# Patient Record
Sex: FEMALE | Race: WHITE | NOT HISPANIC OR LATINO | Employment: FULL TIME | URBAN - METROPOLITAN AREA
[De-identification: names, ages, dates, MRNs, and addresses within clinical notes are randomized per-mention and may not be internally consistent; named-entity substitution may affect disease eponyms.]

---

## 2023-02-24 LAB
ALANINE AMINOTRANSFERASE (SGPT) (U/L) IN SER/PLAS: 5 U/L (ref 7–45)
ALBUMIN (G/DL) IN SER/PLAS: 4.8 G/DL (ref 3.4–5)
ALKALINE PHOSPHATASE (U/L) IN SER/PLAS: 79 U/L (ref 33–110)
ANION GAP IN SER/PLAS: 13 MMOL/L (ref 10–20)
ASPARTATE AMINOTRANSFERASE (SGOT) (U/L) IN SER/PLAS: 16 U/L (ref 9–39)
BILIRUBIN TOTAL (MG/DL) IN SER/PLAS: 0.9 MG/DL (ref 0–1.2)
CALCIUM (MG/DL) IN SER/PLAS: 9.7 MG/DL (ref 8.6–10.6)
CARBON DIOXIDE, TOTAL (MMOL/L) IN SER/PLAS: 30 MMOL/L (ref 21–32)
CHLORIDE (MMOL/L) IN SER/PLAS: 102 MMOL/L (ref 98–107)
CHOLESTEROL (MG/DL) IN SER/PLAS: 214 MG/DL (ref 0–199)
CHOLESTEROL IN HDL (MG/DL) IN SER/PLAS: 64.7 MG/DL
CHOLESTEROL/HDL RATIO: 3.3
CREATININE (MG/DL) IN SER/PLAS: 0.72 MG/DL (ref 0.5–1.05)
ERYTHROCYTE DISTRIBUTION WIDTH (RATIO) BY AUTOMATED COUNT: 12.6 % (ref 11.5–14.5)
ERYTHROCYTE MEAN CORPUSCULAR HEMOGLOBIN CONCENTRATION (G/DL) BY AUTOMATED: 32.9 G/DL (ref 32–36)
ERYTHROCYTE MEAN CORPUSCULAR VOLUME (FL) BY AUTOMATED COUNT: 96 FL (ref 80–100)
ERYTHROCYTES (10*6/UL) IN BLOOD BY AUTOMATED COUNT: 4.55 X10E12/L (ref 4–5.2)
ESTIMATED AVERAGE GLUCOSE FOR HBA1C: 100 MG/DL
GFR FEMALE: >90 ML/MIN/1.73M2
GLUCOSE (MG/DL) IN SER/PLAS: 89 MG/DL (ref 74–99)
HEMATOCRIT (%) IN BLOOD BY AUTOMATED COUNT: 43.8 % (ref 36–46)
HEMOGLOBIN (G/DL) IN BLOOD: 14.4 G/DL (ref 12–16)
HEMOGLOBIN A1C/HEMOGLOBIN TOTAL IN BLOOD: 5.1 %
LDL: 133 MG/DL (ref 0–99)
LEUKOCYTES (10*3/UL) IN BLOOD BY AUTOMATED COUNT: 5.7 X10E9/L (ref 4.4–11.3)
NRBC (PER 100 WBCS) BY AUTOMATED COUNT: 0 /100 WBC (ref 0–0)
PLATELETS (10*3/UL) IN BLOOD AUTOMATED COUNT: 229 X10E9/L (ref 150–450)
POTASSIUM (MMOL/L) IN SER/PLAS: 3.5 MMOL/L (ref 3.5–5.3)
PROTEIN TOTAL: 6.8 G/DL (ref 6.4–8.2)
SODIUM (MMOL/L) IN SER/PLAS: 141 MMOL/L (ref 136–145)
THYROTROPIN (MIU/L) IN SER/PLAS BY DETECTION LIMIT <= 0.05 MIU/L: 1.87 MIU/L (ref 0.44–3.98)
TRIGLYCERIDE (MG/DL) IN SER/PLAS: 80 MG/DL (ref 0–149)
UREA NITROGEN (MG/DL) IN SER/PLAS: 14 MG/DL (ref 6–23)
VLDL: 16 MG/DL (ref 0–40)

## 2023-03-15 ENCOUNTER — TELEPHONE (OUTPATIENT)
Dept: PRIMARY CARE | Facility: CLINIC | Age: 58
End: 2023-03-15
Payer: COMMERCIAL

## 2023-03-15 DIAGNOSIS — I10 HYPERTENSION, UNSPECIFIED TYPE: Primary | ICD-10-CM

## 2023-03-15 PROBLEM — G89.29 CHRONIC HIP PAIN: Status: ACTIVE | Noted: 2023-03-15

## 2023-03-15 PROBLEM — J30.9 ALLERGIC RHINITIS: Status: ACTIVE | Noted: 2023-03-15

## 2023-03-15 PROBLEM — L71.9 ROSACEA: Status: ACTIVE | Noted: 2023-03-15

## 2023-03-15 PROBLEM — M15.9 OSTEOARTHRITIS OF MULTIPLE JOINTS: Status: ACTIVE | Noted: 2023-03-15

## 2023-03-15 PROBLEM — R92.8 ABNORMAL SCREENING MAMMOGRAM: Status: ACTIVE | Noted: 2023-03-15

## 2023-03-15 PROBLEM — N95.2 POSTMENOPAUSAL ATROPHIC VAGINITIS: Status: ACTIVE | Noted: 2023-03-15

## 2023-03-15 PROBLEM — D12.6 ADENOMATOUS COLON POLYP: Status: ACTIVE | Noted: 2023-03-15

## 2023-03-15 PROBLEM — R41.3 MEMORY IMPAIRMENT: Status: ACTIVE | Noted: 2023-03-15

## 2023-03-15 PROBLEM — N63.20 LEFT BREAST MASS: Status: ACTIVE | Noted: 2023-03-15

## 2023-03-15 PROBLEM — G25.81 RLS (RESTLESS LEGS SYNDROME): Status: ACTIVE | Noted: 2023-03-15

## 2023-03-15 PROBLEM — M72.2 PLANTAR FASCIAL FIBROMATOSIS: Status: ACTIVE | Noted: 2023-03-15

## 2023-03-15 PROBLEM — E78.5 HYPERLIPIDEMIA: Status: ACTIVE | Noted: 2023-03-15

## 2023-03-15 PROBLEM — M25.559 CHRONIC HIP PAIN: Status: ACTIVE | Noted: 2023-03-15

## 2023-03-15 PROBLEM — M65.30 TRIGGER FINGER: Status: ACTIVE | Noted: 2023-03-15

## 2023-03-15 PROBLEM — F41.9 ANXIETY DISORDER: Status: ACTIVE | Noted: 2023-03-15

## 2023-03-15 PROBLEM — M51.360 LUMBAR DISCOGENIC PAIN SYNDROME: Status: ACTIVE | Noted: 2023-03-15

## 2023-03-15 PROBLEM — R73.01 ABNORMAL FASTING GLUCOSE: Status: ACTIVE | Noted: 2023-03-15

## 2023-03-15 PROBLEM — G20.A1 PARKINSON DISEASE, SYMPTOMATIC (MULTI): Status: ACTIVE | Noted: 2023-03-15

## 2023-03-15 PROBLEM — G47.33 OSA ON CPAP: Status: ACTIVE | Noted: 2023-03-15

## 2023-03-15 PROBLEM — L30.8 PSORIASIFORM DERMATITIS: Status: ACTIVE | Noted: 2023-03-15

## 2023-03-15 PROBLEM — N95.1 HOT FLASH, MENOPAUSAL: Status: ACTIVE | Noted: 2023-03-15

## 2023-03-15 PROBLEM — R79.89 D-DIMER, ELEVATED: Status: ACTIVE | Noted: 2023-03-15

## 2023-03-15 PROBLEM — K21.9 GERD WITHOUT ESOPHAGITIS: Status: ACTIVE | Noted: 2023-03-15

## 2023-03-15 PROBLEM — M79.7 FIBROMYALGIA: Status: ACTIVE | Noted: 2023-03-15

## 2023-03-15 PROBLEM — N95.1 VAGINAL DRYNESS, MENOPAUSAL: Status: ACTIVE | Noted: 2023-03-15

## 2023-03-15 PROBLEM — H81.10 BPV (BENIGN POSITIONAL VERTIGO), UNSPECIFIED LATERALITY: Status: ACTIVE | Noted: 2023-03-15

## 2023-03-15 PROBLEM — E83.10 IRON METABOLISM DISORDER: Status: ACTIVE | Noted: 2023-03-15

## 2023-03-15 PROBLEM — M51.26 LUMBAR DISCOGENIC PAIN SYNDROME: Status: ACTIVE | Noted: 2023-03-15

## 2023-03-15 PROBLEM — N90.4 LICHEN SCLEROSUS ET ATROPHICUS OF THE VULVA: Status: ACTIVE | Noted: 2023-03-15

## 2023-03-15 PROBLEM — R41.89 COGNITIVE IMPAIRMENT: Status: ACTIVE | Noted: 2023-03-15

## 2023-03-15 RX ORDER — CARBIDOPA AND LEVODOPA 25; 100 MG/1; MG/1
1 TABLET ORAL 3 TIMES DAILY
COMMUNITY
Start: 2021-01-18 | End: 2024-03-21 | Stop reason: SDUPTHER

## 2023-03-15 RX ORDER — TRIAMCINOLONE ACETONIDE 5 MG/G
1 CREAM TOPICAL 2 TIMES DAILY PRN
COMMUNITY
Start: 2017-11-15

## 2023-03-15 RX ORDER — OMEPRAZOLE 20 MG/1
1 TABLET, DELAYED RELEASE ORAL DAILY
COMMUNITY
End: 2023-10-25 | Stop reason: ALTCHOICE

## 2023-03-15 RX ORDER — FLUTICASONE PROPIONATE 50 MCG
1 SPRAY, SUSPENSION (ML) NASAL DAILY
COMMUNITY
Start: 2021-03-01

## 2023-03-15 RX ORDER — QUINAPRIL 5 1/1
1 TABLET ORAL DAILY
COMMUNITY
End: 2023-10-13 | Stop reason: ALTCHOICE

## 2023-03-15 RX ORDER — HYDROCHLOROTHIAZIDE 25 MG/1
1 TABLET ORAL DAILY
COMMUNITY
Start: 2015-11-28 | End: 2023-12-03

## 2023-03-15 RX ORDER — CELECOXIB 200 MG/1
1 CAPSULE ORAL DAILY
COMMUNITY
Start: 2020-10-19 | End: 2023-10-25 | Stop reason: ALTCHOICE

## 2023-03-15 RX ORDER — GABAPENTIN 100 MG/1
CAPSULE ORAL
COMMUNITY
Start: 2022-01-05 | End: 2023-10-13 | Stop reason: HOSPADM

## 2023-03-15 RX ORDER — ESTRADIOL 0.1 MG/G
1 CREAM VAGINAL DAILY
COMMUNITY
Start: 2020-07-21 | End: 2024-03-22 | Stop reason: ALTCHOICE

## 2023-03-15 RX ORDER — MINERAL OIL
180 ENEMA (ML) RECTAL DAILY
COMMUNITY
Start: 2013-07-18

## 2023-03-15 RX ORDER — EZETIMIBE 10 MG/1
1 TABLET ORAL DAILY
COMMUNITY
Start: 2020-10-09 | End: 2024-02-21 | Stop reason: SDUPTHER

## 2023-03-15 RX ORDER — LISINOPRIL 10 MG/1
10 TABLET ORAL DAILY
Qty: 90 TABLET | Refills: 1 | Status: SHIPPED | OUTPATIENT
Start: 2023-03-15 | End: 2023-09-06

## 2023-03-15 RX ORDER — QUINAPRIL 10 MG/1
1 TABLET ORAL DAILY
COMMUNITY
Start: 2014-12-26 | End: 2023-10-13 | Stop reason: ALTCHOICE

## 2023-03-15 RX ORDER — TRIAMCINOLONE ACETONIDE 5 MG/G
OINTMENT TOPICAL
COMMUNITY
End: 2023-10-13 | Stop reason: ALTCHOICE

## 2023-03-15 RX ORDER — MECLIZINE HCL 25MG 25 MG/1
TABLET, CHEWABLE ORAL
COMMUNITY
Start: 2022-02-24 | End: 2023-10-25 | Stop reason: ALTCHOICE

## 2023-03-15 RX ORDER — METRONIDAZOLE 7.5 MG/G
1 LOTION TOPICAL 2 TIMES DAILY PRN
COMMUNITY

## 2023-03-15 RX ORDER — TIZANIDINE 2 MG/1
TABLET ORAL
COMMUNITY
End: 2023-10-26 | Stop reason: WASHOUT

## 2023-03-15 RX ORDER — GABAPENTIN 300 MG/1
CAPSULE ORAL
COMMUNITY
Start: 2021-04-30 | End: 2023-11-09

## 2023-03-15 RX ORDER — BETAMETHASONE VALERATE 1 MG/G
1 CREAM TOPICAL 2 TIMES DAILY PRN
COMMUNITY
Start: 2022-02-16

## 2023-08-08 ENCOUNTER — HOSPITAL ENCOUNTER (OUTPATIENT)
Dept: DATA CONVERSION | Facility: HOSPITAL | Age: 58
Discharge: HOME | End: 2023-08-08

## 2023-08-08 DIAGNOSIS — M47.817 SPONDYLOSIS WITHOUT MYELOPATHY OR RADICULOPATHY, LUMBOSACRAL REGION: ICD-10-CM

## 2023-09-06 DIAGNOSIS — I10 HYPERTENSION, UNSPECIFIED TYPE: ICD-10-CM

## 2023-09-06 RX ORDER — LISINOPRIL 10 MG/1
10 TABLET ORAL DAILY
Qty: 90 TABLET | Refills: 1 | Status: SHIPPED | OUTPATIENT
Start: 2023-09-06 | End: 2024-02-21 | Stop reason: SDUPTHER

## 2023-09-27 PROBLEM — M47.817 SPONDYLOSIS WITHOUT MYELOPATHY OR RADICULOPATHY, LUMBOSACRAL REGION: Status: ACTIVE | Noted: 2023-09-27

## 2023-09-27 RX ORDER — BETAMETHASONE DIPROPIONATE 0.5 MG/G
1 CREAM TOPICAL DAILY PRN
COMMUNITY
End: 2024-03-21 | Stop reason: WASHOUT

## 2023-09-27 RX ORDER — OXYBUTYNIN CHLORIDE 10 MG/1
1 TABLET, EXTENDED RELEASE ORAL DAILY
COMMUNITY
Start: 2023-04-26 | End: 2023-11-15 | Stop reason: SDUPTHER

## 2023-09-27 RX ORDER — DICLOFENAC SODIUM 75 MG/1
1 TABLET, DELAYED RELEASE ORAL 2 TIMES DAILY
COMMUNITY
Start: 2023-09-14 | End: 2023-10-26

## 2023-09-27 RX ORDER — DICLOFENAC 35 MG/1
1 CAPSULE ORAL 3 TIMES DAILY PRN
COMMUNITY
End: 2023-10-25 | Stop reason: ALTCHOICE

## 2023-10-04 NOTE — H&P (VIEW-ONLY)
Provider Impressions  1. Parkinson's disease  - Doing great. Continue CD-LD  TID. Refills sent.   - Educated on falls risk, PT, exercise. Encouraged to continue staying active.   - May bring in for injections for stiffness or she can discuss this with pain management   [facility]     Please follow up in 6 months or sooner as needed.      Patient Discussion/Summary  It was great to meet you today.     1. Parkinson's disease  - Continue levodopa  one tablet three times per day. Refills sent.   High calorie or high fat foods may slow the absorption of this medication, therefore it is best to take either 30 min prior to a meal or >2 hours after eating if able.   Doses should be timed 6-8 hours apart during waking hours.   Carbidopa-Levodopa side effects reviewed and include:  - may cause nausea and vomiting initially and with dose increases, can take with food if this helps.   - can cause orthostatic hypotension, please be cautious with position changes and lightheadedness  - may cause impulsive behaviors in susceptible patients, please let us know if this becomes a concern.   If having any issues, please call the office and let my staff know right away. Do not abruptly stop taking this medication.   - Continue staying active, let me know if you would like more PT.   - We can schedule injections with Dr. Metz or pain management for the stiffness in your neck and shoulders.       -Support groups may be helpful to patients and their families with Parkinson's disease. Organizations such as the Parkinson's Foundation, American Parkinson Disease Association, and National Kyle of Neurological Disorders and Stroke may also be helpful as they can provide reliable information and resources. The Parkinson's Disease Handbook is also available through the American Parkinson Disease Association, which can provide helpful information to patients and families. In the [facility], particular  "events and programs geared toward Parkinson's Disease include [employer], [employer], and the annual Parkinson's Bootcamp.   -Studies have suggested that exercise can slow the progression of disease, and it also can help patients feel better physically, mentally, and allow for social interaction. It also can help with the stiffness and bent posture that some patients with Parkinson's experience. Exercise routines should include things like dance and/or analy chi which can help with balance and flexibility. Other aerobic exercises like walking, biking, or swimming can also be helpful. It may be helpful to work with a physical therapist.   -Safety for patients with Parkinson's disease is very important. We want to prevent falls as much as possible, and this risk increases as the disease progresses. Consider changes in the home to make the bathtub or shower safer. Avoid have loose rugs around the house and try to make sure the house is well lit especially at night. Again, physical therapy may be helpful for falls prevention. Speech therapy may also be helpful to help with slurred or quiet speech, and to help avoid choking and problems swallowing. [facility] can help located Parkinson-trained speech therapists near you.   -Finally, Mediterranean style diet may help decrease the risk of dementia and Alzheimer 's disease. Some patients struggle with constipation from the Parkinson's disease or the medications. Make sure you speak with your provider if you are experiencing this, but some steps including increasing hydration or using a stool softener may be helpful.     The above information is paraphrased from \"UpToDate Patient Education: Parkinson disease treatment options- education, support, and therapy (Beyond The Basics)\"     Follow up in 6 months and I can see you in [place].      Diagnoses/Problems  Assessed    · Parkinson disease, symptomatic (332.0) (G20)    Orders  PMH: History of " Parkinson's disease    · Carbidopa-Levodopa  MG Oral Tablet; 1 pill at 7 AM, 1 pill at 1 PM, 1 pill at  6 PM by mouth    Chief Complaint    Follow-up Visit for Parkinson's DIsease      Reference Documentation    See scanned note Office Note: last dr bruno note .      History of Present Illness  With a past medical history of allergic rhinitis, anxiety, HTN, BPPV, chronic hip and back pain, fibromyalgia, GERD, HLD, CHAY on CPAP, PD, RLS who presents for follow up of PD.     Last seen by Dr. Bruno in March 2023.   At that time, PD was in good control. She was having no memory issues. She was stable on CD-LD  mg TID.     Since then, she has been doing OK. Notices times that her symptoms worsen. She was diagnosed in 2020. Her sister also has PD.     MOTOR SYMPTOMS:  Balance difficulty- yes, gets tripped easily, also when she turns and spins too fast. Notices her feet drag/shuffle.   Falls- 1 since last visit, got tripped up in cords.   Exercise/PT- Recently did PT for her back. Very active job.   Help with ADLs- Her  is doing household tasks now that he is retired but she feels she could still do alll of it. She is still working. Managing her own meds.     NON MOTOR SYMPTOMS  Orthostatic lightheadedness- yes , not every day, usually when she stands up too fast.   Cognitive- yes, attention issues, some forgetfulness. Leaves herself notes, sets alarms.   Depression/apathy- no   Anxiety-no   Insomnia/RBD- no   Fatigue- has been really tired lately but believes it is from stroke.   Sialorrhea- some during sleep   Hypophonia- no   Dysphagia- no   Constipation- no  Urinary dysfunction- had some urgency, now on oxybutynin.      Meds: cd-ld  TID  SE: Denies hallucinations, dyskinesia, impulse control, sudden sleep, edema  Time to turn on: about a half hour   Wearing off: does not   Previously tried: none     Medical, surgical, family, and social histories, medications, and allergies were reviewed in  the chart and with the patient during the visit.                  Review of Systems    Constitutional: no fever, no unexplained weight gain, no unexplained weight loss, not feeling tired, no chills and appetite is not poor.   Eyes: no blurred vision, no diplopia, no eyesight problems, no eye pain, no purulent discharge from the eyes, eyes not red, no dryness of the eyes and no itching of the eyes.   ENT: tinnitus and vertigo, but no hearing loss, no earache, no sore throat, no hoarseness, no swollen glands in the neck and no nosebleeds.   Cardiovascular: no chest pain, no shortness of breath, no palpitations, no lower extremity edema, no dyspnea at rest, no intermittent leg claudication, the heart rate was not slow and the heart rate was not fast.   Respiratory: no chronic cough, not coughing up sputum, no wheezing that is consistent with asthma, no shortness of breath during exertion, no asthma, no orthopnea and no Postural Nocturnal Dyspnea.   Gastrointestinal: no abdominal pain, no nausea, no vomiting, no diarrhea, no constipation, no bloody stools and no change in stool.   Genitourinary: no urinary frequency, no dysuria, no burning sensation during urination, no hematuria and no incontinence.   Musculoskeletal: joint stiffness and Neck Pain, but no arthralgias, no muscle weakness, no back pain, no difficulty walking, no myalgias, no joint swelling, no limb pain and no limb swelling.   Skin: no skin rashes, no change in skin color and pigmentation, no skin lesions, no skin lumps, no itching and no skin wound.   Neurological: dizziness, tingling and numbness, but no headaches, no seizures, no limb weakness and no fainting.   Psychiatric: no confusion, no memory lapses or loss, no depression, no sleep disturbances, no anxiety, not suicidal, no personality change and no emotional problems.   Endocrine: no excessive thirst, no cold intolerance, no heat intolerance, no dry skin and no increased urinary frequency.    Hematologic/Lymphatic: is not slow to heal, does not bleed easily, does not bruise easily, no thrombophlebitis and no anemia.   Allergic/Immunology: no frequent infections and no report of allergic reaction.   All other systems have been reviewed and are negative for complaint.      Active Problems  Problems    · Abnormal fasting glucose (790.29) (R73.01)   · Abnormal screening mammogram (793.80) (R92.8)   · Adenomatous colon polyp (211.3) (D12.6)   · Allergic rhinitis (477.9) (J30.9)   · Anxiety disorder (300.00) (F41.9)   · Added by Problem List Migration; 2013-4-2   · Back pain (724.5) (M54.9)   · Benign essential hypertension (401.1) (I10)   · Added by Problem List Migration; 2013-4-2   · BMI 25.0-25.9,adult (V85.21) (Z68.25)   · BPV (benign positional vertigo), unspecified laterality (386.11) (H81.10)   · Chronic hip pain (719.45,338.29) (M25.559,G89.29)   · D-dimer, elevated (790.92) (R79.89)   · Encounter for cervical Pap smear with pelvic exam (V76.2,V72.31) (Z01.419)   · Encounter for routine gynecological examination (V72.31) (Z01.419)   · Encounter for screening for malignant neoplasm of cervix (V76.2) (Z12.4)   · Fibromyalgia (729.1) (M79.7)   · GERD without esophagitis (530.81) (K21.9)   · Hyperlipidemia (272.4) (E78.5)   · Iron metabolism disorder (275.09) (E83.10)   · Left breast mass (611.72) (N63.20)   · Lichen sclerosus et atrophicus of the 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)   · Lumbar discogenic pain syndrome (722.10) (M51.26)   · Memory impairment (780.93) (R41.3)   · CHAY on CPAP (327.23) (G47.33)   · Osteoarthritis of multiple joints (715.89) (M15.9)   · Parkinson disease, symptomatic (332.0) (G20)   · Postmenopausal atrophic vaginitis (627.3) (N95.2)   · Psoriasiform 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)   · RLS (restless legs syndrome) (333.94) (G25.81)   · Rosacea (695.3) (L71.9)   · Screening for malignant neoplasm of cervix (V76.2) (Z12.4)   · Added by Problem List Migration; 2013-6-28; Moved to Suppressed Nov  25 2013 9:19PM   · Trigger finger, right (727.03) (M65.30)   · Trochanteric bursitis of left hip (726.5) (M70.62)   · Vaginal dryness, menopausal (627.2) (N95.1)   · Visit for screening mammogram (V76.12) (Z12.31)    Past Medical History  Problems    · History of Abnormal mammogram of left breast (793.80) (R92.8)   · Resolved Date: 15 Ran 2020   · History of Acute cystitis with hematuria (595.0) (N30.01)   · Resolved Date: 15 Ran 2020   · History of Acute low back pain (724.2) (M54.50)   · Resolved Date: 15 Ran 2020   · History of BMI 29.0-29.9,adult (V85.25) (Z68.29)   · Resolved Date: 24 Feb 2023   · History of BMI 31.0-31.9,adult (V85.31) (Z68.31)   · Resolved Date: 24 Feb 2021   · History of BMI 33.0-33.9,adult (V85.33) (Z68.33)   · Resolved Date: 24 Aug 2021   · History of BMI 35.0-35.9,adult (V85.35) (Z68.35)   · Resolved Date: 15 Ran 2020   · History of BMI 35.0-35.9,adult (V85.35) (Z68.35)   · Resolved Date: 24 Feb 2022   · History of BMI 36.0-36.9,adult (V85.36) (Z68.36)   · Resolved Date: 05 Dec 2022   · History of Breast asymmetry (611.89) (N64.89)   · Resolved Date: 24 Aug 2021   · History of Carpal tunnel syndrome of right wrist (354.0) (G56.01)   · Resolved Date: 15 Ran 2020   · History of Colon cancer screening (V76.51) (Z12.11)   · Resolved Date: 15 Ran 2020   · History of Elbow pain (719.42) (M25.529)   · Resolved Date: 24 Aug 2021   · History of Encounter for immunization (V03.89) (Z23)   · Resolved Date: 24 Feb 2023   · History of Encounter for screening for malignant neoplasm of cervix (V76.2) (Z12.4)   · Resolved Date: 24 Aug 2021   · History of Epicondylitis elbow, medial (726.31) (M77.00)   · Resolved Date: 24 Feb 2021   · History of Facial abscess (682.0) (L02.01)   · Resolved Date: 17 Mar 2018   · History of Hematuria, microscopic (599.72) (R31.29)   · Resolved Date: 21 Oct 2019   · History of breast lump (V13.89) (Z87.898)   · Resolved Date: 24 Aug 2021   · History of carpal tunnel surgery  (V15.89) (Z98.890)   · Resolved Date: 15 Ran 2020   · History of chest pain (V13.89) (Z87.898)   · Resolved Date: 09 Oct 2020   · History of dermatitis (V13.3) (Z87.2)   · Resolved Date: 21 Oct 2019   · History of dyspareunia (V13.29)   · Resolved Date: 21 Oct 2019   · 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)   · Resolved Date: 24 Feb 2021   · History of impaired cognition (V12.49) (Z86.69)   · Resolved Date: 24 Feb 2023   · History of joint pain (V13.59) (Z87.39)   · Resolved Date: 15 Ran 2020   · History of Parkinson's disease (V12.49) (Z86.69)   · Resolved Date: 24 Aug 2021   · History of postmenopausal bleeding (V13.29) (Z87.42)   · Resolved Date: 20 Oct 2019   · History of screening mammography (V15.89) (Z92.89)   · Resolved Date: 24 Feb 2023   · History of spinal stenosis (V13.59) (Z87.39)   · Resolved Date: 21 Oct 2019   · History of urinary frequency (V13.09) (Z87.898)   · Resolved Date: 15 Ran 2020   · History of Hot flash, menopausal (627.2) (N95.1)   · Resolved Date: 24 Feb 2023   · History of Hyperplastic colon polyp (211.3) (K63.5)   · 5/10/16   · History of Influenza vaccine administered (V04.81) (Z23)   · Resolved Date: 20 Oct 2019   · History of Leukocytes in urine (791.7) (R82.998)   · Resolved Date: 20 Oct 2019   · History of Mass of finger of right hand (782.2) (R22.31)   · Resolved Date: 15 Ran 2020   · History of Memory changes (780.93) (R41.3)   · Resolved Date: 24 Feb 2023   · History of Memory disorder (780.93) (R41.3)   · Resolved Date: 24 Aug 2021   · History of Nephrolithiasis (V13.01)   · Resolved Date: 18 Jul 2013   · History of Numbness of fingers (782.0) (R20.0)   · Resolved Date: 15 Ran 2020   · History of Other acute sinusitis, recurrence not specified (461.8) (J01.80)   · Resolved Date: 20 Oct 2019   · History of Plantar fascial fibromatosis (728.71) (M72.2)   · Resolved Date: 24 Feb 2023   · History of Screening for rectal cancer (V76.41) (Z12.12)   · Resolved  Date: 15 Ran 2020   · History of Trigger ring finger of right hand (727.03) (M65.341)   · Resolved Date: 15 Ran 2020    Surgical History  Problems    · History of Complete Colonoscopy   · 5/10/16 hyperplastic   · History of Endoscopy   · History of Lysis Of Peritoneal Adhesions Laparoscopic   · History of Tubal Ligation    Family History  Mother    · Family history of Coronary arteriosclerosis   · Family history of Diabetes Mellitus (V18.0)   · Family history of Essential Hypertension   · Family history of malignant neoplasm of uterus (V16.49) (Z80.49)   · Family history of valvular heart disease (V17.49) (Z82.49)   · Denied: Family history of Obstructive sleep apnea, adult   · Family history of Presence of stent in coronary artery in patient with coronary artery  disease  Father    · Family history of Father  At Age 65   · Family history of Lung Cancer (V16.1)   · Denied: Family history of Obstructive sleep apnea, adult  Sister    · Family history of Essential Hypertension   · Family history of Parkinson's disease (V17.2) (Z82.0)  Brother    · Family history of Coronary Artery Disease (V17.49)   · Family history of Essential Hypertension  Other    · Family history of arthritis (V17.7) (Z82.61)   · Denied: Family history of connective tissue disease    Social History  Problems    · Alcohol Use (History)   ·    · Never a smoker   · Sexually active    Allergies  Medication    · Amoxicillin Trihydrate POWD   Adverse Reaction; Rash; Recorded By: Manolo Llanes; 2019 8:27:44 AM   · Rosuvastatin Calcium TABS   Adverse Reaction; Myalgia; Updated By: Manolo Llanes; 3/16/2019 8:37:54 AM  muscle aches    Current Meds    Medication Name Instruction   Betamethasone Valerate 0.1 % External Cream APPLY SPARINGLY TO AFFECTED AREA(S) TWICE DAILY   Carbidopa-Levodopa  MG Oral Tablet 1 pill at 7 AM, 1 pill at 1 PM, 1 pill at 6 PM by mouth   Diclofenac Sodium 75 MG Oral Tablet Delayed Release Take 1 tablet  twice daily   Estradiol 0.1 MG/GM Vaginal Cream INSERT 1/4 APPLICATORFUL  (1GM) VAGINALLY TWICE  WEEKLY.   Ezetimibe 10 MG Oral Tablet TAKE 1 TABLET BY MOUTH EVERY DAY   Fexofenadine HCl - 180 MG Oral Tablet TAKE 1 TABLET DAILY AS NEEDED FOR ALLERGIES by mouth   Fluticasone Propionate 50 MCG/ACT Nasal Suspension USE 1 SPRAY IN EACH NOSTRIL ONCE DAILY.   Gabapentin 300 MG Oral Capsule Take 2 capsule in the evening by mouth   hydroCHLOROthiazide 25 MG Oral Tablet Take 1 tablet daily   Lisinopril 10 MG Oral Tablet TAKE 1 TABLET DAILY.   oxyBUTYnin Chloride ER 10 MG Oral Tablet Extended Release 24 Hour Take 1 tablet daily   Triamcinolone Acetonide 0.5 % External Cream Apply sparingly to external vulva twice weekly for itching     Vitals  Vital Signs    Recorded: 15Sep2023 01:31PM   Temperature 96.7 F   Heart Rate 67   Systolic 118, LUE, Sitting   Diastolic 76, LUE, Sitting   Blood Pressure Cuff Size Adult   Height 5 ft 3.75 in   Weight 139 lb 4 oz   BMI Calculated 24.09 kg/m2   BSA Calculated 1.67   Tobacco Use b) No   Falls Screening (Age 18+) b) One or more falls in the last year   O2 Saturation 98     Physical Exam  Alert and oriented to person, place, time, and situation.   Facial muscles are symmetric without masked facies.  Ocular versions intact.   Speech fluent to history and without hypophonia.   Muscle tone and bulk normal and without cogwheel rigidity.   Bradykindetic in bilateral upper and lower extremities, worse on the right side.  No tremors or dyskinesia.  Normal posture. shuffling gait without decreased arm swing. No on-block turning or retropulsion.      Time    Prep time on date of the patient encounter: 5 minutes.   Time spent directly with patient/family/caregiver: 25 minutes.   Documentation time: 5 minutes.   Total time on date of patient encounter: 35 minutes.      Signatures   Electronically signed by : Unique Mcdonald PA-C; Sep 15 2023  2:13PM EST (Author)

## 2023-10-13 ENCOUNTER — HOSPITAL ENCOUNTER (OUTPATIENT)
Dept: RADIOLOGY | Facility: HOSPITAL | Age: 58
Discharge: HOME | End: 2023-10-13
Payer: COMMERCIAL

## 2023-10-13 ENCOUNTER — HOSPITAL ENCOUNTER (OUTPATIENT)
Dept: GASTROENTEROLOGY | Facility: HOSPITAL | Age: 58
Discharge: HOME | End: 2023-10-13
Payer: COMMERCIAL

## 2023-10-13 VITALS
HEART RATE: 52 BPM | SYSTOLIC BLOOD PRESSURE: 109 MMHG | RESPIRATION RATE: 16 BRPM | DIASTOLIC BLOOD PRESSURE: 76 MMHG | WEIGHT: 145 LBS | TEMPERATURE: 97.7 F | OXYGEN SATURATION: 100 % | BODY MASS INDEX: 24.75 KG/M2 | HEIGHT: 64 IN

## 2023-10-13 DIAGNOSIS — M51.36 DDD (DEGENERATIVE DISC DISEASE), LUMBAR: Primary | ICD-10-CM

## 2023-10-13 PROCEDURE — 62323 NJX INTERLAMINAR LMBR/SAC: CPT | Performed by: ANESTHESIOLOGY

## 2023-10-13 PROCEDURE — 77003 FLUOROGUIDE FOR SPINE INJECT: CPT

## 2023-10-13 ASSESSMENT — COLUMBIA-SUICIDE SEVERITY RATING SCALE - C-SSRS
1. IN THE PAST MONTH, HAVE YOU WISHED YOU WERE DEAD OR WISHED YOU COULD GO TO SLEEP AND NOT WAKE UP?: NO
6. HAVE YOU EVER DONE ANYTHING, STARTED TO DO ANYTHING, OR PREPARED TO DO ANYTHING TO END YOUR LIFE?: NO
2. HAVE YOU ACTUALLY HAD ANY THOUGHTS OF KILLING YOURSELF?: NO

## 2023-10-13 ASSESSMENT — PAIN DESCRIPTION - DESCRIPTORS: DESCRIPTORS: ACHING

## 2023-10-13 ASSESSMENT — PAIN SCALES - GENERAL: PAINLEVEL_OUTOF10: 3

## 2023-10-13 ASSESSMENT — PAIN - FUNCTIONAL ASSESSMENT: PAIN_FUNCTIONAL_ASSESSMENT: 0-10

## 2023-10-13 NOTE — DISCHARGE INSTRUCTIONS
DISCHARGEINSTRUCTIONS FOR INJECTIONS     You underwent L3/4 Epidrual Steroid Injection today    Aftermost injections, it is recommended that you relax and limit your activity for the remainder of the day unless you have been told otherwise by your pain physician.  You should not drive a car, operate machinery, or make important legal decisions unless otherwise directed by your pain physician.  You may resume your normal activity, including exercise, tomorrow.      Keep a written pain diary of how much pain relief you experienced following the injection procedure and the length of time of pain relief you experienced pain relief. Following diagnostic injections like medial branch nerve blocks, sacroiliac joint blocks, stellate ganglion injections and other blocks, it is very important you record the specific amount of pain relief you experienced immediately after the injectionand how long it lasted. Your doctor will ask you for this information at your follow up visit.     For all injections, please keep the injection site dry and inspect the site for a couple of days. You may remove the Band-Aid the day of the injection at any time.     Some discomfort, bruising or slight swelling may occur at the injection site. This is not abnormal if it occurs.  If needed you may:    -Take over the counter medication such as Tylenol or Motrin.   -Apply an ice pack for 30 minutes, 2 to 3 times a day for the first 24 hours.     You may shower today; no soaking baths, hot tubs, whirlpools or swimming pools for two days.      If you are given steroids in your injection, it may take 3-5 days for the steroid medication to take effect. You may notice a worsening of your symptoms for 1-2 days after the injection. This is not abnormal.  You may use acetaminophen, ibuprofen, or prescription medication that your doctor may have prescribed for you if you need to do so.     A few common side effects of steroids include facial flushing,  sweating, restlessness, irritability,difficulty sleeping, increase in blood sugar, and increased blood pressure. If you have diabetes, please monitor your blood sugar at least once a day for at least 5 days. If you have poorly controlled high blood pressure, monitoryour blood pressure for at least 2 days and contact your primary care physician if these numbers are unusually high for you.      If you take aspirin or non-steroidal anti-inflammatory drugs (examples are Motrin, Advil, ibuprofen, Naprosyn, Voltaren, Relafen, etc.) you may restart these this evening, but stop taking it 3 days before your next appointment, unless instructed otherwiseby your physician.      You do not need to discontinue non-aspirin-containing pain medications prior to an injection (examples: Celebrex, tramadol, hydrocodone and acetaminophen).      If you take a blood thinning medication (Coumadin, Lovenox, Fragmin,Ticlid, Plavix, Pradaxa, etc.), please discuss this with your primary care physician/cardiologist and your pain physician. These medications MUST be discontinued before you can have an injection safely, without the risk of uncontrolled bleeding. If these medications are not discontinued for an appropriate period of time, you will not be able to receivean injection.      If you are taking Coumadin, please have your INR checked the morning of your procedure and bringthe result to your appointment unless otherwise instructed. If your INR is over 1.2, your injection may need to be rescheduled to avoid uncontrolled bleeding from the needle placement.     Call [facility] at [phone] between 8am-4pm Monday - Friday if you are experiencing the following:    If you received an epidural or spinal injection:    -Headache that doesnot go away with medicine, is worse when sitting or standing up, and is greatly relieved upon lying down.   -Severe pain worse than or different than your baseline pain.   -Chills or fever  (101º F or greater).   -Drainage or signs of infection at the injection site     Go directly to the Emergency Department if you are experiencing the following and received an epidural or spinal injection:   -Abrupt weakness or progressive weakness in your legs that starts after you leave the clinic.   -Abrupt severe or worsening numbness in your legs.   -Inability to urinate after the injection or loss of bowel or bladder control without the urge to defecate or urinate.     If you have a clinical question that cannot wait until your next appointment, please call [phone] between 8am-4pm Monday - Friday or send a [employer] message. We do our best to return all non-emergency messages within 24 hours, Monday - Friday. A nurse or physician will return your message.      If you need to cancel an appointment, please call the scheduling staff at [phone] during normal business hours or leave a message at least 24 hours in advance.     If you are going to be sedated for your next procedure, you MUST have responsible adult who can legally drive accompany you home. You cannot eat or drink for eight hours prior to the planned procedure if you are going to receive sedation. You may take your non-blood thinning medications with a small sip of water.

## 2023-10-13 NOTE — OP NOTE
Preprocedure diagnosis: Degenerative disc disease, lumbar disc herniation  Postprocedure diagnosis lumbar degenerative disc disease, lumbar disc herniation    Procedure performed: L3/4 interlaminar epidural steroid junction under fluoroscopic guidance.    Physician: Matty Perez MD    Anesthesia: Local,    Complications: none    Blood loss:  none    Clinical note: This is a very pleasant 58 who suffers with low back and leg here meeting all medical criteria for above-mentioned procedure.    Procedure: PROCEDURE: Intralaminar L3/4 epidural Steroid Injection    The patient was identified in the preoperative area.  The procedure was discussed in detail including its risks, benefits and alternatives.  Signed consent was obtained and the patient agreed to proceed.    The patient was brought to the [facility] procedure room and was positioned in the prone position onto the procedure room table.  A pillow was placed below the patient's abdomen.  A safety strap was placed across the legs.  The lumbar region was then exposed, prepped and draped in the usual sterile fashion using 2% Chloroprep scrub.  After that, under fluoroscopic guidance in the AP view the L3/4 intralaminar space was identified.  The intended entry point was marked onto the skin and then 10 ml of 2% preservative-free lidocaine was injected using a 25 gauge needle to anesthetize the skin and subcutaneous tissues along the intended needle trajectory.  Next, an 18 Tuohy needle was advanced under intermittent fluoroscopic guidance until bony contact was made with the lamina of L4.  The Tuohy needle was then walked off the lamina in a cephalad manner into the L3/4 intralaminar space.  Using a loss of resistance technique, the epidural space was entered with ease.  The stylette was removed from the needle and the needle was aspirated, which was negative for heme and CSF.  After that, 1 ml of Omnipaque contrast dye was injected  into the needle under live fluoroscopy which showed appropriate epidural spread without intravascular or intrathecal uptake.  Then after another negative aspirate, 40 mg of triamcinolone mixed with 4 ml of preservative-free 0.5% lidocaine was injected via the Tuohy needle.  The needle was then removed and a bandage was applied.  The patient tolerated the procedure well and was transferred back to the discharge area.  The patient was monitored for 15 minutes and vital signs were stable.  The patient was discharged home in stable condition with a .

## 2023-10-13 NOTE — INTERVAL H&P NOTE
H&P was completed on 9/14/2023 in the [facility] EMR.  No changes to the H&P today.

## 2023-10-25 NOTE — TELEPHONE ENCOUNTER
Patient stopped at the  would like to know if she can get a refill on IC Oxybutynin  CL ER 10 mg tab. Takes 1 tab po every day. Pt stated she got this med from Suzanne Wilton. Patient uses [facility] pharmacy [address], [place]

## 2023-10-26 ENCOUNTER — OFFICE VISIT (OUTPATIENT)
Dept: PAIN MEDICINE | Facility: CLINIC | Age: 58
End: 2023-10-26
Payer: COMMERCIAL

## 2023-10-26 VITALS
BODY MASS INDEX: 24.75 KG/M2 | HEART RATE: 64 BPM | WEIGHT: 145 LBS | DIASTOLIC BLOOD PRESSURE: 78 MMHG | HEIGHT: 64 IN | SYSTOLIC BLOOD PRESSURE: 135 MMHG

## 2023-10-26 DIAGNOSIS — M47.817 SPONDYLOSIS WITHOUT MYELOPATHY OR RADICULOPATHY, LUMBOSACRAL REGION: ICD-10-CM

## 2023-10-26 DIAGNOSIS — M54.14 THORACIC RADICULOPATHY: ICD-10-CM

## 2023-10-26 DIAGNOSIS — M47.24 SPONDYLOSIS OF THORACIC SPINE WITH RADICULOPATHY: Primary | ICD-10-CM

## 2023-10-26 PROCEDURE — 99214 OFFICE O/P EST MOD 30 MIN: CPT | Performed by: NURSE PRACTITIONER

## 2023-10-26 PROCEDURE — 3075F SYST BP GE 130 - 139MM HG: CPT | Performed by: NURSE PRACTITIONER

## 2023-10-26 PROCEDURE — 1036F TOBACCO NON-USER: CPT | Performed by: NURSE PRACTITIONER

## 2023-10-26 PROCEDURE — 3078F DIAST BP <80 MM HG: CPT | Performed by: NURSE PRACTITIONER

## 2023-10-26 RX ORDER — CELECOXIB 200 MG/1
200 CAPSULE ORAL DAILY
Qty: 30 CAPSULE | Refills: 1 | Status: SHIPPED | OUTPATIENT
Start: 2023-10-26 | End: 2023-12-28

## 2023-10-26 ASSESSMENT — PAIN - FUNCTIONAL ASSESSMENT: PAIN_FUNCTIONAL_ASSESSMENT: 0-10

## 2023-10-26 ASSESSMENT — PAIN SCALES - GENERAL: PAINLEVEL_OUTOF10: 3

## 2023-10-26 ASSESSMENT — PATIENT HEALTH QUESTIONNAIRE - PHQ9
SUM OF ALL RESPONSES TO PHQ9 QUESTIONS 1 AND 2: 0
2. FEELING DOWN, DEPRESSED OR HOPELESS: NOT AT ALL
1. LITTLE INTEREST OR PLEASURE IN DOING THINGS: NOT AT ALL

## 2023-10-26 ASSESSMENT — PAIN DESCRIPTION - DESCRIPTORS: DESCRIPTORS: ACHING;BURNING

## 2023-10-26 NOTE — PROGRESS NOTES
Subjective   Patient ID: Kellen Shook is a 58 y.o. female who presents for Follow-up post L3/4 LESI.    HPI 57 YO Female with a PMHx significant for Polyarthritis, Parkinson's Disease, HTN, HLD, OAB, CHAY, Spondylosis of both the thoracic and lumbosacral regions, DDD of the lumbar spine and Lumbar Disc Displacement. She presents for a follow-up s/p L3/4 LESI with Dr. Perez on 10/13/2023. Unfortunately, Kellen reports no improvement in pain since having the injection. Her pain is still at baseline. She notes severe thoracic pain that radiates around both sides. She also reports numbness into the chest and rib area. She is not even able to sit in the exam room during out visit. She reports prolonged sitting, standing and walking increases her pain. She also is having pain while lying down and is often awakening at night due to pain. Also, she reports not liking the way she feels on the Diclofenac. She reports feeling fatigued/more tired and has been having difficulty moving her bowels. She reports the pharmacy suggested she stop Celebrex because she also takes Lisinopril but she has never had an abnormal RFP.     Review of Systems Unless noted in the HPI all other systems have been reviewed and are negative for complaint    Objective   Physical Exam  General- No acute distress, well appearing and well nourished. Eyes Conjunctiva and lids: No erythema, swelling or discharge  Neck - Supple, no cervical lymphadenopathy.   Pulmonary - Respiratory effort: Normal respiration.   Cardiovascular - Normal rate and rhythm.  Examination of extremities for edema and/or varicosities: No peripheral edema  Abdomen: Soft, Non-tender, non-distended, no abdominal masses.   Musculoskeletal - Range of motion: decreased ROM to the lumbar spine.   Skin - Skin and subcutaneous tissue: Normal without rashes or lesions.  Neurologic - Reflexes: Normal. Coordination: Normal gait   Psychiatric - Orientation to person, place, and time: Normal. Mood  and affect: Normal.    Assessment/Plan   Problem List Items Addressed This Visit          Neuro    Spondylosis without myelopathy or radiculopathy, lumbosacral region    Relevant Medications    celecoxib (CeleBREX) 200 mg capsule    Thoracic radiculopathy    Relevant Medications    celecoxib (CeleBREX) 200 mg capsule    Spondylosis of thoracic spine with radiculopathy - Primary    Relevant Medications    celecoxib (CeleBREX) 200 mg capsule       TREATMENT PLAN:  I had a nice discussion with the patient today and our plan will be as follows:  Radiology: Most recent Lumbar MRI shows a disc herniation at L3-4 which does cause foraminal narrowing. There is also severe degenerative disc disease at L5 and S1 with Modic type endplate changes of L5 and S1 and a small disc herniation there as well.  Physically: Patient has completed formal PT. Encouraged patient to continue with home exercises.   Psychologically: No acute psychological needs at this time.    Medication: Currently on Diclofenac 75mg BID, PRN. She does not like the way it makes her feel and she is struggling with constipation. I reviewed her past CMPs and she has not had any abnormal kidney function panels. Her GFR >90. I think it is reasonable to stop Diclofenac and restart Celebrex as she tolerated this much better. We can be diligent with monitoring renal function.   Duration: Chronic/ongoing.    Intervention: Patient is s/p L3/4 LESI with Dr. Perez on 10/13/2023 with no relief. Patient would like to meet with Dr. Perez to further discuss the Intracept procedure.

## 2023-11-06 DIAGNOSIS — M54.51 VERTEBROGENIC LOW BACK PAIN: ICD-10-CM

## 2023-11-06 RX ORDER — DICLOFENAC SODIUM 75 MG/1
TABLET, DELAYED RELEASE ORAL
Qty: 60 TABLET | Refills: 1 | Status: SHIPPED | OUTPATIENT
Start: 2023-11-06 | End: 2023-11-16 | Stop reason: WASHOUT

## 2023-11-09 DIAGNOSIS — G25.81 RLS (RESTLESS LEGS SYNDROME): Primary | ICD-10-CM

## 2023-11-09 RX ORDER — GABAPENTIN 300 MG/1
CAPSULE ORAL
Qty: 180 CAPSULE | Refills: 3 | Status: SHIPPED | OUTPATIENT
Start: 2023-11-09 | End: 2023-12-04 | Stop reason: SDUPTHER

## 2023-11-14 ENCOUNTER — OFFICE VISIT (OUTPATIENT)
Dept: PAIN MEDICINE | Facility: CLINIC | Age: 58
End: 2023-11-14
Payer: COMMERCIAL

## 2023-11-14 VITALS
DIASTOLIC BLOOD PRESSURE: 70 MMHG | HEIGHT: 64 IN | RESPIRATION RATE: 16 BRPM | SYSTOLIC BLOOD PRESSURE: 112 MMHG | WEIGHT: 139 LBS | BODY MASS INDEX: 23.73 KG/M2

## 2023-11-14 DIAGNOSIS — M54.51 VERTEBROGENIC LOW BACK PAIN: Primary | ICD-10-CM

## 2023-11-14 DIAGNOSIS — G89.29 OTHER CHRONIC PAIN: ICD-10-CM

## 2023-11-14 DIAGNOSIS — M51.36 DDD (DEGENERATIVE DISC DISEASE), LUMBAR: ICD-10-CM

## 2023-11-14 PROBLEM — M51.26 DISC DISPLACEMENT, LUMBAR: Status: ACTIVE | Noted: 2023-11-14

## 2023-11-14 PROBLEM — M51.369 DDD (DEGENERATIVE DISC DISEASE), LUMBAR: Status: ACTIVE | Noted: 2023-11-14

## 2023-11-14 PROCEDURE — 99214 OFFICE O/P EST MOD 30 MIN: CPT | Performed by: ANESTHESIOLOGY

## 2023-11-14 PROCEDURE — 1036F TOBACCO NON-USER: CPT | Performed by: ANESTHESIOLOGY

## 2023-11-14 PROCEDURE — 3078F DIAST BP <80 MM HG: CPT | Performed by: ANESTHESIOLOGY

## 2023-11-14 PROCEDURE — 3074F SYST BP LT 130 MM HG: CPT | Performed by: ANESTHESIOLOGY

## 2023-11-14 RX ORDER — METHYLPREDNISOLONE 4 MG/1
TABLET ORAL
Qty: 21 TABLET | Refills: 0 | Status: SHIPPED | OUTPATIENT
Start: 2023-11-14 | End: 2023-11-21

## 2023-11-14 ASSESSMENT — ENCOUNTER SYMPTOMS
PSYCHIATRIC NEGATIVE: 1
ALLERGIC/IMMUNOLOGIC NEGATIVE: 1
CARDIOVASCULAR NEGATIVE: 1
BACK PAIN: 1
RESPIRATORY NEGATIVE: 1
HEMATOLOGIC/LYMPHATIC NEGATIVE: 1
CONSTITUTIONAL NEGATIVE: 1
NEUROLOGICAL NEGATIVE: 1
GASTROINTESTINAL NEGATIVE: 1
ENDOCRINE NEGATIVE: 1
EYES NEGATIVE: 1

## 2023-11-14 ASSESSMENT — LIFESTYLE VARIABLES: TOTAL SCORE: 2

## 2023-11-14 ASSESSMENT — PAIN SCALES - GENERAL
PAINLEVEL_OUTOF10: 3
PAINLEVEL: 3

## 2023-11-14 ASSESSMENT — PAIN DESCRIPTION - DESCRIPTORS: DESCRIPTORS: ACHING

## 2023-11-14 ASSESSMENT — PAIN - FUNCTIONAL ASSESSMENT: PAIN_FUNCTIONAL_ASSESSMENT: 0-10

## 2023-11-14 NOTE — PROGRESS NOTES
Subjective   Patient ID: Kellen Shook is a 58 y.o. female who presents for Back Pain.  Back Pain    Patient here today for follow up from her TRAVON.  She reports that the procedure was not helpful for.  She is still having severe pain at the bottom of her back and the tailbone area.  She has been looking into the Intracept procedure and she wants to move forward with the procedure.  She reports that her pain is elevating and her quality of life has been diminishing.  She needs to take a bigger step forward.  She has had surgical consultations and states that she is not a candidate for surgery.  Her pain is axial in nature without any radiation into the lower extremities at this time.  She reports no lower extremity weakness, numbness, tingling.  She continues to do home exercises that she learned in physical therapy.  She is concerned about maintaining her job as it becomes harder and harder.  Activities such as sitting for long time standing for long time lumbar flexion or lifting items is extremely pain provoking for her.    She is back on celebrex and it is helping her OA but not the back like the diclofenac was.      Review of Systems   Constitutional: Negative.    HENT: Negative.     Eyes: Negative.    Respiratory: Negative.     Cardiovascular: Negative.    Gastrointestinal: Negative.    Endocrine: Negative.    Genitourinary: Negative.    Musculoskeletal:  Positive for back pain.   Skin: Negative.    Allergic/Immunologic: Negative.    Neurological: Negative.    Hematological: Negative.    Psychiatric/Behavioral: Negative.         Objective   Physical Exam  Vitals and nursing note reviewed.   Constitutional:       Appearance: Normal appearance.   HENT:      Head: Normocephalic and atraumatic.      Right Ear: Ear canal and external ear normal.      Left Ear: Ear canal and external ear normal.      Nose: Nose normal.      Mouth/Throat:      Mouth: Mucous membranes are moist.      Pharynx: Oropharynx is clear.    Eyes:      Conjunctiva/sclera: Conjunctivae normal.      Pupils: Pupils are equal, round, and reactive to light.   Cardiovascular:      Rate and Rhythm: Normal rate.   Pulmonary:      Effort: Pulmonary effort is normal. No respiratory distress.   Musculoskeletal:      Cervical back: Normal range of motion and neck supple.      Lumbar back: Tenderness present. Normal range of motion.   Skin:     General: Skin is warm and dry.   Neurological:      Mental Status: She is alert.   Psychiatric:         Mood and Affect: Mood normal.         Thought Content: Thought content normal.         Assessment/Plan   Problem List Items Addressed This Visit             ICD-10-CM       Neuro    DDD (degenerative disc disease), lumbar M51.36     Other Visit Diagnoses         Codes    Vertebrogenic low back pain    -  Primary M54.51    Relevant Medications    methylPREDNISolone (Medrol Dospak) 4 mg tablets    Other chronic pain     G89.29          I nice discussion with the patient today our plan will be as follows.    Radiology: MRI imaging completed this year did show degenerative disc disease at L5-S1 with Modic type I endplate changes at L5 and S1.    Physically: Patient has completed physical therapy as well as home exercise program for greater than 6 months.    Psychologically: No issues at this time.    Medication: Continue with Celebrex or as needed anti-inflammatories.    Duration: Greater than 1 year.    Intervention: Patient had no benefit/long-term benefit with epidural steroid injections.  We will move forward with getting approval for Intracept procedure targeting L5 and S1 levels.  Risks, benefit, and alternatives of the procedure were discussed with the patient.  Oswestry score has been compelted and recorded.

## 2023-11-15 ENCOUNTER — TELEPHONE (OUTPATIENT)
Dept: PRIMARY CARE | Facility: CLINIC | Age: 58
End: 2023-11-15
Payer: COMMERCIAL

## 2023-11-15 DIAGNOSIS — N32.81 OVERACTIVE BLADDER: Primary | ICD-10-CM

## 2023-11-15 RX ORDER — OXYBUTYNIN CHLORIDE 10 MG/1
10 TABLET, EXTENDED RELEASE ORAL DAILY
Qty: 30 TABLET | Refills: 0 | Status: CANCELLED | OUTPATIENT
Start: 2023-11-15

## 2023-11-15 RX ORDER — OXYBUTYNIN CHLORIDE 10 MG/1
10 TABLET, EXTENDED RELEASE ORAL DAILY
Qty: 90 TABLET | Refills: 0 | Status: SHIPPED | OUTPATIENT
Start: 2023-11-15 | End: 2023-11-21 | Stop reason: SDUPTHER

## 2023-11-15 NOTE — TELEPHONE ENCOUNTER
Pt came by office again for refill request. Pt last seen on 5/25/2023. However, pt will make an appt. However, pt would like a small supply until her appt.

## 2023-11-21 ENCOUNTER — OFFICE VISIT (OUTPATIENT)
Dept: PRIMARY CARE | Facility: CLINIC | Age: 58
End: 2023-11-21
Payer: COMMERCIAL

## 2023-11-21 VITALS
OXYGEN SATURATION: 98 % | WEIGHT: 139 LBS | DIASTOLIC BLOOD PRESSURE: 82 MMHG | HEART RATE: 74 BPM | BODY MASS INDEX: 23.86 KG/M2 | SYSTOLIC BLOOD PRESSURE: 124 MMHG

## 2023-11-21 DIAGNOSIS — N32.81 OVERACTIVE BLADDER: Primary | ICD-10-CM

## 2023-11-21 DIAGNOSIS — M54.14 THORACIC RADICULOPATHY: ICD-10-CM

## 2023-11-21 RX ORDER — OXYBUTYNIN CHLORIDE 10 MG/1
10 TABLET, EXTENDED RELEASE ORAL DAILY
Qty: 90 TABLET | Refills: 1 | Status: SHIPPED | OUTPATIENT
Start: 2023-11-21 | End: 2024-06-03

## 2023-11-21 ASSESSMENT — COLUMBIA-SUICIDE SEVERITY RATING SCALE - C-SSRS
2. HAVE YOU ACTUALLY HAD ANY THOUGHTS OF KILLING YOURSELF?: NO
1. IN THE PAST MONTH, HAVE YOU WISHED YOU WERE DEAD OR WISHED YOU COULD GO TO SLEEP AND NOT WAKE UP?: NO
6. HAVE YOU EVER DONE ANYTHING, STARTED TO DO ANYTHING, OR PREPARED TO DO ANYTHING TO END YOUR LIFE?: NO

## 2023-11-21 ASSESSMENT — PATIENT HEALTH QUESTIONNAIRE - PHQ9
2. FEELING DOWN, DEPRESSED OR HOPELESS: NOT AT ALL
SUM OF ALL RESPONSES TO PHQ9 QUESTIONS 1 AND 2: 0
1. LITTLE INTEREST OR PLEASURE IN DOING THINGS: NOT AT ALL

## 2023-11-21 ASSESSMENT — PAIN SCALES - GENERAL: PAINLEVEL: 2

## 2023-11-21 NOTE — PROGRESS NOTES
Subjective   Patient ID: Kellen Shook is a 58 y.o. female who presents for Med Refill (Pt is here to med follow up / nr) and Back Pain (Pt has been having mid to lower back pain with numbness, which started in May / nr).    HPI  59 yo female here for medication refill and back pain  Back pain, herniated discs  Having procedure  Started with upper back numbness  Seeing pain management  2. Overactive bladder  On oxybutynin 10 mg daily  Would like refill    Review of Systems  All pertinent positive symptoms are included in the history of present illness.    All other systems have been reviewed and are negative and noncontributory to this patient's current ailments.     Allergies   Allergen Reactions    Statins-Hmg-Coa Reductase Inhibitors Hives    Amoxicillin Rash and Unknown        Immunization History   Administered Date(s) Administered    Flu vaccine (IIV4), preservative free *Check age/dose* 11/15/2017, 09/15/2018, 10/21/2019, 10/09/2020, 02/24/2022    Hepatitis B vaccine, adult (RECOMBIVAX, ENGERIX) 10/21/2019, 11/26/2019, 06/15/2020    Influenza, Unspecified 01/12/2011    Influenza, seasonal, injectable, preservative free 11/28/2015    Pfizer Purple Cap SARS-CoV-2 03/24/2021, 04/23/2021, 12/14/2021    Tdap vaccine, age 7 year and older (BOOSTRIX) 01/24/2015       Objective   Vitals:    11/21/23 1051   BP: 124/82   Pulse: 74   SpO2: 98%   Weight: 63 kg (139 lb)       Physical Exam  CONSTITUTIONAL - well nourished, well developed, looks like stated age, in no acute distress  SKIN - normal skin color and pigmentation, normal skin turgor without rash, lesions, or nodules visualized  HEAD - no trauma, normocephalic  EYES - extraocular muscles are intact  ENT - atraumatic  NECK - no neck mass was observed  LUNGS - CTA B/L  CARDIAC - regular rate and regular rhythm, no skipped beats, no murmur appreciated  ABDOMEN - no organomegaly, soft, nontender, nondistended, no guarding/rebound/rigidity  EXTREMITIES - no edema, no  deformities  MSK - moves limbs equally  NEUROLOGICAL - normal gait, normal balance, alert, oriented and no focal signs  PSYCHIATRIC - alert, pleasant and cordial, age-appropriate  IMMUNOLOGIC - no cervical lymphadenopathy     Assessment/Plan   59 yo female here for medication refill and back pain  Back pain, herniated discs  Continue seeing pain management  2. Overactive bladder  Continue oxybutynin 10 mg daily    RTC as needed

## 2023-12-03 DIAGNOSIS — I10 HYPERTENSION, UNSPECIFIED TYPE: Primary | ICD-10-CM

## 2023-12-03 RX ORDER — HYDROCHLOROTHIAZIDE 25 MG/1
25 TABLET ORAL DAILY
Qty: 90 TABLET | Refills: 3 | Status: SHIPPED | OUTPATIENT
Start: 2023-12-03

## 2023-12-04 ENCOUNTER — OFFICE VISIT (OUTPATIENT)
Dept: SLEEP MEDICINE | Facility: CLINIC | Age: 58
End: 2023-12-04
Payer: COMMERCIAL

## 2023-12-04 VITALS
HEART RATE: 54 BPM | WEIGHT: 142 LBS | BODY MASS INDEX: 24.24 KG/M2 | DIASTOLIC BLOOD PRESSURE: 82 MMHG | OXYGEN SATURATION: 98 % | SYSTOLIC BLOOD PRESSURE: 138 MMHG | TEMPERATURE: 97.8 F | HEIGHT: 64 IN

## 2023-12-04 DIAGNOSIS — G47.33 OSA ON CPAP: Primary | ICD-10-CM

## 2023-12-04 DIAGNOSIS — G25.81 RLS (RESTLESS LEGS SYNDROME): ICD-10-CM

## 2023-12-04 DIAGNOSIS — I10 BENIGN ESSENTIAL HYPERTENSION: ICD-10-CM

## 2023-12-04 PROCEDURE — 3079F DIAST BP 80-89 MM HG: CPT | Performed by: STUDENT IN AN ORGANIZED HEALTH CARE EDUCATION/TRAINING PROGRAM

## 2023-12-04 PROCEDURE — 3075F SYST BP GE 130 - 139MM HG: CPT | Performed by: STUDENT IN AN ORGANIZED HEALTH CARE EDUCATION/TRAINING PROGRAM

## 2023-12-04 PROCEDURE — 1036F TOBACCO NON-USER: CPT | Performed by: STUDENT IN AN ORGANIZED HEALTH CARE EDUCATION/TRAINING PROGRAM

## 2023-12-04 PROCEDURE — 99214 OFFICE O/P EST MOD 30 MIN: CPT | Performed by: STUDENT IN AN ORGANIZED HEALTH CARE EDUCATION/TRAINING PROGRAM

## 2023-12-04 RX ORDER — GABAPENTIN 300 MG/1
600 CAPSULE ORAL NIGHTLY
Qty: 180 CAPSULE | Refills: 3 | Status: SHIPPED | OUTPATIENT
Start: 2023-12-04 | End: 2024-12-03

## 2023-12-04 ASSESSMENT — SLEEP AND FATIGUE QUESTIONNAIRES
HOW LIKELY ARE YOU TO NOD OFF OR FALL ASLEEP WHILE SITTING QUIETLY AFTER LUNCH WITHOUT ALCOHOL: WOULD NEVER DOZE
HOW LIKELY ARE YOU TO NOD OFF OR FALL ASLEEP WHILE WATCHING TV: SLIGHT CHANCE OF DOZING
HOW LIKELY ARE YOU TO NOD OFF OR FALL ASLEEP IN A CAR, WHILE STOPPED FOR A FEW MINUTES IN TRAFFIC: WOULD NEVER DOZE
DIFFICULTY_STAYING_ASLEEP: MILD
HOW LIKELY ARE YOU TO NOD OFF OR FALL ASLEEP WHILE SITTING AND TALKING TO SOMEONE: WOULD NEVER DOZE
SLEEP_PROBLEM_INTERFERES_DAILY_ACTIVITIES: A LITTLE
SITING INACTIVE IN A PUBLIC PLACE LIKE A CLASS ROOM OR A MOVIE THEATER: WOULD NEVER DOZE
HOW LIKELY ARE YOU TO NOD OFF OR FALL ASLEEP WHILE SITTING AND READING: WOULD NEVER DOZE
DIFFICULTY_FALLING_ASLEEP: MILD
HOW LIKELY ARE YOU TO NOD OFF OR FALL ASLEEP WHILE LYING DOWN TO REST IN THE AFTERNOON WHEN CIRCUMSTANCES PERMIT: WOULD NEVER DOZE
HOW LIKELY ARE YOU TO NOD OFF OR FALL ASLEEP WHEN YOU ARE A PASSENGER IN A CAR FOR AN HOUR WITHOUT A BREAK: WOULD NEVER DOZE
SLEEP_PROBLEM_NOTICEABLE_TO_OTHERS: SOMEWHAT
WORRIED_DISTRESSED_DUE_TO_SLEEP: A LITTLE
ESS-CHAD TOTAL SCORE: 1
SATISFACTION_WITH_CURRENT_SLEEP_PATTERN: SATISFIED

## 2023-12-04 ASSESSMENT — ENCOUNTER SYMPTOMS
RESPIRATORY NEGATIVE: 1
PSYCHIATRIC NEGATIVE: 1
CARDIOVASCULAR NEGATIVE: 1
CONSTITUTIONAL NEGATIVE: 1
NEUROLOGICAL NEGATIVE: 1

## 2023-12-04 NOTE — PROGRESS NOTES
Patient: Kellen Shook    [id number]  : 1965 -- AGE 58 y.o.    Provider: Nathan Narayanan MD     Location [facility]   Service Date: 2023              [facility]  Followup Visit Note    HISTORY OF PRESENT ILLNESS     HISTORY OF PRESENT ILLNESS   Kellen Shook is a 58 y.o. female with h/o Hypertension and CHAY who presents to a [facility] for followup.     Assessment and plan from last visit: 2022    58 yo female with h/o HTN, Rhinitis, GERD, Parkinsons, recently diagnosed CHAY and sleep-related hypoventilation presented today for follow-up visit     # CHAY/hypoventilation  - severe CHAY with AHI ~ 37, and hypoventilation with Peak EtCO2 of 47 mmHg. F/u Pft was normal, further testing not warranted at this time.   - Bicarb around 30 for a while now, unlikely to be secondary to obesity as her BMI is 33.  - continue CPAP 6-12 with EPR of 2   - renewed supply order sent to [facility]     # RLS  - increase to 600 mg nightly     # HTN  - /87 today  - no headache, blurry vision, chest pain, palpitation, dizziness, syncopal episodes   - continue to f/u with PCP      she has been working on weight loss, now down 30#, and BMI is 29 from 36 previously! Great job     RTC in 1 year     Current History    On today's visit, the patient reports that she has been doing well with PAP therapy.  No issues.  She wants to get her own PAP machine now that she has her own health insurance.  She has been using her diseased father-in-law's machine for the past 2 years.  She reports that gabapentin 600 mg at bedtime has been good for her RLS    PAP Info  [employer] COMPANY: [employer]  Machine: THERAPY: RESMED AIRSENSE 10  6 cm H2O - 12 cm H2O Mask:   Issues with therapy: ISSUES WITH THERAPY: None  Benefits with PAP: PERCEIVED BENEFITS OF PAP: refreshing sleep    RLS Followup:   Current Treatment: gabapentin 600 mg qHS .  Doing well.   Continue current therapy    Daytime Symptoms    Patient reports DAYTIME SYMPTOMS: no daytime symptoms  Patient denies daytime symptoms including: Denies: excessive daytime sleepiness    Naps: No  Fatigue: denies feeling fatigue    ESS: 1  SHANELLE: 7  FOSQ: 40    REVIEW OF SYSTEMS     REVIEW OF SYSTEMS  Review of Systems   Constitutional: Negative.    HENT: Negative.     Respiratory: Negative.     Cardiovascular: Negative.    Genitourinary: Negative.    Skin: Negative.    Neurological: Negative.    Psychiatric/Behavioral: Negative.           ALLERGIES AND MEDICATIONS     ALLERGIES  Allergies   Allergen Reactions    Statins-Hmg-Coa Reductase Inhibitors Hives    Amoxicillin Rash and Unknown       MEDICATIONS: She has a current medication list which includes the following prescription(s): betamethasone dipropionate - Apply 1 Application topically once daily, betamethasone valerate - Apply topically twice a day, carbidopa-levodopa - Take by mouth, estradiol - Insert into the vagina, ezetimibe - Take 1 tablet (10 mg) by mouth once daily, fexofenadine - Take by mouth, fluticasone - Administer 1 spray into affected nostril(s) once daily, gabapentin - Take 2 capsules (600 mg) by mouth once daily at bedtime, hydrochlorothiazide - TAKE 1 TABLET BY MOUTH ONCE DAILY, lisinopril - TAKE 1 TABLET BY MOUTH EVERY DAY, metronidazole - Apply topically twice a day, multivitamin - Take 1 tablet by mouth once daily, oxybutynin xl - Take 1 tablet (10 mg) by mouth once daily, and triamcinolone - Apply sparingly to external vulva twice weekly for itching.    PAST MEDICAL HISTORY : She  has a past medical history of Acute cystitis with hematuria (01/02/2020), Anesthesia of skin (10/28/2019), Body mass index (BMI) 31.0-31.9, adult (01/12/2021), Body mass index (BMI) 33.0-33.9, adult (02/24/2021), Body mass index (BMI) 35.0-35.9, adult (03/16/2019), Body mass index (BMI) 35.0-35.9, adult (08/24/2021), Body mass index (BMI) 36.0-36.9, adult  (02/24/2022), Carpal tunnel syndrome, right upper limb (05/21/2020), Cutaneous abscess of face (12/26/2014), Encounter for immunization (06/15/2020), Encounter for screening for malignant neoplasm of cervix (07/21/2020), Encounter for screening for malignant neoplasm of colon (06/22/2019), Encounter for screening for malignant neoplasm of rectum (06/22/2019), Localized swelling, mass and lump, right upper limb (05/21/2020), Low back pain, unspecified (11/18/2019), Medial epicondylitis, unspecified elbow (10/20/2020), Other abnormal and inconclusive findings on diagnostic imaging of breast (02/18/2016), Other abnormal findings in urine, Other acute sinusitis (03/16/2019), Other amnesia (12/16/2020), Other conditions influencing health status (04/18/2013), Other conditions influencing health status (01/28/2016), Other microscopic hematuria (03/17/2018), Other specified disorders of breast (02/24/2021), Other specified postprocedural states (02/27/2020), Pain in unspecified elbow (10/20/2020), Personal history of diseases of the skin and subcutaneous tissue (11/15/2017), Personal history of other diseases of the female genital tract (06/22/2019), Personal history of other diseases of the musculoskeletal system and connective tissue (09/11/2019), Personal history of other diseases of the musculoskeletal system and connective tissue (01/27/2016), Personal history of other diseases of the nervous system and sense organs (11/20/2020), Personal history of other endocrine, nutritional and metabolic disease (11/20/2020), Personal history of other specified conditions (08/23/2021), Personal history of other specified conditions (01/02/2020), Personal history of other specified conditions (09/14/2020), Polyp of colon, Trigger finger, right ring finger (05/21/2020), and Trochanteric bursitis, left hip (04/25/2016).    PAST SURGICAL HISTORY: She  has a past surgical history that includes Other surgical history (07/18/2013); Other  "surgical history (07/21/2020); Colonoscopy (03/17/2018); and Tubal ligation (03/01/2021).     FAMILY HISTORY: No changes since previous visit. Otherwise non-contributory as charted.     SOCIAL HISTORY  She  reports that she has never smoked. She has never used smokeless tobacco. She reports current alcohol use. She reports that she does not use drugs.       PHYSICAL EXAM     VITAL SIGNS: /82   Pulse 54   Temp 36.6 °C (97.8 °F) (Temporal)   Ht 1.626 m (5' 4\")   Wt 64.4 kg (142 lb)   SpO2 98%   BMI 24.37 kg/m²      PREVIOUS WEIGHTS:  Wt Readings from Last 3 Encounters:   12/04/23 64.4 kg (142 lb)   11/21/23 63 kg (139 lb)   11/14/23 63 kg (139 lb)         RESULTS/DATA     Bicarbonate (mmol/L)   Date Value   02/24/2023 30   03/15/2021 30   03/02/2021 33 (H)     Ferritin (ug/L)   Date Value   03/02/2021 107       PAP Adherence  A PAP adherence download was obtained and data was reviewed personally today in clinic.    ASSESSMENT/PLAN     Ms. Shook is a 58 y.o. female and she returns in followup to the [facility] for CHAY.    Problem List, Orders, Assessment, Recommendations:  Problem List Items Addressed This Visit             ICD-10-CM    Benign essential hypertension I10     BP Readings from Last 1 Encounters:   12/04/23 138/82   - doing well, asymptomatic  - discussed at length the impact of untreated CHAY and BP control  - continue current management and follow-up with PCP            CHAY on CPAP - Primary G47.33     - doing well with PAP therapy  - continue current setting  - renew PAP supply orders  - she wants her own machine now, one ordered via [facility] with setting of APAP 6-12 cwp.         Relevant Orders    Positive Airway Pressure (PAP) Therapy    RLS (restless legs syndrome) G25.81     Doing well with gabapentin 600 mg nightly  Continue current Tx  Renew gabapentin         Relevant Medications    gabapentin (Neurontin) 300 mg capsule       Disposition    Return to clinic " in 3-4 months (can see Dr. Gavin or Elian Parish for ease of travel (mentor area))

## 2023-12-04 NOTE — ASSESSMENT & PLAN NOTE
- doing well with PAP therapy  - continue current setting  - renew PAP supply orders  - she wants her own machine now, one ordered via [facility] with setting of APAP 6-12 cwp.

## 2023-12-04 NOTE — ASSESSMENT & PLAN NOTE
BP Readings from Last 1 Encounters:   12/04/23 138/82     - doing well, asymptomatic  - discussed at length the impact of untreated CHAY and BP control  - continue current management and follow-up with PCP

## 2023-12-04 NOTE — PATIENT INSTRUCTIONS
"       [facility] Sleep Medicine  [address]  [facility]  [address] [facility] [address]       NAME: Kellen Shook   DATE: 12/04/23    Your Sleep Provider Today: Nathan Narayanan MD  Your Primary Care Physician: Suzanne Núñez PA-C   Your Referring Provider: No ref. provider found    DIAGNOSIS:   No diagnosis found.    Thank you for coming to the Sleep Medicine Clinic today! Your sleep medicine provider today was: Nathan Narayanan MD Below is a summary of your treatment plan, other important information, and our contact numbers:      TREATMENT PLAN     - Follow-up in 3-4 months.  - If not already done, sign up for 'My Chart' and send prescription requests or messages through this    ZACHARIAH Koch    Starting Positive Airway Pressure: You were ordered a device to wear when you sleep called PAP (Positive Airway Pressure) to treat your sleep apnea. The order will be submitted to a durable medical equipment company who will arrange setting you up with the device. They will provide all the necessary equipment and discuss use and maintenance of the device with you.     Please followup with us in 1-2 months of starting PAP to see how well it is working for you or to troubleshoot. Please bring your equipment to this initial followup visit.    **Please bring all PAP equipment with you to follow up appointments unless told otherwise.**     Important things to keep in mind as you start PAP:  Insurance will monitor your usage during the first 90 days.  You should use your PAP - \"all night, every night\", for your health. The bare minimum is to use your PAP device while sleeping = at least 4 hours per day at least 5 days per week. Otherwise, your PAP device may be reclaimed by your PAP vendor at 90 days.  There are many mask to choose from to wear with your PAP machine. If you are not comfortable with the first mask issued to you, call your DME and ask for another option to try. " Some have a 30 day return policy.  Discuss with your provider if you are having issues breathing with the machine or the temperature or humidity feel uncomfortable  Expect to have an adjustment period when you start your device. It helps to continuing wearing the machine every day for a period of time until you get more used to it. You can practice with wearing the mask alone if you need, then add in the PAP air pressure a few days later.   Reach out for help if you are struggling! The sleep medicine department can be reached at [phone]  We encourage you to download data monitoring apps to your phone. For ResMed AirSense 10/11 - weave energy augusto. For [employer] DreamStation - DreamMapper. Both are available in the [employer] store for free and are a great tool to monitor your progress with your CPAP night to night.           Instructions - Common CHAY Recs: - For your sleep apnea, continue to use your PAP every night and use it whenever you are sleeping.   - Avoid alcohol or sedatives several hours prior to sleeping.   - Get additional supplies for your PAP (e.g., mask, hose, filters) every 3 months or as your insurance allows from your [employer] company. Replacement cushions for your PAP mask can be requested monthly if airseals are an issue.  - Remember to clean your mask, tubings, and water chamber regularly as instructed.  - Avoid driving or operating heavy machinery when drowsy. A person driving while sleepy is five (5) times more likely to have an accident. If you feel sleepy, pull over and take a short power nap (sleep for less than 30 minutes). Otherwise, ask somebody to drive you.      IMPORTANT INFORMATION     Call 911 for medical emergencies.  Our offices are generally open from Monday-Friday, 9 am - 5 pm.  If you need to get in touch with me, you may either call me and my team(number is below) or you can use [employer].  If a referral for a test, for CPAP, or for another specialist was made, and you have not heard about  scheduling this within a week, please call scheduling at [phone] ([phone]).  If you are unable to make your appointment for clinic or an overnight study, kindly call the office at least 48 hours in advance to cancel and reschedule.  If you are on CPAP, please bring your device's card or the device to each clinic appointment.   There are no supporting services by either the sleep doctors or their staff on weekends and Holidays, or after 5 PM on weekdays.   If you have been asked to come to a sleep study, make sure you bring toiletries, a comfy pillow, and any nighttime medications that you may regularly take. Also be sure to eat dinner before you arrive. We generally do not provide meals.      PRESCRIPTIONS     We require 7 days advanced notice for prescription refills. If we do not receive the request in this time, we cannot guarantee that your medication will be refilled in time.      IMPORTANT PHONE NUMBERS     Sleep Medicine Clinic Fax: [phone]  Appointments (for Adult Sleep Clinic): [phone] - option 2  Appointments (For Sleep Studies): [phone] - option 3  Behavioral Sleep Medicine: [phone]  Sleep Surgery: [phone]  ENT (Otolaryngology): [phone]  Headache Clinic (Neurology): [phone]  Neurology: [phone]  Psychiatry: [phone]  Pulmonary Function Testing (PFT) Center: [phone]  Pulmonary Medicine: [phone]  [employer] (DME): [phone]  [employer] (DME): [phone]  [facility] ([facility]): [phone]      OUR ADULT SLEEP MEDICINE TEAM   Please do not hesitate to call the office or sleep nurse with any questions between appointments:    Adult Sleep Nurses (Giovanna Sutton RN and Jimena Wallace RN):  For clinical questions and refilling prescriptions: [phone]  Email sleep diaries and other documents at: [email or web address]    Adult Sleep Medicine Secretaries:  Paola Jane (For  Marie/Ryan/Nupur/Ayad/Lady/Salbador):   P: [phone]  F: [phone]  Lydia Gaviria (For Melton/Gulakshmienloraler): P: [phone]  Fax: [phone]  Briana Shore (For Jurangelvic/Blank): P: [phone]  F: [phone]  Yolette Mcclain (For Janell): P: [phone]  F: [phone]  Cassia Salinas (For Willa/Claudia/Zakhary): P: [phone]  F: [phone]  Lizette Lynch (For Artemio/Thomas): P: [phone]  F: [phone]     Adult Sleep Medicine Advanced Practice Providers:  Elian Parish ([place], [place])  Blessing Taylor ([facility])  Shazia Rollins CNP ([place], [place], [place])  Filomena Espitia CNP ([place], [place], [place])  Mckenzie Maciel ([place], [place], [place])  Sisi Guzman CNP ([place], [place])        OUR SLEEP TESTING LOCATIONS     Our team will contact you to schedule your sleep study, however, you can contact us as follow:  Main Phone Line (scheduling only): [phone]), option 3  Adult and Pediatric Locations  [facility] (6 years and older): [facility] - 4th floor ([address] [place] [address]) After hours line: [phone]  [facility] (Main campus: All ages): [facility], 6th floor. After hours line: [phone]   [place] (5 years and older; younger considered on case-by-case basis): [address]; [address], [address]. Scheduling  After hours line: [phone]   [place] (6 years and older): [address]; [address]; [address]   [place] (6 years and older): [address]  After hours line: [phone]   [place] (13 years and older) in [place]: [address], 2nd floor  After hours line: [phone]   [place] (13 year and older): [address]  After hours line: [phone]     Adult Only Locations:   [place] (18 years and older): [address] [facility], 2nd floor   [place] (18 years and older): [address]; 4th floor  After hours line: [phone]   [place] (18 years and older) at [place]: [address] [place] [address]  After hours line: [phone]          CONTACTING YOUR SLEEP MEDICINE PROVIDER     Send a message directly to your provider through \"My Chart\", which is the email service through your  Records Account: [email or web address]   Call [phone] and leave a message. One of the administrative assistants will forward the message to your sleep medicine provider through \"My Chart\" and/or email.     Your sleep medicine provider for this visit was: Nathan Narayanan MD    "

## 2024-01-04 ENCOUNTER — PRE-ADMISSION TESTING (OUTPATIENT)
Dept: PREADMISSION TESTING | Facility: HOSPITAL | Age: 59
End: 2024-01-04
Payer: COMMERCIAL

## 2024-01-04 VITALS
RESPIRATION RATE: 16 BRPM | DIASTOLIC BLOOD PRESSURE: 75 MMHG | WEIGHT: 134.92 LBS | HEIGHT: 64 IN | OXYGEN SATURATION: 99 % | BODY MASS INDEX: 23.03 KG/M2 | TEMPERATURE: 97 F | HEART RATE: 60 BPM | SYSTOLIC BLOOD PRESSURE: 136 MMHG

## 2024-01-04 DIAGNOSIS — Z01.818 PREOP TESTING: Primary | ICD-10-CM

## 2024-01-04 LAB
ANION GAP SERPL CALC-SCNC: 9 MMOL/L
APPEARANCE UR: CLEAR
ATRIAL RATE: 55 BPM
BASOPHILS # BLD AUTO: 0.03 X10*3/UL (ref 0–0.1)
BASOPHILS NFR BLD AUTO: 0.6 %
BILIRUB UR STRIP.AUTO-MCNC: NEGATIVE MG/DL
BUN SERPL-MCNC: 15 MG/DL (ref 8–25)
CALCIUM SERPL-MCNC: 9.8 MG/DL (ref 8.5–10.4)
CHLORIDE SERPL-SCNC: 99 MMOL/L (ref 97–107)
CO2 SERPL-SCNC: 29 MMOL/L (ref 24–31)
COLOR UR: YELLOW
CREAT SERPL-MCNC: 0.6 MG/DL (ref 0.4–1.6)
EOSINOPHIL # BLD AUTO: 0.08 X10*3/UL (ref 0–0.7)
EOSINOPHIL NFR BLD AUTO: 1.7 %
ERYTHROCYTE [DISTWIDTH] IN BLOOD BY AUTOMATED COUNT: 12 % (ref 11.5–14.5)
GFR SERPL CREATININE-BSD FRML MDRD: >90 ML/MIN/1.73M*2
GLUCOSE SERPL-MCNC: 89 MG/DL (ref 65–99)
GLUCOSE UR STRIP.AUTO-MCNC: NORMAL MG/DL
HCT VFR BLD AUTO: 42.7 % (ref 36–46)
HGB BLD-MCNC: 14.4 G/DL (ref 12–16)
IMM GRANULOCYTES # BLD AUTO: 0.01 X10*3/UL (ref 0–0.7)
IMM GRANULOCYTES NFR BLD AUTO: 0.2 % (ref 0–0.9)
KETONES UR STRIP.AUTO-MCNC: NEGATIVE MG/DL
LEUKOCYTE ESTERASE UR QL STRIP.AUTO: NEGATIVE
LYMPHOCYTES # BLD AUTO: 1.81 X10*3/UL (ref 1.2–4.8)
LYMPHOCYTES NFR BLD AUTO: 39 %
MCH RBC QN AUTO: 32.4 PG (ref 26–34)
MCHC RBC AUTO-ENTMCNC: 33.7 G/DL (ref 32–36)
MCV RBC AUTO: 96 FL (ref 80–100)
MONOCYTES # BLD AUTO: 0.3 X10*3/UL (ref 0.1–1)
MONOCYTES NFR BLD AUTO: 6.5 %
NEUTROPHILS # BLD AUTO: 2.41 X10*3/UL (ref 1.2–7.7)
NEUTROPHILS NFR BLD AUTO: 52 %
NITRITE UR QL STRIP.AUTO: NEGATIVE
NRBC BLD-RTO: 0 /100 WBCS (ref 0–0)
P AXIS: 68 DEGREES
P OFFSET: 209 MS
P ONSET: 147 MS
PH UR STRIP.AUTO: 6 [PH]
PLATELET # BLD AUTO: 206 X10*3/UL (ref 150–450)
POTASSIUM SERPL-SCNC: 3.8 MMOL/L (ref 3.4–5.1)
PR INTERVAL: 142 MS
PROT UR STRIP.AUTO-MCNC: NEGATIVE MG/DL
Q ONSET: 218 MS
QRS COUNT: 9 BEATS
QRS DURATION: 84 MS
QT INTERVAL: 456 MS
QTC CALCULATION(BAZETT): 436 MS
QTC FREDERICIA: 443 MS
R AXIS: 43 DEGREES
RBC # BLD AUTO: 4.45 X10*6/UL (ref 4–5.2)
RBC # UR STRIP.AUTO: NEGATIVE /UL
SODIUM SERPL-SCNC: 137 MMOL/L (ref 133–145)
SP GR UR STRIP.AUTO: 1.01
T AXIS: 58 DEGREES
T OFFSET: 446 MS
UROBILINOGEN UR STRIP.AUTO-MCNC: NORMAL MG/DL
VENTRICULAR RATE: 55 BPM
WBC # BLD AUTO: 4.6 X10*3/UL (ref 4.4–11.3)

## 2024-01-04 PROCEDURE — 36415 COLL VENOUS BLD VENIPUNCTURE: CPT

## 2024-01-04 PROCEDURE — 85025 COMPLETE CBC W/AUTO DIFF WBC: CPT

## 2024-01-04 PROCEDURE — 81003 URINALYSIS AUTO W/O SCOPE: CPT

## 2024-01-04 PROCEDURE — 93010 ELECTROCARDIOGRAM REPORT: CPT | Performed by: INTERNAL MEDICINE

## 2024-01-04 PROCEDURE — 87081 CULTURE SCREEN ONLY: CPT | Mod: TRILAB

## 2024-01-04 PROCEDURE — 93005 ELECTROCARDIOGRAM TRACING: CPT

## 2024-01-04 PROCEDURE — 99203 OFFICE O/P NEW LOW 30 MIN: CPT | Performed by: NURSE PRACTITIONER

## 2024-01-04 PROCEDURE — 80048 BASIC METABOLIC PNL TOTAL CA: CPT

## 2024-01-04 RX ORDER — CHLORHEXIDINE GLUCONATE ORAL RINSE 1.2 MG/ML
SOLUTION DENTAL
Qty: 473 ML | Refills: 0 | Status: SHIPPED | OUTPATIENT
Start: 2024-01-25 | End: 2024-01-26

## 2024-01-04 ASSESSMENT — DUKE ACTIVITY SCORE INDEX (DASI)
CAN YOU WALK A BLOCK OR TWO ON LEVEL GROUND: YES
CAN YOU RUN A SHORT DISTANCE: YES
CAN YOU CLIMB A FLIGHT OF STAIRS OR WALK UP A HILL: YES
CAN YOU TAKE CARE OF YOURSELF (EAT, DRESS, BATHE, OR USE TOILET): YES
CAN YOU WALK INDOORS, SUCH AS AROUND YOUR HOUSE: YES
CAN YOU DO MODERATE WORK AROUND THE HOUSE LIKE VACUUMING, SWEEPING FLOORS OR CARRYING GROCERIES: YES
CAN YOU DO LIGHT WORK AROUND THE HOUSE LIKE DUSTING OR WASHING DISHES: YES
DASI METS SCORE: 6.7
CAN YOU DO HEAVY WORK AROUND THE HOUSE LIKE SCRUBBING FLOORS OR LIFTING AND MOVING HEAVY FURNITURE: NO
CAN YOU DO YARD WORK LIKE RAKING LEAVES, WEEDING OR PUSHING A MOWER: NO
CAN YOU HAVE SEXUAL RELATIONS: YES
CAN YOU PARTICIPATE IN STRENOUS SPORTS LIKE SWIMMING, SINGLES TENNIS, FOOTBALL, BASKETBALL, OR SKIING: NO
TOTAL_SCORE: 32.2
CAN YOU PARTICIPATE IN MODERATE RECREATIONAL ACTIVITIES LIKE GOLF, BOWLING, DANCING, DOUBLES TENNIS OR THROWING A BASEBALL OR FOOTBALL: NO

## 2024-01-04 ASSESSMENT — ENCOUNTER SYMPTOMS
GASTROINTESTINAL NEGATIVE: 1
DIZZINESS: 1
CONSTITUTIONAL NEGATIVE: 1
PSYCHIATRIC NEGATIVE: 1
EYES NEGATIVE: 1
CARDIOVASCULAR NEGATIVE: 1
RESPIRATORY NEGATIVE: 1

## 2024-01-04 ASSESSMENT — CHADS2 SCORE
HYPERTENSION: YES
CHADS2 SCORE: 1
AGE GREATER THAN OR EQUAL TO 75: NO
PRIOR STROKE OR TIA OR THROMBOEMBOLISM: NO
AGE GREATER THAN OR EQUAL TO 75: NO
CHF: NO
DIABETES: NO

## 2024-01-04 ASSESSMENT — PAIN DESCRIPTION - DESCRIPTORS: DESCRIPTORS: ACHING

## 2024-01-04 ASSESSMENT — PAIN SCALES - GENERAL: PAINLEVEL_OUTOF10: 3

## 2024-01-04 ASSESSMENT — PAIN - FUNCTIONAL ASSESSMENT: PAIN_FUNCTIONAL_ASSESSMENT: 0-10

## 2024-01-04 NOTE — PREPROCEDURE INSTRUCTIONS
Medication List            Accurate as of January 4, 2024  8:49 AM. Always use your most recent med list.                betamethasone dipropionate 0.05 % cream     betamethasone valerate 0.1 % cream  Commonly known as: Valisone     carbidopa-levodopa  mg tablet  Commonly known as: Sinemet  Medication Adjustments for Surgery: Take morning of surgery with sip of water, no other fluids     celecoxib 200 mg capsule  Commonly known as: CeleBREX  TAKE 1 CAPSULE (200 MG) BY MOUTH ONCE DAILY  Medication Adjustments for Surgery: Stop 7 days before surgery     estradiol 0.01 % (0.1 mg/gram) vaginal cream  Commonly known as: Estrace     ezetimibe 10 mg tablet  Commonly known as: Zetia  Notes to patient: DO NOT TAKE DAY OF SURGERY     fexofenadine 180 mg tablet  Commonly known as: Allegra  Medication Adjustments for Surgery: Take morning of surgery with sip of water, no other fluids     fluticasone 50 mcg/actuation nasal spray  Commonly known as: Flonase  Medication Adjustments for Surgery: Take morning of surgery with sip of water, no other fluids     gabapentin 300 mg capsule  Commonly known as: Neurontin  Take 2 capsules (600 mg) by mouth once daily at bedtime.     hydroCHLOROthiazide 25 mg tablet  Commonly known as: HYDRODiuril  TAKE 1 TABLET BY MOUTH ONCE DAILY  Notes to patient: DO NOT TAKE MORNING OF SURGERY     lisinopril 10 mg tablet  TAKE 1 TABLET BY MOUTH EVERY DAY     metroNIDAZOLE 0.75 % lotion lotion  Commonly known as: Metrolotion     MULTIVITAMIN ORAL  Medication Adjustments for Surgery: Stop 7 days before surgery     oxybutynin XL 10 mg 24 hr tablet  Commonly known as: Ditropan-XL  Take 1 tablet (10 mg) by mouth once daily.  Notes to patient: DO NOT TAKE MORNING OF SURGERY     triamcinolone 0.5 % cream  Commonly known as: Kenalog                              NPO Instructions:    Do not eat any food after midnight the night before your surgery/procedure.    Additional Instructions:     Five Days before  Surgery:  Review your medication instructions, stop indicated medications  Begin using your Hibiclens    PAT DISCHARGE INSTRUCTIONS    Please call the Same Day Surgery (SDS) Department of the hospital where your procedure will be performed after 2:00 PM the day before your surgery. If you are scheduled on a Monday, or a Tuesday following a Monday holiday, you will need to call on the last business day prior to your surgery.    [facility]  [address], [id number] [phone]    [place] [id number] [place] [address] [phone]    [facility]  [address]  [place] [id number] [phone]    Please let your surgeon know if:      You develop any open sores, shingles, burning or painful urination as these may increase your risk of an infection.   You no longer wish to have the surgery.   Any other personal circumstances change that may lead to the need to cancel or defer this surgery-such as being sick or getting admitted to any hospital within one week of your planned procedure.    Your contact details change, such as a change of address or phone number.    Starting now:     Please DO NOT drink alcohol or smoke for 24 hours before surgery. It is well known that quitting smoking can make a huge difference to your health and recovery from surgery. The longer you abstain from smoking, the better your chances of a healthy recovery. If you need help with quitting, call 2-726-QUIT-NOW to be connected to a trained counselor who will discuss the best methods to help you quit.     Before your surgery:    Please stop all supplements 7 days prior to surgery. Or as directed by your surgeon.   Please stop taking NSAID pain medicine such as Advil and Motrin 7 days before surgery.    If you develop any fever, cough, cold, rashes, cuts, scratches, scrapes, urinary symptoms or infection  anywhere on your body (including teeth and gums) prior to surgery, please call your surgeon’s office as soon as possible. This may require treatment to reduce the chance of cancellation on the day of surgery.    The day before your surgery:   DIET- Do not eat any food after MIDNIGHT. May have 10 ounces of CLEAR LIQUIDS until TWO HOURS before your arrival time. This includes water, black tea or coffee (no milk ir cream), apple juice and electrolyte drinks (Gatorade). May chew gum until TWO hours before your surgery time.   Get a good night’s rest.  Use the special soap for bathing if you have been instructed to use one.    Scheduled surgery times may change and you will be notified if this occurs - please check your personal voicemail for any updates.     On the morning of surgery:   Wear comfortable, loose fitting clothes which open in the front. Please do not wear moisturizers, creams, lotions, makeup or perfume.    Please bring with you to surgery:   Photo ID and insurance card   Current list of medicines and allergies   Pacemaker/ Defibrillator/Heart stent cards   CPAP machine and mask    Slings/ splints/ crutches   A copy of your complete advanced directive/DHPOA.    Please do NOT bring with you to surgery:   All jewelry and valuables should be left at home.   Prosthetic devices such as contact lenses, hearing aids, dentures, eyelash extensions, hairpins and body piercings must be removed prior to going in to the surgical suite.    After outpatient surgery:   A responsible adult MUST accompany you at the time of discharge and stay with you for 24 hours after your surgery. You may NOT drive yourself home after surgery.    Do not drive, operate machinery, make critical decisions or do activities that require co-ordination or balance until after a night’s sleep.   Do not drink alcoholic beverages for 24 hours.   Instructions for resuming your medications will be provided by your surgeon.    CALL YOUR DOCTOR AFTER  SURGERY IF YOU HAVE:     Chills and/or a fever of 101° F or higher.    Redness, swelling, pus or drainage from your surgical wound or a bad smell from the wound.    Lightheadedness, fainting or confusion.    Persistent vomiting (throwing up) and are not able to eat or drink for 12 hours.    Three or more loose, watery bowel movements in 24 hours (diarrhea).   Difficulty or pain while urinating( after non-urological surgery)    Pain and swelling in your legs, especially if it is only on one side.    Difficulty breathing or are breathing faster than normal.    Any new concerning symptoms.     Home Preoperative Antibacterial Shower    What is a home preoperative antibacterial shower?  This shower is a way of cleaning the skin with a germ killing solution before surgery. The solution contains chlorhexidine, commonly known as CHG. CHG is a skin cleanser with germ killing ability. Let your doctor know if you are allergic to chlorhexidine.      Why do I need to take a preoperative antibacterial shower?  Skin is not sterile. It is best to try to make your skin as free of germs as possible before surgery. Proper cleansing with a germ killing soap before surgery can lower the number of germs on your skin. This helps to reduce the risk of infection at the surgical site. Following the instructions listed below will help you prepare your skin for surgery.    How do I use the solution?      Steps: Begin using your CHG soap FIVE DAYS BEFORE your scheduled surgery on ______JANUARY 26, 2024 START USING SOAP ON JANUARY 22, 2024____________________________________________.  First, wash and rinse your hair using the CHG soap. Keep CHG away soap away from ear canals and eyes.  Rinse completely, do not condition. Hair extensions should be removed.  Wash your face with your normal soap and rinse.  Apply the CHG solution to a clean wet washcloth. Turn the water off or move away from the water spray to avoid premature rinsing of the CHG  soap as you are applying.  Firmly lather your entire body from neck down. Do not use on face.  Pay special attention to the areas(s) where your incision(s) will be located unless they are on your face.  Avoid scrubbing your skin too hard.  The important point is to have the CHG soap sit on your skin for 3 minutes.  DO NOT wash with regular soap after you have used the CHG soap solution.  Pat yourself dry with a clean, freshly laundered towel.  DO NOT apply powders, deodorants or lotions.  Dress in clean, freshly laundered night clothes.  Be sure to sleep with clean, freshly laundered sheets.  Be aware that CHG will cause stains on fabrics; if you wash them with bleach after use. Rinse your washcloth and other linens that have contact with CHG completely. Use only non-chlorine detergents to launder the items used.  The morning of surgery is the fifth day. Repeat the above steps and dress in clean comfortable clothing.      Who should I call if I have any questions regarding the use of CHG soap?  Call the [facility] Center for Perioperative Medicine at [phone] if you have any questions.     Patient Information: Oral/Dental Rinse    What is oral/dental rinse?  It is a mouthwash. It is a way of cleaning the mouth with a germ killing solution before your surgery. The solution contains chlorhexidine, commonly know as CHG.  It is used inside the mouth to kill bacteria known as Staphylococcus aureus.  Let your doctor know if you are allergic to chlorhexidine.    Why do I need to use CHG oral/dental rinse?  The CHG oral/dental rinse helps to kill bacteria in your mouth known as Staphylococcus aureus. This reduces the risk of infection at the surgical site.    Using your CHG oral/dental rinse.  STEPS: use your CHG oral/dental rinse after you brush your teeth the night before (at bedtime) and the morning of your surgery. Follow the directions on your prescription label.  Use the cap on  the container to measure 15ml (fill cap to fill line)  Swish (gargle if you can) the mouthwash in your mouth for at least 30 seconds, (do not swallow) spit out.  After you use your CHG rinse, do not rinse your mouth with water, drink or eat. Please refer to prescription label for the appropriate time to resume oral intake.    What side effects might I have using the CHG oral/dental rinse?  CHG rinse will stick to the plaque on the teeth. Brush and floss just before use. Teeth brushing will help avoid staining of plaque during use.    Who should I contact if I have questions about the CHG oral/dental rinse?  Please call [facility], Center for Perioperative Medicine at [phone] if you have any questions.

## 2024-01-04 NOTE — CPM/PAT H&P
CPM/PAT Evaluation       Name: Kellen Shook (Kellen Shook)  /Age: 1965/58 y.o.     In-Person       Chief Complaint: lower back pain    HPI    Pt is a 58 year old female with lower back pain. Pt reports that she has had lower back pain that has gradually worsened over the years. Pt stated she works as a  with [employer] at by the end of her work day her back is painful. Pt describes the pain as a non-radiating aching pain. Pt denies weakness in her bilateral legs.  Pt denies loss of bowel or bladder function. Pt has tried back injections with no improvement from the pain. Pt has tried OTC pain medications with no improvement from the pain. Pt was examined by her physician  and has been scheduled for INTRACEPT procedure.    Past Medical History:   Diagnosis Date    Acute cystitis with hematuria 2020    Acute cystitis with hematuria    Allergic rhinitis     Anesthesia of skin 10/28/2019    Numbness of fingers    Arthritis     Body mass index (BMI) 31.0-31.9, adult 2021    BMI 31.0-31.9,adult    Body mass index (BMI) 33.0-33.9, adult 2021    BMI 33.0-33.9,adult    Body mass index (BMI) 35.0-35.9, adult 2019    BMI 35.0-35.9,adult    Body mass index (BMI) 35.0-35.9, adult 2021    BMI 35.0-35.9,adult    Body mass index (BMI) 36.0-36.9, adult 2022    BMI 36.0-36.9,adult    Carpal tunnel syndrome, right upper limb 2020    Carpal tunnel syndrome of right wrist    Chronic pain disorder     Arthritis    Cognitive disorder     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    CPAP (continuous positive airway pressure) dependence     Cutaneous abscess of face 2014    Facial abscess    Diverticulosis     Seen with first colonoscopy    Dizziness     Occasionally    Encounter for immunization 06/15/2020    Influenza vaccine administered    Encounter for screening for malignant neoplasm of cervix 2020    Encounter for screening for malignant  neoplasm of cervix    Encounter for screening for malignant neoplasm of colon 06/22/2019    Colon cancer screening    Encounter for screening for malignant neoplasm of rectum 06/22/2019    Screening for rectal cancer    Fibroid     Fibromyalgia, primary     Hyperlipidemia     Hypertension     Joint pain     Hip and back    Localized swelling, mass and lump, right upper limb 05/21/2020    Mass of finger of right hand    Low back pain, unspecified 11/18/2019    Acute low back pain    Lumbar disc disease     Degenerative  disc disease    Medial epicondylitis, unspecified elbow 10/20/2020    Epicondylitis elbow, medial    Neuromuscular disorder (CMS/HCC)     Parkinsons    Obesity     Osteoarthritis     Other abnormal and inconclusive findings on diagnostic imaging of breast 02/18/2016    Abnormal mammogram of left breast    Other abnormal findings in urine     Leukocytes in urine    Other acute sinusitis 03/16/2019    Other acute sinusitis, recurrence not specified    Other amnesia 12/16/2020    Memory disorder    Other conditions influencing health status 04/18/2013    Nephrolithiasis    Other conditions influencing health status 01/28/2016    History of dyspareunia    Other microscopic hematuria 03/17/2018    Hematuria, microscopic    Other specified disorders of breast 02/24/2021    Breast asymmetry    Other specified postprocedural states 02/27/2020    History of carpal tunnel surgery    Pain in unspecified elbow 10/20/2020    Elbow pain    Personal history of diseases of the skin and subcutaneous tissue 11/15/2017    History of dermatitis    Personal history of other diseases of the female genital tract 06/22/2019    History of postmenopausal bleeding    Personal history of other diseases of the musculoskeletal system and connective tissue 09/11/2019    History of spinal stenosis    Personal history of other diseases of the musculoskeletal system and connective tissue 01/27/2016    History of joint pain    Personal  history of other diseases of the nervous system and sense organs 11/20/2020    History of Parkinson's disease    Personal history of other endocrine, nutritional and metabolic disease 11/20/2020    History of familial combined hyperlipidemia    Personal history of other specified conditions 08/23/2021    History of breast lump    Personal history of other specified conditions 01/02/2020    History of urinary frequency    Personal history of other specified conditions 09/14/2020    History of chest pain    Polyp of colon     Hyperplastic colon polyp    Restless leg     Skin disorder     Eczema    Sleep apnea, obstructive     Snoring     Trigger finger, right ring finger 05/21/2020    Trigger ring finger of right hand    Trochanteric bursitis, left hip 04/25/2016    Trochanteric bursitis of left hip    Vertigo 2023    Happens occasionally       Past Surgical History:   Procedure Laterality Date    CARPAL TUNNEL RELEASE  02/17/2020    COLONOSCOPY  03/17/2018    Complete Colonoscopy    EPIDURAL BLOCK INJECTION      EXPLORATORY LAPAROTOMY  11/1/2002    For adhedsions from  tubal ligation    OTHER SURGICAL HISTORY  07/18/2013    Lysis Of Peritoneal Adhesions Laparoscopic    OTHER SURGICAL HISTORY  07/21/2020    Endoscopy    TRIGGER FINGER RELEASE  2/17/2020    TUBAL LIGATION  03/01/2021    Tubal Ligation       Social History     Tobacco Use    Smoking status: Never    Smokeless tobacco: Never   Substance Use Topics    Alcohol use: Yes     Comment: On occasion,  not weekly     Social History     Substance and Sexual Activity   Drug Use Never     Family History   Problem Relation Name Age of Onset    Diabetes type II Mother Angelika Ragland     Hypertension Mother Angelika Obie     Cancer Mother Angelika Ragland     Hearing loss Mother Angelika Ragland     Hyperlipidemia Mother Angelika Ragland     Heart attack Mother Angelika Ragland     Arthritis Mother Angelika Ragland     Alcohol abuse Father Marty Obie     Cancer Father Marty Obie     Arthritis  Sister Marietta Preciado     Hypertension Sister Madisyn Rojas     No Known Problems Sister      Hypertension Brother Elia Ragland     Heart attack Brother Elia Ragland     No Known Problems Brother      No Known Problems Brother      No Known Problems Brother      No Known Problems Son      No Known Problems Son      No Known Problems Son         Allergies   Allergen Reactions    Statins-Hmg-Coa Reductase Inhibitors Hives    Amoxicillin Rash and Unknown     Current Outpatient Medications   Medication Sig Dispense Refill    betamethasone dipropionate 0.05 % cream Apply 1 Application topically once daily as needed for rash.      betamethasone valerate (Valisone) 0.1 % cream Apply 1 Application topically 2 times a day as needed for rash (ECZEMA BOTH EARS).      carbidopa-levodopa (Sinemet)  mg tablet Take 1 tablet by mouth 3 times a day. 7AM, 1PM, 6PM      celecoxib (CeleBREX) 200 mg capsule TAKE 1 CAPSULE (200 MG) BY MOUTH ONCE DAILY (Patient taking differently: Take 1 capsule (200 mg) by mouth 2 times a day.) 30 capsule 0    estradiol (Estrace) 0.01 % (0.1 mg/gram) vaginal cream Insert 1 Applicatorful into the vagina once daily.      ezetimibe (Zetia) 10 mg tablet Take 1 tablet (10 mg) by mouth once daily. MORNING      fexofenadine (Allegra) 180 mg tablet Take 1 tablet (180 mg) by mouth once daily.      fluticasone (Flonase) 50 mcg/actuation nasal spray Administer 1 spray into affected nostril(s) once daily.      gabapentin (Neurontin) 300 mg capsule Take 2 capsules (600 mg) by mouth once daily at bedtime. 180 capsule 3    hydroCHLOROthiazide (HYDRODiuril) 25 mg tablet TAKE 1 TABLET BY MOUTH ONCE DAILY 90 tablet 3    lisinopril 10 mg tablet TAKE 1 TABLET BY MOUTH EVERY DAY 90 tablet 1    metroNIDAZOLE (Metrolotion) 0.75 % lotion lotion Apply 1 Application topically 2 times a day as needed (ROSECEA).      MULTIVITAMIN ORAL Take 1 tablet by mouth once daily.      oxybutynin XL (Ditropan-XL) 10 mg 24 hr tablet  "Take 1 tablet (10 mg) by mouth once daily. 90 tablet 1    triamcinolone (Kenalog) 0.5 % cream 1 Application 2 times a day as needed for rash.       No current facility-administered medications for this visit.     Review of Systems   Constitutional: Negative.    HENT: Negative.     Eyes: Negative.    Respiratory: Negative.     Cardiovascular: Negative.    Gastrointestinal: Negative.    Genitourinary: Negative.    Musculoskeletal:         See HPI   Skin: Negative.    Neurological:  Positive for dizziness (occasional dizziness with rapid changes in position).   Psychiatric/Behavioral: Negative.       /75   Pulse 60   Temp 36.1 °C (97 °F) (Temporal)   Resp 16   Ht 1.626 m (5' 4\")   Wt 61.2 kg (134 lb 14.7 oz)   SpO2 99%   BMI 23.16 kg/m²     Physical Exam  Vitals reviewed.   Constitutional:       Appearance: Normal appearance. She is normal weight.   HENT:      Head: Normocephalic and atraumatic.      Nose: Nose normal.      Mouth/Throat:      Mouth: Mucous membranes are moist.      Pharynx: Oropharynx is clear.   Eyes:      Extraocular Movements: Extraocular movements intact.      Pupils: Pupils are equal, round, and reactive to light.   Cardiovascular:      Rate and Rhythm: Normal rate and regular rhythm.      Pulses: Normal pulses.      Heart sounds: Normal heart sounds.   Pulmonary:      Effort: Pulmonary effort is normal.      Breath sounds: Normal breath sounds.   Abdominal:      General: Bowel sounds are normal.      Palpations: Abdomen is soft.   Musculoskeletal:      Cervical back: Normal range of motion.      Comments: Deferred examination of lower back to physician   Skin:     General: Skin is dry.   Neurological:      General: No focal deficit present.      Mental Status: She is alert and oriented to person, place, and time.   Psychiatric:         Mood and Affect: Mood normal.         Behavior: Behavior normal.         Thought Content: Thought content normal.         Judgment: Judgment normal. "          PAT AIRWAY:   Airway:     Mallampati::  II    TM distance::  >3 FB    Neck ROM::  Full  normal      ASA: II  DASI: 32.2  METS: 6.7  CHADS: 2.8%  RCRI: 0.4%  STOP BANG:     Assessment and Plan:     Verteebrogenic low back pain: INTRACEPT L5/S1  CHAY: compliant with CPAP Use  3.  HTN: pt is taking hydrochlorothiazide  4. Parkinson's Disease: pt is taking carbidopa-levodopa    Renee Garcia, APRN-CNP

## 2024-01-06 LAB — STAPHYLOCOCCUS SPEC CULT: NORMAL

## 2024-01-26 ENCOUNTER — ANESTHESIA EVENT (OUTPATIENT)
Dept: OPERATING ROOM | Facility: HOSPITAL | Age: 59
End: 2024-01-26
Payer: COMMERCIAL

## 2024-01-26 ENCOUNTER — APPOINTMENT (OUTPATIENT)
Dept: RADIOLOGY | Facility: HOSPITAL | Age: 59
End: 2024-01-26
Payer: COMMERCIAL

## 2024-01-26 ENCOUNTER — HOSPITAL ENCOUNTER (OUTPATIENT)
Facility: HOSPITAL | Age: 59
Setting detail: OUTPATIENT SURGERY
Discharge: HOME | End: 2024-01-26
Attending: ANESTHESIOLOGY | Admitting: ANESTHESIOLOGY
Payer: COMMERCIAL

## 2024-01-26 ENCOUNTER — PHARMACY VISIT (OUTPATIENT)
Dept: PHARMACY | Facility: CLINIC | Age: 59
End: 2024-01-26
Payer: COMMERCIAL

## 2024-01-26 ENCOUNTER — ANESTHESIA (OUTPATIENT)
Dept: OPERATING ROOM | Facility: HOSPITAL | Age: 59
End: 2024-01-26
Payer: COMMERCIAL

## 2024-01-26 VITALS
RESPIRATION RATE: 16 BRPM | OXYGEN SATURATION: 100 % | DIASTOLIC BLOOD PRESSURE: 69 MMHG | TEMPERATURE: 97.2 F | BODY MASS INDEX: 22.31 KG/M2 | SYSTOLIC BLOOD PRESSURE: 105 MMHG | WEIGHT: 130 LBS | HEART RATE: 55 BPM

## 2024-01-26 DIAGNOSIS — M54.51 VERTEBROGENIC LOW BACK PAIN: Primary | ICD-10-CM

## 2024-01-26 DIAGNOSIS — M51.36 DDD (DEGENERATIVE DISC DISEASE), LUMBAR: ICD-10-CM

## 2024-01-26 DIAGNOSIS — M51.26 DISC DISPLACEMENT, LUMBAR: ICD-10-CM

## 2024-01-26 PROCEDURE — 7100000009 HC PHASE TWO TIME - INITIAL BASE CHARGE: Performed by: ANESTHESIOLOGY

## 2024-01-26 PROCEDURE — 7100000010 HC PHASE TWO TIME - EACH INCREMENTAL 1 MINUTE: Performed by: ANESTHESIOLOGY

## 2024-01-26 PROCEDURE — RXMED WILLOW AMBULATORY MEDICATION CHARGE

## 2024-01-26 PROCEDURE — 7100000001 HC RECOVERY ROOM TIME - INITIAL BASE CHARGE: Performed by: ANESTHESIOLOGY

## 2024-01-26 PROCEDURE — 2720000007 HC OR 272 NO HCPCS: Performed by: ANESTHESIOLOGY

## 2024-01-26 PROCEDURE — 3700000002 HC GENERAL ANESTHESIA TIME - EACH INCREMENTAL 1 MINUTE: Performed by: ANESTHESIOLOGY

## 2024-01-26 PROCEDURE — 2500000004 HC RX 250 GENERAL PHARMACY W/ HCPCS (ALT 636 FOR OP/ED): Performed by: ANESTHESIOLOGY

## 2024-01-26 PROCEDURE — 3700000001 HC GENERAL ANESTHESIA TIME - INITIAL BASE CHARGE: Performed by: ANESTHESIOLOGY

## 2024-01-26 PROCEDURE — 64628 TRML DSTRJ IOS BVN 1ST 2 L/S: CPT | Performed by: ANESTHESIOLOGY

## 2024-01-26 PROCEDURE — 2500000004 HC RX 250 GENERAL PHARMACY W/ HCPCS (ALT 636 FOR OP/ED): Performed by: NURSE ANESTHETIST, CERTIFIED REGISTERED

## 2024-01-26 PROCEDURE — 3600000006 HC OR TIME - EACH INCREMENTAL 1 MINUTE - PROCEDURE LEVEL ONE: Performed by: ANESTHESIOLOGY

## 2024-01-26 PROCEDURE — 7100000002 HC RECOVERY ROOM TIME - EACH INCREMENTAL 1 MINUTE: Performed by: ANESTHESIOLOGY

## 2024-01-26 PROCEDURE — 3600000001 HC OR TIME - INITIAL BASE CHARGE - PROCEDURE LEVEL ONE: Performed by: ANESTHESIOLOGY

## 2024-01-26 PROCEDURE — 2500000005 HC RX 250 GENERAL PHARMACY W/O HCPCS: Performed by: NURSE ANESTHETIST, CERTIFIED REGISTERED

## 2024-01-26 PROCEDURE — 2500000005 HC RX 250 GENERAL PHARMACY W/O HCPCS: Performed by: ANESTHESIOLOGY

## 2024-01-26 PROCEDURE — A64628 PR THERMAL DSTRJ INTRAOSSEOUS BVN 1ST 2 LMBR/SAC: Performed by: NURSE ANESTHETIST, CERTIFIED REGISTERED

## 2024-01-26 RX ORDER — ONDANSETRON HYDROCHLORIDE 2 MG/ML
4 INJECTION, SOLUTION INTRAVENOUS ONCE AS NEEDED
Status: DISCONTINUED | OUTPATIENT
Start: 2024-01-26 | End: 2024-01-26 | Stop reason: HOSPADM

## 2024-01-26 RX ORDER — ONDANSETRON HYDROCHLORIDE 2 MG/ML
INJECTION, SOLUTION INTRAVENOUS AS NEEDED
Status: DISCONTINUED | OUTPATIENT
Start: 2024-01-26 | End: 2024-01-26

## 2024-01-26 RX ORDER — ALBUTEROL SULFATE 0.83 MG/ML
2.5 SOLUTION RESPIRATORY (INHALATION) ONCE
Status: DISCONTINUED | OUTPATIENT
Start: 2024-01-26 | End: 2024-01-26 | Stop reason: HOSPADM

## 2024-01-26 RX ORDER — FENTANYL CITRATE 50 UG/ML
50 INJECTION, SOLUTION INTRAMUSCULAR; INTRAVENOUS EVERY 5 MIN PRN
Status: DISCONTINUED | OUTPATIENT
Start: 2024-01-26 | End: 2024-01-26 | Stop reason: HOSPADM

## 2024-01-26 RX ORDER — FENTANYL CITRATE 50 UG/ML
INJECTION, SOLUTION INTRAMUSCULAR; INTRAVENOUS AS NEEDED
Status: DISCONTINUED | OUTPATIENT
Start: 2024-01-26 | End: 2024-01-26

## 2024-01-26 RX ORDER — PROPOFOL 10 MG/ML
INJECTION, EMULSION INTRAVENOUS AS NEEDED
Status: DISCONTINUED | OUTPATIENT
Start: 2024-01-26 | End: 2024-01-26

## 2024-01-26 RX ORDER — KETAMINE HYDROCHLORIDE 10 MG/ML
INJECTION, SOLUTION INTRAMUSCULAR; INTRAVENOUS AS NEEDED
Status: DISCONTINUED | OUTPATIENT
Start: 2024-01-26 | End: 2024-01-26

## 2024-01-26 RX ORDER — MIDAZOLAM HYDROCHLORIDE 1 MG/ML
INJECTION, SOLUTION INTRAMUSCULAR; INTRAVENOUS AS NEEDED
Status: DISCONTINUED | OUTPATIENT
Start: 2024-01-26 | End: 2024-01-26

## 2024-01-26 RX ORDER — MEPERIDINE HYDROCHLORIDE 25 MG/ML
12.5 INJECTION INTRAMUSCULAR; INTRAVENOUS; SUBCUTANEOUS EVERY 10 MIN PRN
Status: DISCONTINUED | OUTPATIENT
Start: 2024-01-26 | End: 2024-01-26 | Stop reason: HOSPADM

## 2024-01-26 RX ORDER — CEFAZOLIN 1 G/1
INJECTION, POWDER, FOR SOLUTION INTRAVENOUS AS NEEDED
Status: DISCONTINUED | OUTPATIENT
Start: 2024-01-26 | End: 2024-01-26

## 2024-01-26 RX ORDER — LIDOCAINE HYDROCHLORIDE 10 MG/ML
INJECTION INFILTRATION; PERINEURAL AS NEEDED
Status: DISCONTINUED | OUTPATIENT
Start: 2024-01-26 | End: 2024-01-26

## 2024-01-26 RX ORDER — SODIUM CHLORIDE, SODIUM LACTATE, POTASSIUM CHLORIDE, CALCIUM CHLORIDE 600; 310; 30; 20 MG/100ML; MG/100ML; MG/100ML; MG/100ML
INJECTION, SOLUTION INTRAVENOUS CONTINUOUS PRN
Status: DISCONTINUED | OUTPATIENT
Start: 2024-01-26 | End: 2024-01-26

## 2024-01-26 RX ORDER — TRAMADOL HYDROCHLORIDE 50 MG/1
50 TABLET ORAL EVERY 6 HOURS PRN
Qty: 20 TABLET | Refills: 0 | Status: SHIPPED | OUTPATIENT
Start: 2024-01-26 | End: 2024-01-31

## 2024-01-26 RX ORDER — MIDAZOLAM HYDROCHLORIDE 1 MG/ML
1 INJECTION, SOLUTION INTRAMUSCULAR; INTRAVENOUS ONCE AS NEEDED
Status: DISCONTINUED | OUTPATIENT
Start: 2024-01-26 | End: 2024-01-26 | Stop reason: HOSPADM

## 2024-01-26 RX ORDER — PROPOFOL 10 MG/ML
INJECTION, EMULSION INTRAVENOUS CONTINUOUS PRN
Status: DISCONTINUED | OUTPATIENT
Start: 2024-01-26 | End: 2024-01-26

## 2024-01-26 RX ORDER — BUPIVACAINE HYDROCHLORIDE 5 MG/ML
INJECTION, SOLUTION PERINEURAL AS NEEDED
Status: DISCONTINUED | OUTPATIENT
Start: 2024-01-26 | End: 2024-01-26 | Stop reason: HOSPADM

## 2024-01-26 RX ORDER — LIDOCAINE HYDROCHLORIDE 10 MG/ML
0.1 INJECTION INFILTRATION; PERINEURAL ONCE
Status: DISCONTINUED | OUTPATIENT
Start: 2024-01-26 | End: 2024-01-26 | Stop reason: HOSPADM

## 2024-01-26 RX ORDER — SODIUM CHLORIDE, SODIUM LACTATE, POTASSIUM CHLORIDE, CALCIUM CHLORIDE 600; 310; 30; 20 MG/100ML; MG/100ML; MG/100ML; MG/100ML
100 INJECTION, SOLUTION INTRAVENOUS CONTINUOUS
Status: DISCONTINUED | OUTPATIENT
Start: 2024-01-26 | End: 2024-01-26 | Stop reason: HOSPADM

## 2024-01-26 RX ADMIN — KETAMINE HYDROCHLORIDE 10 MG: 10 INJECTION, SOLUTION INTRAMUSCULAR; INTRAVENOUS at 09:05

## 2024-01-26 RX ADMIN — SODIUM CHLORIDE, POTASSIUM CHLORIDE, SODIUM LACTATE AND CALCIUM CHLORIDE: 600; 310; 30; 20 INJECTION, SOLUTION INTRAVENOUS at 08:03

## 2024-01-26 RX ADMIN — SODIUM CHLORIDE, POTASSIUM CHLORIDE, SODIUM LACTATE AND CALCIUM CHLORIDE: 600; 310; 30; 20 INJECTION, SOLUTION INTRAVENOUS at 09:06

## 2024-01-26 RX ADMIN — LIDOCAINE HYDROCHLORIDE 5 ML: 10 INJECTION, SOLUTION INFILTRATION; PERINEURAL at 08:10

## 2024-01-26 RX ADMIN — MEPERIDINE HYDROCHLORIDE 12.5 MG: 25 INJECTION INTRAMUSCULAR; INTRAVENOUS; SUBCUTANEOUS at 10:04

## 2024-01-26 RX ADMIN — METHYLPREDNISOLONE SODIUM SUCCINATE 125 MG: 125 INJECTION, POWDER, FOR SOLUTION INTRAMUSCULAR; INTRAVENOUS at 10:07

## 2024-01-26 RX ADMIN — HYDROMORPHONE HYDROCHLORIDE 0.2 MG: 0.2 INJECTION, SOLUTION INTRAMUSCULAR; INTRAVENOUS; SUBCUTANEOUS at 10:08

## 2024-01-26 RX ADMIN — FENTANYL CITRATE 50 MCG: 0.05 INJECTION, SOLUTION INTRAMUSCULAR; INTRAVENOUS at 09:38

## 2024-01-26 RX ADMIN — PROPOFOL 40 MG: 10 INJECTION, EMULSION INTRAVENOUS at 08:10

## 2024-01-26 RX ADMIN — KETAMINE HYDROCHLORIDE 10 MG: 10 INJECTION, SOLUTION INTRAMUSCULAR; INTRAVENOUS at 08:12

## 2024-01-26 RX ADMIN — KETAMINE HYDROCHLORIDE 5 MG: 10 INJECTION, SOLUTION INTRAMUSCULAR; INTRAVENOUS at 08:17

## 2024-01-26 RX ADMIN — ONDANSETRON HYDROCHLORIDE 4 MG: 2 INJECTION INTRAMUSCULAR; INTRAVENOUS at 08:13

## 2024-01-26 RX ADMIN — MEPERIDINE HYDROCHLORIDE 12.5 MG: 25 INJECTION INTRAMUSCULAR; INTRAVENOUS; SUBCUTANEOUS at 10:35

## 2024-01-26 RX ADMIN — KETAMINE HYDROCHLORIDE 15 MG: 10 INJECTION, SOLUTION INTRAMUSCULAR; INTRAVENOUS at 08:10

## 2024-01-26 RX ADMIN — PROPOFOL 100 MCG/KG/MIN: 10 INJECTION, EMULSION INTRAVENOUS at 08:10

## 2024-01-26 RX ADMIN — CEFAZOLIN 2 G: 330 INJECTION, POWDER, FOR SOLUTION INTRAMUSCULAR; INTRAVENOUS at 08:18

## 2024-01-26 RX ADMIN — FENTANYL CITRATE 25 MCG: 0.05 INJECTION, SOLUTION INTRAMUSCULAR; INTRAVENOUS at 08:10

## 2024-01-26 RX ADMIN — FENTANYL CITRATE 25 MCG: 0.05 INJECTION, SOLUTION INTRAMUSCULAR; INTRAVENOUS at 09:34

## 2024-01-26 RX ADMIN — MIDAZOLAM HYDROCHLORIDE 2 MG: 1 INJECTION, SOLUTION INTRAMUSCULAR; INTRAVENOUS at 08:08

## 2024-01-26 ASSESSMENT — PAIN - FUNCTIONAL ASSESSMENT
PAIN_FUNCTIONAL_ASSESSMENT: 0-10

## 2024-01-26 ASSESSMENT — PAIN DESCRIPTION - LOCATION: LOCATION: BACK

## 2024-01-26 ASSESSMENT — COLUMBIA-SUICIDE SEVERITY RATING SCALE - C-SSRS
1. IN THE PAST MONTH, HAVE YOU WISHED YOU WERE DEAD OR WISHED YOU COULD GO TO SLEEP AND NOT WAKE UP?: NO
2. HAVE YOU ACTUALLY HAD ANY THOUGHTS OF KILLING YOURSELF?: NO
6. HAVE YOU EVER DONE ANYTHING, STARTED TO DO ANYTHING, OR PREPARED TO DO ANYTHING TO END YOUR LIFE?: NO

## 2024-01-26 ASSESSMENT — PAIN SCALES - GENERAL
PAINLEVEL_OUTOF10: 2
PAINLEVEL_OUTOF10: 2
PAINLEVEL_OUTOF10: 10 - WORST POSSIBLE PAIN
PAINLEVEL_OUTOF10: 2
PAINLEVEL_OUTOF10: 1
PAINLEVEL_OUTOF10: 5 - MODERATE PAIN
PAINLEVEL_OUTOF10: 2

## 2024-01-26 ASSESSMENT — ENCOUNTER SYMPTOMS: BACK PAIN: 1

## 2024-01-26 ASSESSMENT — PAIN DESCRIPTION - ORIENTATION: ORIENTATION: LEFT

## 2024-01-26 NOTE — ANESTHESIA POSTPROCEDURE EVALUATION
Patient: Kellen Shook    Procedure Summary       Date: 01/26/24 Room / Location: [facility] / [facility]    Anesthesia Start: 0802 Anesthesia Stop: 0942    Procedure: INTRACEPT L5/S1 (Back) Diagnosis:       Vertebrogenic low back pain      (M54.51)    Surgeons: Matty Perez MD Responsible Provider: DANIEL Shirley    Anesthesia Type: MAC ASA Status: 2            Anesthesia Type: MAC    Vitals Value Taken Time   /66 01/26/24 1056   Temp 36.2 °C (97.2 °F) 01/26/24 1055   Pulse 46 01/26/24 1056   Resp 13 01/26/24 1056   SpO2 95 % 01/26/24 1056   Vitals shown include unvalidated device data.    Anesthesia Post Evaluation    Patient location during evaluation: bedside  Patient participation: complete - patient participated  Level of consciousness: awake  Pain management: adequate  Airway patency: patent  Cardiovascular status: acceptable  Respiratory status: acceptable  Hydration status: acceptable  Postoperative Nausea and Vomiting: none        There were no known notable events for this encounter.

## 2024-01-26 NOTE — H&P
PAIN MANAGEMENT H&P    Date of Service: 1/26/2024  SUBJECTIVE    CHIEF COMPLAINT: Low Back Pain    HISTORY OF PRESENT ILLNESS    Kellen Shook is a 58 y.o. old female with PMH vertebrogenic low back pain who presents for Intracept procedure at L5 and S1.    Pain and overall medical condition unchanged from previous visit. Pt is appropriately NPO. Pt denies new-onset numbness, weakness, bowel/bladder incontinence.  Pt denies recent infection/abx use, allergy to Latex/iodine/contrast. Patient is currently taking the following blood thinner(s): None    REVIEW OF SYSTEMS  Review of Systems   Musculoskeletal:  Positive for back pain.   All other systems reviewed and are negative.      PAST MEDICAL HISTORY  Past Medical History:   Diagnosis Date    Acute cystitis with hematuria 01/02/2020    Acute cystitis with hematuria    Allergic rhinitis     Anesthesia of skin 10/28/2019    Numbness of fingers    Arthritis     Body mass index (BMI) 31.0-31.9, adult 01/12/2021    BMI 31.0-31.9,adult    Body mass index (BMI) 33.0-33.9, adult 02/24/2021    BMI 33.0-33.9,adult    Body mass index (BMI) 35.0-35.9, adult 03/16/2019    BMI 35.0-35.9,adult    Body mass index (BMI) 35.0-35.9, adult 08/24/2021    BMI 35.0-35.9,adult    Body mass index (BMI) 36.0-36.9, adult 02/24/2022    BMI 36.0-36.9,adult    Carpal tunnel syndrome, right upper limb 05/21/2020    Carpal tunnel syndrome of right wrist    Chronic pain disorder     Arthritis    Cognitive disorder 2020    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    CPAP (continuous positive airway pressure) dependence     Cutaneous abscess of face 12/26/2014    Facial abscess    Diverticulosis 2018    Seen with first colonoscopy    Dizziness 2023    Occasionally    Encounter for immunization 06/15/2020    Influenza vaccine administered    Encounter for screening for malignant neoplasm of cervix 07/21/2020    Encounter for screening for malignant neoplasm of cervix    Encounter for screening for  malignant neoplasm of colon 06/22/2019    Colon cancer screening    Encounter for screening for malignant neoplasm of rectum 06/22/2019    Screening for rectal cancer    Fibroid     Fibromyalgia, primary     H/O chronic eczema     Hyperlipidemia     Hypertension     Joint pain     Hip and back    Localized swelling, mass and lump, right upper limb 05/21/2020    Mass of finger of right hand    Low back pain, unspecified 11/18/2019    Acute low back pain    Lumbar disc disease     Degenerative  disc disease    Medial epicondylitis, unspecified elbow 10/20/2020    Epicondylitis elbow, medial    Neuromuscular disorder (CMS/HCC)     Parkinsons    Obesity     Osteoarthritis     Other abnormal and inconclusive findings on diagnostic imaging of breast 02/18/2016    Abnormal mammogram of left breast    Other abnormal findings in urine     Leukocytes in urine    Other acute sinusitis 03/16/2019    Other acute sinusitis, recurrence not specified    Other amnesia 12/16/2020    Memory disorder    Other conditions influencing health status 04/18/2013    Nephrolithiasis    Other conditions influencing health status 01/28/2016    History of dyspareunia    Other microscopic hematuria 03/17/2018    Hematuria, microscopic    Other specified disorders of breast 02/24/2021    Breast asymmetry    Other specified postprocedural states 02/27/2020    History of carpal tunnel surgery    Pain in unspecified elbow 10/20/2020    Elbow pain    Parkinson disease     Personal history of diseases of the skin and subcutaneous tissue 11/15/2017    History of dermatitis    Personal history of other diseases of the female genital tract 06/22/2019    History of postmenopausal bleeding    Personal history of other diseases of the musculoskeletal system and connective tissue 09/11/2019    History of spinal stenosis    Personal history of other diseases of the musculoskeletal system and connective tissue 01/27/2016    History of joint pain    Personal  history of other diseases of the nervous system and sense organs 11/20/2020    History of Parkinson's disease    Personal history of other endocrine, nutritional and metabolic disease 11/20/2020    History of familial combined hyperlipidemia    Personal history of other specified conditions 08/23/2021    History of breast lump    Personal history of other specified conditions 01/02/2020    History of urinary frequency    Personal history of other specified conditions 09/14/2020    History of chest pain    Polyp of colon     Hyperplastic colon polyp    Restless leg     Rosacea     Skin disorder     Eczema    Sleep apnea, obstructive     Snoring     Trigger finger, right ring finger 05/21/2020    Trigger ring finger of right hand    Trochanteric bursitis, left hip 04/25/2016    Trochanteric bursitis of left hip    Vertigo 2023    Happens occasionally     Past Surgical History:   Procedure Laterality Date    CARPAL TUNNEL RELEASE  02/17/2020    COLONOSCOPY  03/17/2018    Complete Colonoscopy    EPIDURAL BLOCK INJECTION      EXPLORATORY LAPAROTOMY  11/1/2002    For adhedsions from  tubal ligation    OTHER SURGICAL HISTORY  07/18/2013    Lysis Of Peritoneal Adhesions Laparoscopic    OTHER SURGICAL HISTORY  07/21/2020    Endoscopy    TRIGGER FINGER RELEASE  2/17/2020    TUBAL LIGATION  03/01/2021    Tubal Ligation     Family History   Problem Relation Name Age of Onset    Diabetes type II Mother Angelika Obie     Hypertension Mother Angelika Obie     Cancer Mother Angelika Patiñoker     Hearing loss Mother Angelika Obie     Hyperlipidemia Mother Angelika Obie     Heart attack Mother Angelika Obie     Arthritis Mother Angelika Ragland     Alcohol abuse Father Marty Obie     Cancer Father Marty Obie     Arthritis Sister Marietta Ilana     Hypertension Sister Madisyn Bob     No Known Problems Sister      Hypertension Brother Elia Obie     Heart attack Brother Elia Patiñoker     No Known Problems Brother      No Known Problems Brother       No Known Problems Brother      No Known Problems Son      No Known Problems Son      No Known Problems Son         CURRENT MEDICATIONS  No current facility-administered medications for this encounter.       ALLERGIES AND DRUG REACTIONS  Allergies   Allergen Reactions    Statins-Hmg-Coa Reductase Inhibitors Hives    Amoxicillin Rash and Unknown          OBJECTIVE  Visit Vitals  /75   Pulse 71   Temp 36.6 °C (97.8 °F) (Temporal)   Resp 16   Wt 59 kg (130 lb)   SpO2 98%   BMI 22.31 kg/m²   OB Status Postmenopausal   Smoking Status Never   BSA 1.63 m²     General: Lying comfortably in bed, NAD  Head: NCAT  Eyes: Sclera/conjunctiva clear, EOMI, PERRL  Nose/mouth: MMM  CV: Good distal pulses  Lungs: Good/equal chest excursion  Abdomen: Soft, ND  Ext: No cyanosis/edema  MSK: Able to move extremities  Neuro: AAOx3, grossly normal  Psych: affect nl      REVIEW OF LABORATORY DATA  I have reviewed the following lab results: CBC, UA, BMP          REVIEW OF RADIOLOGY DATA  I have reviewed the following:  Radiology Studies           [facility] MRI 8/2023    ASSESSMENT & PLAN  Kellen Shook is a 58 y.o. old female with PMH with Vertebrogenic low back pain who presents for Intracept Procedure at L5 and S1.     -      Discussed procedure risks/benefits in detail with patient. Pt meets medical necessity for procedure due to failure of conservative measures. Reviewed procedural risks including bleeding, infection, nerve damage, paralysis. Also reviewed mitigating factors such as screening for infection/blood thinner use, sterile precautions, and image-guidance when applicable. All questions answered. Pt/guardian expressed understanding and choose to proceed            Matty Perez MD  Anesthesiologist & Interventional Pain Physician   [facility]  O: [phone]  F: [phone]  7:27 AM  01/26/24

## 2024-01-26 NOTE — ANESTHESIA PREPROCEDURE EVALUATION
Patient: Kellen Shook    Procedure Information       Date/Time: 01/26/24 0745    Procedure: INTRACEPT L5/S1 - C-ARM, INTRACEPT - MONICA MOREL    Location: [facility] / [facility]    Surgeons: Matty Perez MD            Relevant Problems   Anesthesia (within normal limits)      Cardiovascular   (+) Benign essential hypertension   (+) Hyperlipidemia      GI   (+) GERD without esophagitis      Neuro/Psych   (+) Anxiety disorder   (+) Spondylosis of thoracic spine with radiculopathy   (+) Thoracic radiculopathy      Pulmonary   (+) CHAY on CPAP      Musculoskeletal   (+) DDD (degenerative disc disease), lumbar   (+) Disc displacement, lumbar   (+) Fibromyalgia   (+) Osteoarthritis of multiple joints   (+) Spondylosis of thoracic spine with radiculopathy   (+) Spondylosis without myelopathy or radiculopathy, lumbosacral region       Clinical information reviewed:   Tobacco  Allergies  Meds  Problems  Med Hx  Surg Hx  OB Status    Fam Hx  Soc Hx        NPO Detail:  NPO/Void Status  Date of Last Liquid: 01/25/24  Time of Last Liquid: 0045  Date of Last Solid: 01/25/24  Time of Last Solid: 1800         Physical Exam    Airway  Mallampati: II  TM distance: >3 FB     Cardiovascular    Dental - normal exam     Pulmonary    Abdominal            Anesthesia Plan    History of general anesthesia?: yes  History of complications of general anesthesia?: no    ASA 2     MAC     intravenous induction   Anesthetic plan and risks discussed with patient.

## 2024-01-26 NOTE — POST-PROCEDURE NOTE
1058-REPORT RECEIVED.  I BROUGHT PATIENT OVER FROM PACU, SHE IS ALERT AND AWAKE.  1 SMALL DRESSING TO BILATERAL LOWER FLANKS.  BOTH C/D/I AND NO DRAINAGE.  PAIN IS 1/10.  SNACK PROVIDED.  SPOUSE AT THE BEDSIDE.      1113-TOLERATING SNACK WITHOUT N&V.      1125-DISCHARGE INSTRUCTIONS REVIEWED WITH PATIENT AND SPOUSE, VERBALIZED UNDERSTANDING.  RX TO GO DELIVERED MEDS.

## 2024-01-26 NOTE — OP NOTE
INTRACEPT L5/S1 Operative Note     Date: 2024  OR Location: [facility]    Name: Kellen Shook, : 1965, Age: 58 y.o., MRN: [id number], Sex: female    Diagnosis  Pre-op Diagnosis     * Vertebrogenic low back pain [M54.51] Post-op Diagnosis     * Vertebrogenic low back pain [M54.51]     Procedures  INTRACEPT L5/S1  57379 - [facility] THERMAL DSTRJ INTRAOSSEOUS BVN 1ST 2 LMBR/SAC      Surgeons      * Matty Perez - Primary    Resident/Fellow/Other Assistant:  Surgeon(s) and Role:    Procedure Summary  Anesthesia: Monitor Anesthesia Care  ASA: II  Anesthesia Staff: CRNA: URMILA Shirley-CRNA  Estimated Blood Loss: 10mL  Intra-op Medications:   Administrations occurring from 0745 to 0945 on 24:   Medication Name Total Dose   BUPivacaine HCl (Marcaine) 0.5 % (5 mg/mL) injection 20 mL              Anesthesia Record               Intraprocedure I/O Totals          Intake    Ketamine 0.00 mL    The total shown is the total volume documented since Anesthesia Start was filed.    Propofol Drip 0.00 mL    The total shown is the total volume documented since Anesthesia Start was filed.    LR infusion 1000.00 mL    Total Intake 1000 mL          Specimen: No specimens collected     Staff:   Circulator: Henok Valenzuela RN  Scrub Person: Ama No         Drains and/or Catheters: * None in log *    Tourniquet Times:         Implants:     Findings: none    Indications: Kellen Shook is an 58 y.o. female who is having surgery for M54.51.     The patient was seen in the preoperative area. The risks, benefits, complications, treatment options, non-operative alternatives, expected recovery and outcomes were discussed with the patient. The possibilities of reaction to medication, pulmonary aspiration, injury to surrounding structures, bleeding, recurrent infection, the need for additional procedures, failure to diagnose a condition, and creating a complication requiring transfusion or operation were discussed with the  patient. The patient concurred with the proposed plan, giving informed consent.  The site of surgery was properly noted/marked if necessary per policy. The patient has been actively warmed in preoperative area. Preoperative antibiotics have been ordered and given within 1 hours of incision. Venous thrombosis prophylaxis have been ordered including bilateral sequential compression devices    Procedure Details:   Procedure: Intracept Procedure - L5 and S1 Basi-Vertebral Nerve Ablation    Patient was identified in the preoperative area were risks, benefits, alternatives were described informed consent was obtained.  The patient was then taken back to operating room 3 at [facility] with a were placed prone onto the procedure room table with pillows underneath her abdomen to decrease lumbar lordosis.  The lumbosacral area was then exposed and prepped and draped in normal sterile fashion.  The C-arm was sterilely draped in moved into position to visualize the L5 vertebral body in the AP and lateral plane.  Surgical time-out was then completed and perioperative antibiotics were then infused.  The C-arm was rotated to square off the superior endplate at L5 and rotator approximately 35 degrees to the right to obtain an oblique view with the facet in the midpoint of the vertebral body.  The superior lateral right L5 pedicle was identified, and the skin entry point identified and infiltrated with 1% lidocaine using 25 gauge 1 and 0.5 inch needle.  A 22 gauge 5 inch spinal needle was used to anesthetize the tract to the pedicle and periosteum and confirm introducer cannula trajectory.  A skin incision was made with an 11 blade scalpel.  The 8 gauge introducer cannula with ashli tip was introduced through the skin, subcutaneous tissue and paraspinal muscle until bony contact was made.  The position was checked in the AP and lateral plane.  Using a mallet, the trocar was then advanced through the pedicle to the  posterior aspect of the vertebral body using a combination of AP and lateral views to ensure appropriate traversing of the pedicle and no reaching of the pedicle medially.  Once the trocar was in the posterior aspect of the L5 vertebral body, the trocar was removed from the cannula and a curved cannula assembly with nitinol J-Stylet was inserted.  The wing night was rotated counter-clockwise permitting excursion of the J stylet.  The curved cannula assembly was then advanced using a mallet and 1-2 millimeter increments.  The J stylet was observed transverse the vertebral body in the AP and lateral views.  The J stylet was removed and replaced with a straight stylet to reach the BVM target.  Target was reached when the tip of the stylette was noted to be between 30-50% forward of the posterior wall of the L5 in the lateral view mid weight with between the superior and inferior endplates and across the midline of the L5 spinous process in the AP view.  The stylet was then removed.  The bipolar radiofrequency probe was connected to the generator and inserted into the introducer cannula.  The wing not was rotated counter-clockwise to retract the PEEK sleep to expose the proximal extra on the radiofrequency probe.  The BVM nerve was then ablated at 85 degrees Celsius for 7 minutes using [employer]'s standard algorithm.  While the ablation  was occurring at L5 the C-arm was moved to visualize the target at the superior lateral aspect of the  S1 using the approach similar to the S1 vertebral body.  The C-arm was rotated left to square off the superior endplate at and rotator approximately 35 degrees to the left to obtain oblique view with the facet in the midpoint of the vertebral body.  The superior lateral left S1 pedicle was identified and the skin entry point identified and infiltrated with 1% lidocaine using a 25 gauge 1/2 inch needle.  A 22 gauge 5 inch spinal needle was used anesthetize the tract to the pedicle and  periosteum and confirm introducer cannula trajectory.  A skin incision was made with 11 scalpel blade.  The 8 gauge introducer cannula with beveled tip was then introduced through the skin, subcutaneous tissue and paraspinal muscle until bony contact was made.  The position was checked in the AP and lateral plane.  Using a mallet, the trocar was then advanced through the pedicle to the posterior aspect the vertebral body using a combination of AP and lateral views to ensure appropriate traversing of the pedicle and no breach in the pedicle medially.  Once the trocar was in the posterior aspect of the S1 vertebral body, the trocar was removed from the cannula and curved cannula assembly with the nitinol J stylet was inserted.  The wing not was rotated counter-clockwise permitting excursion of the J stylet.  The curve cannula assembly was then advanced using a mallet and 1-2 millimeter increments.  The J stylet was observed to transverse the vertebral body in the AP and lateral views.  Target was achieved with the tip of the stylette was noted to be between 30-50% forward of the posterior wall of the S1 in the lateral view midway between the superior and inferior endplates and cross the midline of the S1 spinous process in the AP view.  The stylet was then removed.  The bipolar radiofrequency probe was removed from the previous vertebral body, clean and then inserted into the introducer cannula.  The when it was rotated clockwise to retract the sleeve to expose the proximal electrode on the radiofrequency probe the BVM nerve was then ablated at 85 degree Celsius for 15 minutes using RF she standard algorithm.  With all ablation is completed the instruments were removed from the vertebral bodies.  The surgical wounds were closed with Dermabond and a sterile dressing was applied.  The patient was returned to supine position anesthesia was reversed.  The patient tolerated the procedure without any issues was brought out  to the recovery room in stable condition where vital signs were monitored and pain was controlled.    Complications:  None; patient tolerated the procedure well.    Disposition: PACU - hemodynamically stable.  Condition: stable         Additional Details:     Attending Attestation: I was present and scrubbed for the entire procedure.    Matty Perez  Phone Number: [phone]

## 2024-01-28 DIAGNOSIS — M54.14 THORACIC RADICULOPATHY: ICD-10-CM

## 2024-01-28 DIAGNOSIS — M47.24 SPONDYLOSIS OF THORACIC SPINE WITH RADICULOPATHY: ICD-10-CM

## 2024-01-28 DIAGNOSIS — M47.817 SPONDYLOSIS WITHOUT MYELOPATHY OR RADICULOPATHY, LUMBOSACRAL REGION: ICD-10-CM

## 2024-01-29 RX ORDER — CELECOXIB 200 MG/1
200 CAPSULE ORAL DAILY
Qty: 30 CAPSULE | Refills: 0 | Status: SHIPPED | OUTPATIENT
Start: 2024-01-29 | End: 2024-02-22 | Stop reason: SDUPTHER

## 2024-02-01 ENCOUNTER — TELEPHONE (OUTPATIENT)
Dept: PAIN MEDICINE | Facility: CLINIC | Age: 59
End: 2024-02-01

## 2024-02-08 ENCOUNTER — LAB REQUISITION (OUTPATIENT)
Dept: LAB | Facility: HOSPITAL | Age: 59
End: 2024-02-08
Payer: COMMERCIAL

## 2024-02-08 LAB — SARS-COV-2 RNA RESP QL NAA+PROBE: DETECTED

## 2024-02-08 PROCEDURE — 87635 SARS-COV-2 COVID-19 AMP PRB: CPT

## 2024-02-13 ENCOUNTER — APPOINTMENT (OUTPATIENT)
Dept: PAIN MEDICINE | Facility: CLINIC | Age: 59
End: 2024-02-13
Payer: COMMERCIAL

## 2024-02-21 ENCOUNTER — OFFICE VISIT (OUTPATIENT)
Dept: PRIMARY CARE | Facility: CLINIC | Age: 59
End: 2024-02-21
Payer: COMMERCIAL

## 2024-02-21 VITALS
HEIGHT: 64 IN | WEIGHT: 134.2 LBS | BODY MASS INDEX: 22.91 KG/M2 | OXYGEN SATURATION: 98 % | HEART RATE: 76 BPM | DIASTOLIC BLOOD PRESSURE: 74 MMHG | SYSTOLIC BLOOD PRESSURE: 120 MMHG

## 2024-02-21 DIAGNOSIS — I10 HYPERTENSION, UNSPECIFIED TYPE: ICD-10-CM

## 2024-02-21 DIAGNOSIS — R09.81 NASAL CONGESTION: ICD-10-CM

## 2024-02-21 DIAGNOSIS — E78.5 HYPERLIPIDEMIA, UNSPECIFIED HYPERLIPIDEMIA TYPE: Primary | ICD-10-CM

## 2024-02-21 PROBLEM — M54.50 LOW BACK PAIN, UNSPECIFIED: Status: ACTIVE | Noted: 2024-02-21

## 2024-02-21 PROBLEM — K63.5 HYPERPLASTIC POLYP OF LARGE INTESTINE: Status: ACTIVE | Noted: 2024-02-21

## 2024-02-21 PROBLEM — R73.01 IMPAIRED FASTING GLUCOSE: Status: ACTIVE | Noted: 2023-03-15

## 2024-02-21 PROBLEM — Z98.890 HISTORY OF DECOMPRESSION OF MEDIAN NERVE: Status: ACTIVE | Noted: 2024-02-21

## 2024-02-21 PROBLEM — N32.81 OVERACTIVE BLADDER: Status: ACTIVE | Noted: 2024-02-21

## 2024-02-21 PROBLEM — N64.89 BREAST ASYMMETRY: Status: ACTIVE | Noted: 2024-02-21

## 2024-02-21 PROCEDURE — 3078F DIAST BP <80 MM HG: CPT | Performed by: PHYSICIAN ASSISTANT

## 2024-02-21 PROCEDURE — 99213 OFFICE O/P EST LOW 20 MIN: CPT | Performed by: PHYSICIAN ASSISTANT

## 2024-02-21 PROCEDURE — 1036F TOBACCO NON-USER: CPT | Performed by: PHYSICIAN ASSISTANT

## 2024-02-21 PROCEDURE — 3074F SYST BP LT 130 MM HG: CPT | Performed by: PHYSICIAN ASSISTANT

## 2024-02-21 RX ORDER — EZETIMIBE 10 MG/1
10 TABLET ORAL DAILY
Qty: 90 TABLET | Refills: 1 | Status: SHIPPED | OUTPATIENT
Start: 2024-02-21

## 2024-02-21 RX ORDER — LISINOPRIL 10 MG/1
10 TABLET ORAL DAILY
Qty: 90 TABLET | Refills: 1 | Status: SHIPPED | OUTPATIENT
Start: 2024-02-21

## 2024-02-21 ASSESSMENT — PATIENT HEALTH QUESTIONNAIRE - PHQ9
SUM OF ALL RESPONSES TO PHQ9 QUESTIONS 1 AND 2: 0
1. LITTLE INTEREST OR PLEASURE IN DOING THINGS: NOT AT ALL
2. FEELING DOWN, DEPRESSED OR HOPELESS: NOT AT ALL

## 2024-02-21 ASSESSMENT — ENCOUNTER SYMPTOMS
LOSS OF SENSATION IN FEET: 0
OCCASIONAL FEELINGS OF UNSTEADINESS: 0
DEPRESSION: 0

## 2024-02-21 NOTE — PROGRESS NOTES
"Subjective   Patient ID: Kellen Shook is a 58 y.o. female who presents for post covid  (Patient states the left side of her neck is still swollen post covid. Patient had covid 8-12th. Patient states the morning of the 9th she had a syncopal event and hit her right shoulder and back of the head. She did not go to ED ).    Presents for follow up - recent covid infection and recovery. Lingering congestion, cough, nasal drainage. Tested positive 2/8. Had syncopal event 2/9 without complication attributed to covid infection.   Also due for refills on medication.            Review of Systems   All other systems reviewed and are negative.      Objective   /74   Pulse 76   Ht 1.626 m (5' 4\")   Wt 60.9 kg (134 lb 3.2 oz)   SpO2 98%   BMI 23.04 kg/m²     Physical Exam  Constitutional:       General: She is not in acute distress.  HENT:      Right Ear: Tympanic membrane and ear canal normal.      Left Ear: Tympanic membrane and ear canal normal.      Nose: Congestion present.      Mouth/Throat:      Pharynx: No posterior oropharyngeal erythema.   Eyes:      Conjunctiva/sclera: Conjunctivae normal.   Cardiovascular:      Rate and Rhythm: Normal rate and regular rhythm.      Heart sounds: No murmur heard.  Pulmonary:      Breath sounds: Normal breath sounds.   Lymphadenopathy:      Cervical: No cervical adenopathy.         Assessment/Plan   Diagnoses and all orders for this visit:  Hyperlipidemia, unspecified hyperlipidemia type  -     ezetimibe (Zetia) 10 mg tablet; Take 1 tablet (10 mg) by mouth once daily. MORNING  Hypertension, unspecified type  -     lisinopril 10 mg tablet; Take 1 tablet (10 mg) by mouth once daily.  Nasal congestion         "

## 2024-02-22 ENCOUNTER — APPOINTMENT (OUTPATIENT)
Dept: NEUROLOGY | Facility: CLINIC | Age: 59
End: 2024-02-22
Payer: COMMERCIAL

## 2024-02-22 ENCOUNTER — OFFICE VISIT (OUTPATIENT)
Dept: PAIN MEDICINE | Facility: CLINIC | Age: 59
End: 2024-02-22
Payer: COMMERCIAL

## 2024-02-22 VITALS
HEIGHT: 64 IN | WEIGHT: 134 LBS | HEART RATE: 52 BPM | SYSTOLIC BLOOD PRESSURE: 108 MMHG | DIASTOLIC BLOOD PRESSURE: 66 MMHG | RESPIRATION RATE: 16 BRPM | BODY MASS INDEX: 22.88 KG/M2

## 2024-02-22 DIAGNOSIS — M54.2 NECK PAIN: ICD-10-CM

## 2024-02-22 DIAGNOSIS — M47.24 SPONDYLOSIS OF THORACIC SPINE WITH RADICULOPATHY: ICD-10-CM

## 2024-02-22 DIAGNOSIS — M47.817 SPONDYLOSIS WITHOUT MYELOPATHY OR RADICULOPATHY, LUMBOSACRAL REGION: ICD-10-CM

## 2024-02-22 DIAGNOSIS — M54.14 THORACIC RADICULOPATHY: ICD-10-CM

## 2024-02-22 DIAGNOSIS — G89.29 OTHER CHRONIC PAIN: ICD-10-CM

## 2024-02-22 DIAGNOSIS — M54.51 VERTEBROGENIC LOW BACK PAIN: Primary | ICD-10-CM

## 2024-02-22 PROCEDURE — 3074F SYST BP LT 130 MM HG: CPT | Performed by: ANESTHESIOLOGY

## 2024-02-22 PROCEDURE — 99214 OFFICE O/P EST MOD 30 MIN: CPT | Performed by: ANESTHESIOLOGY

## 2024-02-22 PROCEDURE — 3078F DIAST BP <80 MM HG: CPT | Performed by: ANESTHESIOLOGY

## 2024-02-22 PROCEDURE — 1036F TOBACCO NON-USER: CPT | Performed by: ANESTHESIOLOGY

## 2024-02-22 RX ORDER — CELECOXIB 200 MG/1
200 CAPSULE ORAL 2 TIMES DAILY
Qty: 60 CAPSULE | Refills: 3 | Status: SHIPPED | OUTPATIENT
Start: 2024-02-22 | End: 2024-02-26

## 2024-02-22 ASSESSMENT — PATIENT HEALTH QUESTIONNAIRE - PHQ9
1. LITTLE INTEREST OR PLEASURE IN DOING THINGS: NOT AT ALL
SUM OF ALL RESPONSES TO PHQ9 QUESTIONS 1 AND 2: 0
2. FEELING DOWN, DEPRESSED OR HOPELESS: NOT AT ALL

## 2024-02-22 ASSESSMENT — ENCOUNTER SYMPTOMS
LOSS OF SENSATION IN FEET: 1
DEPRESSION: 0
NEUROLOGICAL NEGATIVE: 1
PSYCHIATRIC NEGATIVE: 1
OCCASIONAL FEELINGS OF UNSTEADINESS: 1
HEMATOLOGIC/LYMPHATIC NEGATIVE: 1
EYES NEGATIVE: 1
RESPIRATORY NEGATIVE: 1
CONSTITUTIONAL NEGATIVE: 1
ENDOCRINE NEGATIVE: 1
BACK PAIN: 1
GASTROINTESTINAL NEGATIVE: 1
ALLERGIC/IMMUNOLOGIC NEGATIVE: 1
CARDIOVASCULAR NEGATIVE: 1

## 2024-02-22 ASSESSMENT — PAIN SCALES - GENERAL: PAINLEVEL_OUTOF10: 3

## 2024-02-22 ASSESSMENT — PAIN - FUNCTIONAL ASSESSMENT: PAIN_FUNCTIONAL_ASSESSMENT: 0-10

## 2024-02-22 ASSESSMENT — PAIN DESCRIPTION - DESCRIPTORS: DESCRIPTORS: ACHING;BURNING

## 2024-02-22 NOTE — PROGRESS NOTES
Subjective   Patient ID: Kellen Shook is a 58 y.o. female who presents for Back Pain.  Back Pain  Patient here today for follow-up from her Intracept procedure.  She is reporting 100% pain relief in her low back.  She reports no back pain with sitting or with lumbar flexion.  No burning back pain.  She is very happy with the outcomes of this.  She did have a little bit of leg pain after the procedure which is now intermittent but much better.  She is back to working full-time.  She would like a refill on the Celebrex twice a day.  She did attempt to try it once a day but it was very challenging for her to continue to go to work.  Is very helpful and keeps her pain to a 0 or 1.  She continues to have neck pain and thoracic pain.  She had thoracic x-rays completed but would like to have cervical x-rays completed to see if there are any issues or worsening issues that she can take care of with with physical therapy and/or intervention.  She reports no radiating pain down her arms.  She reports no numbness, tingling, weakness in the upper extremities at this time.    Review of Systems   Constitutional: Negative.    HENT: Negative.     Eyes: Negative.    Respiratory: Negative.     Cardiovascular: Negative.    Gastrointestinal: Negative.    Endocrine: Negative.    Genitourinary: Negative.    Musculoskeletal:  Positive for back pain.   Skin: Negative.    Allergic/Immunologic: Negative.    Neurological: Negative.    Hematological: Negative.    Psychiatric/Behavioral: Negative.         Objective   Physical Exam  Vitals and nursing note reviewed.   Constitutional:       Appearance: Normal appearance.   HENT:      Head: Normocephalic and atraumatic.      Right Ear: Ear canal and external ear normal.      Left Ear: Ear canal and external ear normal.      Nose: Nose normal.      Mouth/Throat:      Mouth: Mucous membranes are moist.      Pharynx: Oropharynx is clear.   Eyes:      Conjunctiva/sclera: Conjunctivae normal.       Pupils: Pupils are equal, round, and reactive to light.   Cardiovascular:      Rate and Rhythm: Normal rate.   Pulmonary:      Effort: Pulmonary effort is normal. No respiratory distress.   Musculoskeletal:      Cervical back: Normal range of motion and neck supple. No tenderness. Pain with movement present.      Lumbar back: Tenderness present. Decreased range of motion.        Back:    Skin:     General: Skin is warm and dry.   Neurological:      Mental Status: She is alert and oriented to person, place, and time.      Sensory: Sensation is intact.      Motor: Motor function is intact.      Coordination: Coordination is intact.      Gait: Gait is intact.      Deep Tendon Reflexes:      Reflex Scores:       Bicep reflexes are 2+ on the right side and 2+ on the left side.       Brachioradialis reflexes are 2+ on the right side and 2+ on the left side.     Comments: [place] -   Psychiatric:         Mood and Affect: Mood normal.         Thought Content: Thought content normal.       Assessment/Plan   Problem List Items Addressed This Visit             ICD-10-CM       Neuro    Spondylosis without myelopathy or radiculopathy, lumbosacral region M47.817    Relevant Medications    celecoxib (CeleBREX) 200 mg capsule    Thoracic radiculopathy M54.14    Relevant Medications    celecoxib (CeleBREX) 200 mg capsule    Spondylosis of thoracic spine with radiculopathy M47.24    Relevant Medications    celecoxib (CeleBREX) 200 mg capsule     Other Visit Diagnoses         Codes    Vertebrogenic low back pain    -  Primary M54.51    Other chronic pain     G89.29    Neck pain     M54.2    Relevant Orders    XR cervical spine complete 4-5 views          I nice discussion with the patient today our plan will be as follows.    Radiology: Patient to have Cervical Xrays completed.    Patient with spondylossi at T11/12 with osteophytes.      Physically:  Continue to work out and move without any restrictions.      Psychologically:   no    Medication: Celebrex 200 mg 1 tablet twice daily refilled for the next 3 months    Duration: Greater than 1 year.    Intervention: Patient with 100% pain relief after Intracept procedure.  Patient is 1 month out she is feeling great.  Her biggest issues are her thoracic spine and cervical spine.  We will continue to work this up and discuss interventional options at next office visit.         Matty Perez MD 02/22/24 10:28 AM

## 2024-02-23 ENCOUNTER — HOSPITAL ENCOUNTER (OUTPATIENT)
Dept: RADIOLOGY | Facility: CLINIC | Age: 59
Discharge: HOME | End: 2024-02-23
Payer: COMMERCIAL

## 2024-02-23 DIAGNOSIS — M54.2 NECK PAIN: ICD-10-CM

## 2024-02-23 PROCEDURE — 72050 X-RAY EXAM NECK SPINE 4/5VWS: CPT

## 2024-02-25 DIAGNOSIS — M47.817 SPONDYLOSIS WITHOUT MYELOPATHY OR RADICULOPATHY, LUMBOSACRAL REGION: ICD-10-CM

## 2024-02-25 DIAGNOSIS — M47.24 SPONDYLOSIS OF THORACIC SPINE WITH RADICULOPATHY: ICD-10-CM

## 2024-02-25 DIAGNOSIS — M54.14 THORACIC RADICULOPATHY: ICD-10-CM

## 2024-02-26 RX ORDER — CELECOXIB 200 MG/1
200 CAPSULE ORAL DAILY
Qty: 30 CAPSULE | Refills: 1 | Status: SHIPPED | OUTPATIENT
Start: 2024-02-26 | End: 2024-04-02 | Stop reason: ALTCHOICE

## 2024-03-01 ENCOUNTER — LAB (OUTPATIENT)
Dept: LAB | Facility: LAB | Age: 59
End: 2024-03-01
Payer: COMMERCIAL

## 2024-03-01 DIAGNOSIS — E78.5 HYPERLIPIDEMIA, UNSPECIFIED HYPERLIPIDEMIA TYPE: ICD-10-CM

## 2024-03-01 LAB
ALBUMIN SERPL BCP-MCNC: 4.7 G/DL (ref 3.4–5)
ALP SERPL-CCNC: 53 U/L (ref 33–136)
ALT SERPL W P-5'-P-CCNC: 14 U/L (ref 7–45)
ANION GAP SERPL CALC-SCNC: 13 MMOL/L (ref 10–20)
AST SERPL W P-5'-P-CCNC: 15 U/L (ref 9–39)
BILIRUB SERPL-MCNC: 1.1 MG/DL (ref 0–1.2)
BUN SERPL-MCNC: 16 MG/DL (ref 6–23)
CALCIUM SERPL-MCNC: 9.6 MG/DL (ref 8.6–10.6)
CHLORIDE SERPL-SCNC: 104 MMOL/L (ref 98–107)
CHOLEST SERPL-MCNC: 220 MG/DL (ref 0–199)
CHOLESTEROL/HDL RATIO: 3.5
CO2 SERPL-SCNC: 29 MMOL/L (ref 21–32)
CREAT SERPL-MCNC: 0.65 MG/DL (ref 0.5–1.05)
EGFRCR SERPLBLD CKD-EPI 2021: >90 ML/MIN/1.73M*2
GLUCOSE SERPL-MCNC: 83 MG/DL (ref 74–99)
HDLC SERPL-MCNC: 62.5 MG/DL
LDLC SERPL CALC-MCNC: 143 MG/DL
NON HDL CHOLESTEROL: 158 MG/DL (ref 0–149)
POTASSIUM SERPL-SCNC: 4.3 MMOL/L (ref 3.5–5.3)
PROT SERPL-MCNC: 6.4 G/DL (ref 6.4–8.2)
SODIUM SERPL-SCNC: 142 MMOL/L (ref 136–145)
TRIGL SERPL-MCNC: 75 MG/DL (ref 0–149)
VLDL: 15 MG/DL (ref 0–40)

## 2024-03-01 PROCEDURE — 80053 COMPREHEN METABOLIC PANEL: CPT

## 2024-03-01 PROCEDURE — 80061 LIPID PANEL: CPT

## 2024-03-01 PROCEDURE — 36415 COLL VENOUS BLD VENIPUNCTURE: CPT

## 2024-03-04 ENCOUNTER — OFFICE VISIT (OUTPATIENT)
Dept: SLEEP MEDICINE | Facility: CLINIC | Age: 59
End: 2024-03-04
Payer: COMMERCIAL

## 2024-03-04 VITALS
HEIGHT: 64 IN | WEIGHT: 134 LBS | DIASTOLIC BLOOD PRESSURE: 62 MMHG | SYSTOLIC BLOOD PRESSURE: 100 MMHG | BODY MASS INDEX: 22.88 KG/M2 | OXYGEN SATURATION: 99 % | HEART RATE: 66 BPM

## 2024-03-04 DIAGNOSIS — G25.81 RLS (RESTLESS LEGS SYNDROME): ICD-10-CM

## 2024-03-04 DIAGNOSIS — J30.9 ALLERGIC RHINITIS, UNSPECIFIED SEASONALITY, UNSPECIFIED TRIGGER: ICD-10-CM

## 2024-03-04 DIAGNOSIS — G47.33 OSA ON CPAP: Primary | ICD-10-CM

## 2024-03-04 PROBLEM — J31.0 CHRONIC RHINITIS: Status: ACTIVE | Noted: 2024-03-04

## 2024-03-04 PROCEDURE — 99214 OFFICE O/P EST MOD 30 MIN: CPT | Performed by: INTERNAL MEDICINE

## 2024-03-04 PROCEDURE — 3074F SYST BP LT 130 MM HG: CPT | Performed by: INTERNAL MEDICINE

## 2024-03-04 PROCEDURE — 1036F TOBACCO NON-USER: CPT | Performed by: INTERNAL MEDICINE

## 2024-03-04 PROCEDURE — 3078F DIAST BP <80 MM HG: CPT | Performed by: INTERNAL MEDICINE

## 2024-03-04 ASSESSMENT — SLEEP AND FATIGUE QUESTIONNAIRES
HOW LIKELY ARE YOU TO NOD OFF OR FALL ASLEEP IN A CAR, WHILE STOPPED FOR A FEW MINUTES IN TRAFFIC: WOULD NEVER DOZE
HOW LIKELY ARE YOU TO NOD OFF OR FALL ASLEEP WHEN YOU ARE A PASSENGER IN A CAR FOR AN HOUR WITHOUT A BREAK: WOULD NEVER DOZE
HOW LIKELY ARE YOU TO NOD OFF OR FALL ASLEEP WHILE SITTING AND TALKING TO SOMEONE: WOULD NEVER DOZE
DIFFICULTY_FALLING_ASLEEP: MODERATE
WORRIED_DISTRESSED_DUE_TO_SLEEP: NOT AT ALL NOTICEABLE
SITING INACTIVE IN A PUBLIC PLACE LIKE A CLASS ROOM OR A MOVIE THEATER: WOULD NEVER DOZE
HOW LIKELY ARE YOU TO NOD OFF OR FALL ASLEEP WHILE WATCHING TV: WOULD NEVER DOZE
HOW LIKELY ARE YOU TO NOD OFF OR FALL ASLEEP WHILE LYING DOWN TO REST IN THE AFTERNOON WHEN CIRCUMSTANCES PERMIT: WOULD NEVER DOZE
HOW LIKELY ARE YOU TO NOD OFF OR FALL ASLEEP WHILE SITTING AND READING: WOULD NEVER DOZE
SLEEP_PROBLEM_INTERFERES_DAILY_ACTIVITIES: NOT AT ALL NOTICEABLE
HOW LIKELY ARE YOU TO NOD OFF OR FALL ASLEEP WHILE SITTING QUIETLY AFTER LUNCH WITHOUT ALCOHOL: WOULD NEVER DOZE
SATISFACTION_WITH_CURRENT_SLEEP_PATTERN: SATISFIED
SLEEP_PROBLEM_NOTICEABLE_TO_OTHERS: NOT AT ALL NOTICEABLE
ESS-CHAD TOTAL SCORE: 0

## 2024-03-04 ASSESSMENT — PAIN SCALES - GENERAL: PAINLEVEL: 0-NO PAIN

## 2024-03-04 NOTE — ASSESSMENT & PLAN NOTE
- Kellen Shook  has sleep apnea and requires treatment.  - Kellen Shook demonstrates good compliance and benefit from PAP therapy  - Continue Auto PAP 6-12 cmH20 through [facility].  - Order for renewal of PAP supplies placed   - Fitted with a F&P Sand 9 XS FFM

## 2024-03-21 ENCOUNTER — OFFICE VISIT (OUTPATIENT)
Dept: NEUROLOGY | Facility: CLINIC | Age: 59
End: 2024-03-21
Payer: COMMERCIAL

## 2024-03-21 VITALS
WEIGHT: 132 LBS | DIASTOLIC BLOOD PRESSURE: 66 MMHG | BODY MASS INDEX: 22.53 KG/M2 | HEIGHT: 64 IN | SYSTOLIC BLOOD PRESSURE: 114 MMHG | HEART RATE: 61 BPM

## 2024-03-21 DIAGNOSIS — G20.A1 PARKINSON DISEASE, SYMPTOMATIC (MULTI): Primary | ICD-10-CM

## 2024-03-21 PROCEDURE — 3074F SYST BP LT 130 MM HG: CPT

## 2024-03-21 PROCEDURE — 1036F TOBACCO NON-USER: CPT

## 2024-03-21 PROCEDURE — 99214 OFFICE O/P EST MOD 30 MIN: CPT

## 2024-03-21 PROCEDURE — 3078F DIAST BP <80 MM HG: CPT

## 2024-03-21 RX ORDER — CARBIDOPA AND LEVODOPA 25; 100 MG/1; MG/1
1 TABLET ORAL 4 TIMES DAILY
Qty: 360 TABLET | Refills: 3 | Status: SHIPPED | OUTPATIENT
Start: 2024-03-21

## 2024-03-21 NOTE — PATIENT INSTRUCTIONS
Since you are having wearing off at night and still working and awake about 7-8 hours after your last pill, you can add a tablet at around 9 pm. If you do not like the way this feels, you can instead try 1 tab in the morning, 1 tab in the afternoon, and 1.5 tab with your last dose.     I will definitely check out the book from [facility] that you recommended!     -Support groups may be helpful to patients and their families with Parkinson's disease. Organizations such as the Parkinson's Foundation, American Parkinson Disease Association, and National [place] of Neurological Disorders and Stroke may also be helpful as they can provide reliable information and resources. The Parkinson's Disease Handbook is also available through the American Parkinson Disease Association, which can provide helpful information to patients and families. In the [place] area, particular events and programs geared toward Parkinson's Disease include [employer], [employer], and the annual Parkinson's Bootcamp.   -Studies have suggested that exercise can slow the progression of disease, and it also can help patients feel better physically, mentally, and allow for social interaction. It also can help with the stiffness and bent posture that some patients with Parkinson's experience. Exercise routines should include things like dance and/or analy chi which can help with balance and flexibility. Other aerobic exercises like walking, biking, or swimming can also be helpful. It may be helpful to work with a physical therapist.   -Safety for patients with Parkinson's disease is very important. We want to prevent falls as much as possible, and this risk increases as the disease progresses. Consider changes in the home to make the bathtub or shower safer. Avoid have loose rugs around the house and try to make sure the house is well lit especially at night. Again, physical therapy may be helpful for falls prevention. Speech therapy may also be  "helpful to help with slurred or quiet speech, and to help avoid choking and problems swallowing. [facility] can help located Parkinson-trained speech therapists near you.   -Finally, Mediterranean style diet may help decrease the risk of dementia and Alzheimer 's disease. Some patients struggle with constipation from the Parkinson's disease or the medications. Make sure you speak with your provider if you are experiencing this, but some steps including increasing hydration or using a stool softener may be helpful.     The above information is paraphrased from \"UpToDate Patient Education: Parkinson disease treatment options- education, support, and therapy (Beyond The Basics)\"   "

## 2024-03-21 NOTE — PROGRESS NOTES
Subjective     Kellen Shook is a  58 y.o.female  with a past medical history of allergic rhinitis, anxiety, HTN, BPPV, chronic hip and back pain, fibromyalgia, GERD, HLD, CHAY on CPAP, PD, RLS who presents with   Chief Complaint   Patient presents with    Parkinson's Disease   . .    Visit type: follow up visit    HPI   Last seen by me in Sept 2023. At that time, PD sx were stable and we continued CDLD  TID. She was tolerating her medications well.     Since then, she has been     MOTOR SYMPTOMS:  Balance difficulty Yes some tripping over her R foot   Falls Yes -- passed out when she had Covid in Feb. May have been orthostatic, happened when standing up from the toilet   Exercise/PT Yes lots of steps at work   Help with ADLs No     NON MOTOR SYMPTOMS  Orthostatic lightheadedness Yes   Cognitive No  Depression/apathy Yes- more situational recently, lost her dog, sister not doing well   Anxiety Yes see above   Insomnia/RBD No  Fatigue Yes - having some issues with her CPAP supplies   Sialorrhea Yes asleep  Hypophonia No  Dysphagia Yes more with solids lately  Constipation Yes -- not constipated per se but everything moves slow, 2 years.   Urinary dysfunction No better since oxybutynin      Meds: CDLD  1 tab TID 9a, 1p, 545p   SE: Denies hallucinations, dyskinesia, impulse control, sudden sleep, edema  Time to turn on: 15-20 minutes   Wearing off: about 4 hrs but slows down a lot at night because she is working until 1230-1 am.      Review of Systems  10 point review of systems performed and is negative except as noted in the HPI.     All other systems have been reviewed and are negative for complaint.    Past Medical History:   Diagnosis Date    Acute cystitis with hematuria 01/02/2020    Acute cystitis with hematuria    Allergic rhinitis     Anesthesia of skin 10/28/2019    Numbness of fingers    Arthritis     Body mass index (BMI) 31.0-31.9, adult 01/12/2021    BMI 31.0-31.9,adult    Body mass index (BMI)  33.0-33.9, adult 02/24/2021    BMI 33.0-33.9,adult    Body mass index (BMI) 35.0-35.9, adult 03/16/2019    BMI 35.0-35.9,adult    Body mass index (BMI) 35.0-35.9, adult 08/24/2021    BMI 35.0-35.9,adult    Body mass index (BMI) 36.0-36.9, adult 02/24/2022    BMI 36.0-36.9,adult    Carpal tunnel syndrome, right upper limb 05/21/2020    Carpal tunnel syndrome of right wrist    Chronic pain disorder     Arthritis    Cognitive disorder 2020    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    CPAP (continuous positive airway pressure) dependence     Cutaneous abscess of face 12/26/2014    Facial abscess    Diverticulosis 2018    Seen with first colonoscopy    Dizziness 2023    Occasionally    Encounter for immunization 06/15/2020    Influenza vaccine administered    Encounter for screening for malignant neoplasm of cervix 07/21/2020    Encounter for screening for malignant neoplasm of cervix    Encounter for screening for malignant neoplasm of colon 06/22/2019    Colon cancer screening    Encounter for screening for malignant neoplasm of rectum 06/22/2019    Screening for rectal cancer    Fibroid     Fibromyalgia, primary     H/O chronic eczema     History of decompression of median nerve 2/21/2024    Hyperlipidemia     Hypertension     Joint pain     Hip and back    Localized swelling, mass and lump, right upper limb 05/21/2020    Mass of finger of right hand    Low back pain, unspecified 11/18/2019    Acute low back pain    Lumbar disc disease     Degenerative  disc disease    Medial epicondylitis, unspecified elbow 10/20/2020    Epicondylitis elbow, medial    Neuromuscular disorder (CMS/HCC)     Parkinsons    Obesity     Osteoarthritis     Other abnormal and inconclusive findings on diagnostic imaging of breast 02/18/2016    Abnormal mammogram of left breast    Other abnormal findings in urine     Leukocytes in urine    Other acute sinusitis 03/16/2019    Other acute sinusitis, recurrence not specified    Other amnesia  12/16/2020    Memory disorder    Other conditions influencing health status 04/18/2013    Nephrolithiasis    Other conditions influencing health status 01/28/2016    History of dyspareunia    Other microscopic hematuria 03/17/2018    Hematuria, microscopic    Other specified disorders of breast 02/24/2021    Breast asymmetry    Other specified postprocedural states 02/27/2020    History of carpal tunnel surgery    Pain in unspecified elbow 10/20/2020    Elbow pain    Parkinson disease     Personal history of diseases of the skin and subcutaneous tissue 11/15/2017    History of dermatitis    Personal history of other diseases of the female genital tract 06/22/2019    History of postmenopausal bleeding    Personal history of other diseases of the musculoskeletal system and connective tissue 09/11/2019    History of spinal stenosis    Personal history of other diseases of the musculoskeletal system and connective tissue 01/27/2016    History of joint pain    Personal history of other diseases of the nervous system and sense organs 11/20/2020    History of Parkinson's disease    Personal history of other endocrine, nutritional and metabolic disease 11/20/2020    History of familial combined hyperlipidemia    Personal history of other specified conditions 08/23/2021    History of breast lump    Personal history of other specified conditions 01/02/2020    History of urinary frequency    Personal history of other specified conditions 09/14/2020    History of chest pain    Polyp of colon     Hyperplastic colon polyp    Restless leg     Rosacea     Skin disorder     Eczema    Sleep apnea, obstructive     Snoring     Trigger finger, right ring finger 05/21/2020    Trigger ring finger of right hand    Trochanteric bursitis, left hip 04/25/2016    Trochanteric bursitis of left hip    Vertigo 2023    Happens occasionally       No relevant family history has been documented for this patient.    Past Surgical History:   Procedure  Laterality Date    CARPAL TUNNEL RELEASE  02/17/2020    COLONOSCOPY  03/17/2018    Complete Colonoscopy    EPIDURAL BLOCK INJECTION      EXPLORATORY LAPAROTOMY  11/1/2002    For adhedsions from  tubal ligation    OTHER SURGICAL HISTORY  07/18/2013    Lysis Of Peritoneal Adhesions Laparoscopic    OTHER SURGICAL HISTORY  07/21/2020    Endoscopy    TRIGGER FINGER RELEASE  2/17/2020    TUBAL LIGATION  03/01/2021    Tubal Ligation       Social History     Substance and Sexual Activity   Drug Use Never     Tobacco Use: Low Risk  (3/21/2024)    Patient History     Smoking Tobacco Use: Never     Smokeless Tobacco Use: Never     Passive Exposure: Not on file     Alcohol Use: Not on file           Objective     Neurological Exam   Alert and oriented to person, place, time, and situation.   Facial muscles are symmetric with masked facies.  Ocular versions intact.   Speech fluent to history and without hypophonia.   Mildly increased tone on the right upper and lower extremity.  Bradykinetic in BUE, much worse on the R assessed by finger tapping, and bradykinetic RLE assessed by foot tapping.   No tremors or dyskinesia.  Normal posture. Normal gait without shuffling or decreased arm swing. No on-block turning or retropulsion.    Last dose about 1 hour before exam.       Results for orders placed or performed during the hospital encounter of 02/23/24   XR cervical spine complete 4-5 views    Narrative    Interpreted By:  Zafar Rojas,   STUDY:  XR CERVICAL SPINE COMPLETE 4-5 VIEWS; 2/23/2024 10:18 am      INDICATION:  Signs/Symptoms:neck pain;      COMPARISON:  None available.      ACCESSION NUMBER(S):  [id number]      ORDERING CLINICIAN:  ELSA GOMEZ      TECHNIQUE:  Views: AP, lateral,  and bilateral oblique      FINDINGS:  RESULT: Alignment is within normal limits. No prevertebral soft  tissue swelling is noted. No loss vertebral body height is noted.  There is disc space narrowing endplate osteophyte formation  most  pronounced involving the C5-6 level. Left neural foramina appear  unremarkable. Evaluation of right neural foramina is limited due to  positioning.        Impression    Cervical spondylosis          Signed by: Zafar Rojas 2/23/2024 11:57 AM  Dictation workstation:   [id number]   Results for orders placed or performed during the hospital encounter of 08/08/23   MR lumbar spine wo IV contrast    Narrative    PROCEDURE:         SPINE LUMBAR WO CONTRAST - TMR  2010  REASON FOR EXAM: SPONDYLS W/O MYELOPATHY OR RADICULOPATHY, LUMBOSACR REGION    RESULT: MRN: [id number]  Patient Name: DANISHA BLAND    STUDY:  SPINE LUMBAR WO CONTRAST; 8/8/2023 11:53 am    INDICATION:  SPONDYLS W/O MYELOPATHY OR RADICULOPATHY, LUMBOSACR REGION.    COMPARISON:  None.    ACCESSION NUMBER(S):  [id number]    ORDERING CLINICIAN:  ELSA GOMEZ    TECHNIQUE:  Multiplanar T1, T2 and STIR images were obtained.    FINDINGS:  The conus medullaris is normal in appearance. The marrow space signal  intensity is without evidence for acute fracture, marrow space infection or  marrow space neoplasm.  Degenerative marrow space edema is identified along the left side of the L2  vertebral body. The thecal sac and intervertebral foramina are capacious  from the midportion of T10 through the midportion of L1.    L1-L2:  The thecal sac is capacious at this level. The intervertebral foramina are  widely patent.    L2-L3:  There is minimal disc bulging at L2-3. Thecal sac and intervertebral  foramina are capacious at this level.    L3-L4:  There is a broad-based effacement of the thecal sac at L3-4 from a disc  herniation measuring approximately 4 mm in the midline. There is a  moderate-to-large lateral disc herniation identified at this level measuring  on the order of 8 mm in diameter extending into the inferior aspect of the  left L3-4 foramen. There is also disc herniation extending laterally on the  right somewhat into the right L3-4 foramen. Lateral  herniation is more  marked on the left compared to the right.    L4-L5:  There is a mild broad-based disc herniation at L4-5 minimally effacing the  thecal sac measuring approximately 3 mm in AP diameter, slightly more marked  to the left of midline extending into the left lateral recess. There is  slight lateral disc protrusion into the inferior aspect of the left L3-4  foramen. No nerve root compression is noted.    L5-S1:  There is narrowing of the L5-S1 disc space with degenerative fatty  metamorphosis of the L5 and S1 marrow space adjacent to the narrowed disc  space. There is a central disc herniation minimally effacing the thecal sac  in the midline with the disc herniation measuring 5 mm. There is slight  foraminal narrowing bilaterally result of the loss in height of the disc  space along with mild encroachment from osteophyte disc formation.    IMPRESSION:  Diffuse lumbar spondylosis is present as detailed above. There is no  evidence for spinal canal stenosis. There is multilevel disc protrusion  herniation as described. There is also multilevel foraminal encroachment as  described most prominent at L3-4. Again please see the individual disc level  report above.  Dictation workstation:   [id number]    Original Interpreting Physician:   JADE FAN M.D.  Original Transcribed by/Date: MMODAL Aug  8 2023  9:45A  Original Electronically Signed by/Date: JADE FAN M.D. Aug  8 2023  1:01P    Addendum Interpreting Physician:  Addendum Transcribed by/Date: NO ADDENDUM  Addendum Electronically Signed by/Date:     Results for orders placed or performed in visit on 06/20/23   XR thoracic spine 2 views    Narrative    PROCEDURE:         SPINE THORACIC 2 VIEW - TXR  0114  REASON FOR EXAM: acute left sided thoracic back pain    RESULT: MRN: [id number]  Patient Name: DANISHA BLAND    STUDY:  SPINE THORACIC 2 VIEW; 6/20/2023 2:59 pm    INDICATION:  acute left sided thoracic back pain history of  Parkinson's    COMPARISON:  CTA chest 09/14/2020    ACCESSION NUMBER(S):  [id number]    ORDERING CLINICIAN:  ELSA GOMEZ    TECHNIQUE:  3 views of the thoracic spine were performed.    FINDINGS:  No sign of thoracic spinal compression deformity or spondylolisthesis.  Thoracic kyphosis is maintained. There is moderate disc space narrowing with  osteophyte formation in the mid to lower thoracic spine. Prominent lateral  osteophytes are particularly noted at T6-T7 through T11-T12.    IMPRESSION:  Thoracic spondylosis, as detailed above.    If symptoms persist, consider MRI for further evaluation.  Dictation workstation:   [id number]    Original Interpreting Physician:   DARÍO SYKES MD  Original Transcribed by/Date: MMODAL Jun 20 2023  2:35P  Original Electronically Signed by/Date: DARÍO SYKES MD Jun 20 2023  3:15P    Addendum Interpreting Physician:  Addendum Transcribed by/Date: NO ADDENDUM  Addendum Electronically Signed by/Date:     Results for orders placed or performed in visit on 04/26/23   XR lumbar spine 2-3 views    Narrative    PROCEDURE:         SPINE LUMBOSACRAL 2 VIEW - TXR  0032  REASON FOR EXAM: LUMBOSACRAL PAIN    RESULT: MRN: [id number]  Patient Name: DANISHA BLAND    STUDY:  SPINE LUMBOSACRAL 2 VIEW; 4/26/2023 11:33 am    INDICATION:  LUMBOSACRAL PAIN. Pain    COMPARISON:  None available.    ACCESSION NUMBER(S):  [id number]    ORDERING CLINICIAN:  YARON CHAVARRIA    TECHNIQUE:  Views: AP, lateral, and coned-down L5-S1.    FINDINGS:  RESULT: The alignment is within normal limits. The lumbar vertebrae  demonstrate no evidence for wedge fracture or compression deformity. Disc  space narrowing endplate osteophyte formation is noted most pronounced at  the L5-S1 level. Hypertrophic facet changes are noted most pronounced at the  L5-S1 level. Atherosclerotic calcifications are noted involving the aorta.    IMPRESSION:  Degenerative changes of the lumbar spine.  Atherosclerosis of the  aorta.      Dictation workstation:   [id number]    Original Interpreting Physician:   MEL MESA MD  Original Transcribed by/Date: MMODAL Apr 26 2023 10:58A  Original Electronically Signed by/Date: MEL MESA MD Apr 26 2023  12:10P    Addendum Interpreting Physician:  Addendum Transcribed by/Date: NO ADDENDUM  Addendum Electronically Signed by/Date:     Results for orders placed or performed in visit on 11/09/20   MR BRAIN WO IV CONTRAST    Narrative    MRN: [id number]  Patient Name: DANISHA BLAND     STUDY:  MRI BRAIN WO;  11/9/2020 3:09 pm     INDICATION:  severe bradykinesai, right leg rigidity and mild amnesia  DENTAL  IMPLANT NO OTHER METAL.     COMPARISON:  None.     ACCESSION NUMBER(S):  [id number]     ORDERING CLINICIAN:  JONAHTON SANTANA     TECHNIQUE:  Axial T2, FLAIR, DWI, gradient echo T2 and sagittal and coronal T1  weighted images of brain were acquired.     FINDINGS:  CSF Spaces: The ventricles, sulci and basal cisterns are within  normal limits.     Parenchyma: There is no diffusion restriction abnormality to suggest  acute infarct.  Some minimal nonspecific periventricular and  subcortical T2 FLAIR hyperintensities.. There is no mass effect or  midline shift.     Paranasal Sinuses and Mastoids: Visualized paranasal sinuses and  mastoid air cells are unremarkable.     IMPRESSION:  Minimal nonspecific periventricular and subcortical T2 FLAIR  hyperintensities could reflect chronic small vessel ischemic changes  given the age of the patient.   Results for orders placed or performed in visit on 12/12/19   EMG    Narrative    Ordered by an unspecified provider.   Results for orders placed or performed in visit on 08/30/19   XR LUMBAR SPINE COMPLETE 4+ VIEWS    Narrative    MRN: [id number]  Patient Name: DANISHA BLAND     STUDY:  SPINE, LUMBOSACRAL  MIN 4 VIEWS;; 8/30/2019 5:03 pm     INDICATION:  54-year-old, low back pain, injury 3 days ago, taking heavy object.     COMPARISON:  None.     ACCESSION  NUMBER(S):  [id number]     ORDERING CLINICIAN:  SHANA ZHOU     FINDINGS:  The anatomic alignment of the vertebra is normal and all vertebra are  normal in height. There is severe degenerative narrowing of the L5  disc and mild narrowing of the L4 disc. The other discs are normal in  height. There is facet arthrosis at L5-S1 and, on the left, at L4-L5.  There is no acute fracture or subluxation.     IMPRESSION:  Lower lumbar degenerative changes most prominent at the lumbosacral  junction. No acute findings          Assessment/Plan   Problem List Items Addressed This Visit       Parkinson disease, symptomatic - Primary    Overview     On CDLD  1 tab TID          Current Assessment & Plan     Pt works 3pm - about 1 am but her last dose is at about 545 p and is having wearing off and slowing down at work. Can increase to 1 tab qid, 8/9a, 1p, 5p, 9p. If this dosing does not feel right for her, she can go down to 1 tab in the AM, 1 tab at lunch, and 1.5 tab in the evening.     FU in 6 months         Relevant Medications    carbidopa-levodopa (Sinemet)  mg tablet

## 2024-03-21 NOTE — ASSESSMENT & PLAN NOTE
Pt works 3pm - about 1 am but her last dose is at about 545 p and is having wearing off and slowing down at work. Can increase to 1 tab qid, 8/9a, 1p, 5p, 9p. If this dosing does not feel right for her, she can go down to 1 tab in the AM, 1 tab at lunch, and 1.5 tab in the evening.     FU in 6 months

## 2024-03-22 ENCOUNTER — OFFICE VISIT (OUTPATIENT)
Dept: PRIMARY CARE | Facility: CLINIC | Age: 59
End: 2024-03-22
Payer: COMMERCIAL

## 2024-03-22 VITALS
WEIGHT: 133.4 LBS | OXYGEN SATURATION: 98 % | HEART RATE: 76 BPM | DIASTOLIC BLOOD PRESSURE: 74 MMHG | SYSTOLIC BLOOD PRESSURE: 116 MMHG | BODY MASS INDEX: 22.9 KG/M2

## 2024-03-22 DIAGNOSIS — Z00.00 ROUTINE MEDICAL EXAM: ICD-10-CM

## 2024-03-22 DIAGNOSIS — Z12.31 BREAST CANCER SCREENING BY MAMMOGRAM: Primary | ICD-10-CM

## 2024-03-22 DIAGNOSIS — Z12.11 ENCOUNTER FOR SCREENING COLONOSCOPY: ICD-10-CM

## 2024-03-22 DIAGNOSIS — N95.2 POST-MENOPAUSAL ATROPHIC VAGINITIS: ICD-10-CM

## 2024-03-22 LAB
POC APPEARANCE, URINE: CLEAR
POC BILIRUBIN, URINE: NEGATIVE
POC BLOOD, URINE: NEGATIVE
POC COLOR, URINE: YELLOW
POC GLUCOSE, URINE: NEGATIVE MG/DL
POC KETONES, URINE: NEGATIVE MG/DL
POC LEUKOCYTES, URINE: NEGATIVE
POC NITRITE,URINE: NEGATIVE
POC PH, URINE: 6.5 PH
POC PROTEIN, URINE: NEGATIVE MG/DL
POC SPECIFIC GRAVITY, URINE: 1.01
POC UROBILINOGEN, URINE: 0.2 EU/DL

## 2024-03-22 PROCEDURE — 99396 PREV VISIT EST AGE 40-64: CPT | Performed by: PHYSICIAN ASSISTANT

## 2024-03-22 PROCEDURE — 3078F DIAST BP <80 MM HG: CPT | Performed by: PHYSICIAN ASSISTANT

## 2024-03-22 PROCEDURE — 81003 URINALYSIS AUTO W/O SCOPE: CPT | Mod: 91,QW | Performed by: PHYSICIAN ASSISTANT

## 2024-03-22 PROCEDURE — 1036F TOBACCO NON-USER: CPT | Performed by: PHYSICIAN ASSISTANT

## 2024-03-22 PROCEDURE — 3074F SYST BP LT 130 MM HG: CPT | Performed by: PHYSICIAN ASSISTANT

## 2024-03-22 RX ORDER — ESTRADIOL 10 UG/1
10 INSERT VAGINAL 2 TIMES WEEKLY
Qty: 8 TABLET | Refills: 11 | Status: SHIPPED | OUTPATIENT
Start: 2024-03-25 | End: 2024-04-26 | Stop reason: ALTCHOICE

## 2024-03-22 ASSESSMENT — ENCOUNTER SYMPTOMS
FATIGUE: 0
DEPRESSION: 0
FEVER: 0
SHORTNESS OF BREATH: 0
LOSS OF SENSATION IN FEET: 0
ABDOMINAL PAIN: 0
OCCASIONAL FEELINGS OF UNSTEADINESS: 0

## 2024-03-22 ASSESSMENT — PAIN SCALES - GENERAL: PAINLEVEL: 4

## 2024-03-22 ASSESSMENT — COLUMBIA-SUICIDE SEVERITY RATING SCALE - C-SSRS
6. HAVE YOU EVER DONE ANYTHING, STARTED TO DO ANYTHING, OR PREPARED TO DO ANYTHING TO END YOUR LIFE?: NO
2. HAVE YOU ACTUALLY HAD ANY THOUGHTS OF KILLING YOURSELF?: NO
1. IN THE PAST MONTH, HAVE YOU WISHED YOU WERE DEAD OR WISHED YOU COULD GO TO SLEEP AND NOT WAKE UP?: NO

## 2024-03-22 NOTE — PROGRESS NOTES
Subjective   Patient ID: Kellen Shook is a 58 y.o. female who presents for Annual Exam (Pt is here for physical, no new concerns / nr).    HPI   PMHx significant for HTN, HLD, CHAY (Dr. Narayanan), BPPV, fibromyalgia, RLS, Parkinson's (Dr. Metz, Unique Mcdonald), polyarthritis (Dr. Whitt), lumbar spondylosis (Dr. Perez)    HLD - stable on zetia. Statin intolerant.    HTN - stable on lisinopril, hydrochlorothiazide.    Parkinson's - recently saw neurology and had sinemet increased to 4x per day. Sister is 12 years older and was recently placed in a LTC facility. Also has an uncle w/parkinson's. She is starting to experience decreased motility in the gut but does not like to take laxatives.     Vaginal atrophy - reports severe atrophy w/friability. she was previously seeing women's health provider for topical estrace. She does not tolerate the cream very well and would like to try something else. Hx lichen sclerosis - flares controlled w/triamcinolone.      HM: due for mammogram, colonoscopy 2016 due for repeat      Review of Systems   Constitutional:  Negative for fatigue and fever.   Respiratory:  Negative for shortness of breath.    Cardiovascular:  Negative for chest pain.   Gastrointestinal:  Negative for abdominal pain.   Skin:  Negative for rash.       Objective   /74   Pulse 76   Wt 60.5 kg (133 lb 6.4 oz)   SpO2 98%   BMI 22.90 kg/m²     Physical Exam  Vitals reviewed.   Constitutional:       Appearance: Normal appearance.   HENT:      Head: Normocephalic and atraumatic.      Right Ear: Tympanic membrane and ear canal normal.      Left Ear: Tympanic membrane and ear canal normal.      Nose: Nose normal.      Mouth/Throat:      Mouth: Mucous membranes are moist.      Pharynx: No oropharyngeal exudate.   Eyes:      Extraocular Movements: Extraocular movements intact.      Conjunctiva/sclera: Conjunctivae normal.      Pupils: Pupils are equal, round, and reactive to light.   Cardiovascular:      Rate and  Rhythm: Normal rate and regular rhythm.      Heart sounds: Normal heart sounds.   Pulmonary:      Effort: Pulmonary effort is normal.      Breath sounds: Normal breath sounds. No wheezing.   Abdominal:      General: There is no distension.      Palpations: Abdomen is soft.      Tenderness: There is no abdominal tenderness.   Musculoskeletal:         General: No tenderness.      Cervical back: Normal range of motion and neck supple.   Skin:     General: Skin is warm and dry.      Findings: No rash.   Neurological:      General: No focal deficit present.      Mental Status: She is alert. Mental status is at baseline.   Psychiatric:         Mood and Affect: Mood normal.         Office Visit on 03/22/2024   Component Date Value Ref Range Status    POC Color, Urine 03/22/2024 Yellow  Straw, Yellow, Light-Yellow Final    POC Appearance, Urine 03/22/2024 Clear  Clear Final    POC Glucose, Urine 03/22/2024 NEGATIVE  NEGATIVE mg/dl Final    POC Bilirubin, Urine 03/22/2024 NEGATIVE  NEGATIVE Final    POC Ketones, Urine 03/22/2024 NEGATIVE  NEGATIVE mg/dl Final    POC Specific Gravity, Urine 03/22/2024 1.010  1.005 - 1.035 Final    POC Blood, Urine 03/22/2024 NEGATIVE  NEGATIVE Final    POC PH, Urine 03/22/2024 6.5  No Reference Range Established PH Final    POC Protein, Urine 03/22/2024 NEGATIVE  NEGATIVE, 30 (1+) mg/dl Final    POC Urobilinogen, Urine 03/22/2024 0.2  0.2, 1.0 EU/DL Final    Poc Nitrite, Urine 03/22/2024 NEGATIVE  NEGATIVE Final    POC Leukocytes, Urine 03/22/2024 NEGATIVE  NEGATIVE Final   Lab on 03/01/2024   Component Date Value Ref Range Status    Glucose 03/01/2024 83  74 - 99 mg/dL Final    Sodium 03/01/2024 142  136 - 145 mmol/L Final    Potassium 03/01/2024 4.3  3.5 - 5.3 mmol/L Final    Chloride 03/01/2024 104  98 - 107 mmol/L Final    Bicarbonate 03/01/2024 29  21 - 32 mmol/L Final    Anion Gap 03/01/2024 13  10 - 20 mmol/L Final    Urea Nitrogen 03/01/2024 16  6 - 23 mg/dL Final    Creatinine  03/01/2024 0.65  0.50 - 1.05 mg/dL Final    eGFR 03/01/2024 >90  >60 mL/min/1.73m*2 Final    Calcium 03/01/2024 9.6  8.6 - 10.6 mg/dL Final    Albumin 03/01/2024 4.7  3.4 - 5.0 g/dL Final    Alkaline Phosphatase 03/01/2024 53  33 - 136 U/L Final    Total Protein 03/01/2024 6.4  6.4 - 8.2 g/dL Final    AST 03/01/2024 15  9 - 39 U/L Final    Bilirubin, Total 03/01/2024 1.1  0.0 - 1.2 mg/dL Final    ALT 03/01/2024 14  7 - 45 U/L Final    Cholesterol 03/01/2024 220 (H)  0 - 199 mg/dL Final    HDL-Cholesterol 03/01/2024 62.5  mg/dL Final    Cholesterol/HDL Ratio 03/01/2024 3.5   Final    LDL Calculated 03/01/2024 143 (H)  <=99 mg/dL Final    VLDL 03/01/2024 15  0 - 40 mg/dL Final    Triglycerides 03/01/2024 75  0 - 149 mg/dL Final    Non HDL Cholesterol 03/01/2024 158 (H)  0 - 149 mg/dL Final   Lab Requisition on 02/08/2024   Component Date Value Ref Range Status    Coronavirus 2019, PCR 02/08/2024 Detected (A)  Not Detected Final   Pre-Admission Testing on 01/04/2024   Component Date Value Ref Range Status    Staph/MRSA Screen Culture 01/04/2024 No Staphylococcus aureus isolated   Final    Ventricular Rate 01/04/2024 55  BPM Final    Atrial Rate 01/04/2024 55  BPM Final    MT Interval 01/04/2024 142  ms Final    QRS Duration 01/04/2024 84  ms Final    QT Interval 01/04/2024 456  ms Final    QTC Calculation(Bazett) 01/04/2024 436  ms Final    P Axis 01/04/2024 68  degrees Final    R Axis 01/04/2024 43  degrees Final    T Axis 01/04/2024 58  degrees Final    QRS Count 01/04/2024 9  beats Final    Q Onset 01/04/2024 218  ms Final    P Onset 01/04/2024 147  ms Final    P Offset 01/04/2024 209  ms Final    T Offset 01/04/2024 446  ms Final    QTC Fredericia 01/04/2024 443  ms Final    WBC 01/04/2024 4.6  4.4 - 11.3 x10*3/uL Final    nRBC 01/04/2024 0.0  0.0 - 0.0 /100 WBCs Final    RBC 01/04/2024 4.45  4.00 - 5.20 x10*6/uL Final    Hemoglobin 01/04/2024 14.4  12.0 - 16.0 g/dL Final    Hematocrit 01/04/2024 42.7  36.0 - 46.0  % Final    MCV 01/04/2024 96  80 - 100 fL Final    MCH 01/04/2024 32.4  26.0 - 34.0 pg Final    MCHC 01/04/2024 33.7  32.0 - 36.0 g/dL Final    RDW 01/04/2024 12.0  11.5 - 14.5 % Final    Platelets 01/04/2024 206  150 - 450 x10*3/uL Final    Neutrophils % 01/04/2024 52.0  40.0 - 80.0 % Final    Immature Granulocytes %, Automated 01/04/2024 0.2  0.0 - 0.9 % Final    Lymphocytes % 01/04/2024 39.0  13.0 - 44.0 % Final    Monocytes % 01/04/2024 6.5  2.0 - 10.0 % Final    Eosinophils % 01/04/2024 1.7  0.0 - 6.0 % Final    Basophils % 01/04/2024 0.6  0.0 - 2.0 % Final    Neutrophils Absolute 01/04/2024 2.41  1.20 - 7.70 x10*3/uL Final    Immature Granulocytes Absolute, Au* 01/04/2024 0.01  0.00 - 0.70 x10*3/uL Final    Lymphocytes Absolute 01/04/2024 1.81  1.20 - 4.80 x10*3/uL Final    Monocytes Absolute 01/04/2024 0.30  0.10 - 1.00 x10*3/uL Final    Eosinophils Absolute 01/04/2024 0.08  0.00 - 0.70 x10*3/uL Final    Basophils Absolute 01/04/2024 0.03  0.00 - 0.10 x10*3/uL Final    Glucose 01/04/2024 89  65 - 99 mg/dL Final    Sodium 01/04/2024 137  133 - 145 mmol/L Final    Potassium 01/04/2024 3.8  3.4 - 5.1 mmol/L Final    Chloride 01/04/2024 99  97 - 107 mmol/L Final    Bicarbonate 01/04/2024 29  24 - 31 mmol/L Final    Urea Nitrogen 01/04/2024 15  8 - 25 mg/dL Final    Creatinine 01/04/2024 0.60  0.40 - 1.60 mg/dL Final    eGFR 01/04/2024 >90  >60 mL/min/1.73m*2 Final    Calcium 01/04/2024 9.8  8.5 - 10.4 mg/dL Final    Anion Gap 01/04/2024 9  <=19 mmol/L Final    Color, Urine 01/04/2024 Yellow  Light-Yellow, Yellow, Dark-Yellow Final    Appearance, Urine 01/04/2024 Clear  Clear Final    Specific Gravity, Urine 01/04/2024 1.013  1.005 - 1.035 Final    pH, Urine 01/04/2024 6.0  5.0, 5.5, 6.0, 6.5, 7.0, 7.5, 8.0 Final    Protein, Urine 01/04/2024 NEGATIVE  NEGATIVE, 10 (TRACE), 20 (TRACE) mg/dL Final    Glucose, Urine 01/04/2024 Normal  Normal mg/dL Final    Blood, Urine 01/04/2024 NEGATIVE  NEGATIVE Final    Ketones,  Urine 01/04/2024 NEGATIVE  NEGATIVE mg/dL Final    Bilirubin, Urine 01/04/2024 NEGATIVE  NEGATIVE Final    Urobilinogen, Urine 01/04/2024 Normal  Normal mg/dL Final    Nitrite, Urine 01/04/2024 NEGATIVE  NEGATIVE Final    Leukocyte Esterase, Urine 01/04/2024 NEGATIVE  NEGATIVE Final         Assessment/Plan   Problem List Items Addressed This Visit    None  Visit Diagnoses         Codes    Breast cancer screening by mammogram    -  Primary Z12.31    Relevant Orders    BI mammo bilateral screening tomosynthesis    Routine medical exam     Z00.00    Relevant Orders    POCT UA Automated manually resulted (Completed)    Encounter for screening colonoscopy     Z12.11    Relevant Orders    Referral to Gastroenterology    Post-menopausal atrophic vaginitis     N95.2    Relevant Medications    estradiol (Vagifem) 10 mcg tablet vaginal tablet (Start on 3/25/2024)

## 2024-03-29 ENCOUNTER — HOSPITAL ENCOUNTER (OUTPATIENT)
Dept: RADIOLOGY | Facility: HOSPITAL | Age: 59
Discharge: HOME | End: 2024-03-29
Payer: COMMERCIAL

## 2024-03-29 VITALS — BODY MASS INDEX: 22.2 KG/M2 | HEIGHT: 64 IN | WEIGHT: 130 LBS

## 2024-03-29 DIAGNOSIS — Z12.31 BREAST CANCER SCREENING BY MAMMOGRAM: ICD-10-CM

## 2024-03-29 PROCEDURE — 77067 SCR MAMMO BI INCL CAD: CPT | Performed by: RADIOLOGY

## 2024-03-29 PROCEDURE — 77067 SCR MAMMO BI INCL CAD: CPT

## 2024-03-29 PROCEDURE — 77063 BREAST TOMOSYNTHESIS BI: CPT | Performed by: RADIOLOGY

## 2024-04-01 PROBLEM — R09.81 NASAL CONGESTION: Status: ACTIVE | Noted: 2024-04-01

## 2024-04-02 ENCOUNTER — DOCUMENTATION (OUTPATIENT)
Dept: PHYSICAL THERAPY | Facility: CLINIC | Age: 59
End: 2024-04-02

## 2024-04-02 ENCOUNTER — TELEMEDICINE (OUTPATIENT)
Dept: PAIN MEDICINE | Facility: CLINIC | Age: 59
End: 2024-04-02
Payer: COMMERCIAL

## 2024-04-02 DIAGNOSIS — M47.814 THORACIC ARTHRITIS: ICD-10-CM

## 2024-04-02 DIAGNOSIS — M47.812 CERVICAL SPONDYLOSIS WITHOUT MYELOPATHY: Primary | ICD-10-CM

## 2024-04-02 DIAGNOSIS — M54.51 VERTEBROGENIC LOW BACK PAIN: ICD-10-CM

## 2024-04-02 DIAGNOSIS — G89.29 OTHER CHRONIC PAIN: ICD-10-CM

## 2024-04-02 PROCEDURE — 99214 OFFICE O/P EST MOD 30 MIN: CPT | Performed by: ANESTHESIOLOGY

## 2024-04-02 PROCEDURE — 1036F TOBACCO NON-USER: CPT | Performed by: ANESTHESIOLOGY

## 2024-04-02 RX ORDER — CELECOXIB 100 MG/1
100 CAPSULE ORAL 2 TIMES DAILY
Qty: 60 CAPSULE | Refills: 0 | Status: SHIPPED | OUTPATIENT
Start: 2024-04-02 | End: 2024-05-06

## 2024-04-02 ASSESSMENT — ENCOUNTER SYMPTOMS
NEUROLOGICAL NEGATIVE: 1
NECK STIFFNESS: 1
HEMATOLOGIC/LYMPHATIC NEGATIVE: 1
GASTROINTESTINAL NEGATIVE: 1
MYALGIAS: 1
PSYCHIATRIC NEGATIVE: 1
NECK PAIN: 1
CARDIOVASCULAR NEGATIVE: 1
ALLERGIC/IMMUNOLOGIC NEGATIVE: 1
EYES NEGATIVE: 1
CONSTITUTIONAL NEGATIVE: 1
ENDOCRINE NEGATIVE: 1
RESPIRATORY NEGATIVE: 1

## 2024-04-02 ASSESSMENT — PAIN DESCRIPTION - DESCRIPTORS: DESCRIPTORS: ACHING

## 2024-04-02 ASSESSMENT — PATIENT HEALTH QUESTIONNAIRE - PHQ9
1. LITTLE INTEREST OR PLEASURE IN DOING THINGS: NOT AT ALL
2. FEELING DOWN, DEPRESSED OR HOPELESS: NOT AT ALL
SUM OF ALL RESPONSES TO PHQ9 QUESTIONS 1 AND 2: 0

## 2024-04-02 ASSESSMENT — PAIN - FUNCTIONAL ASSESSMENT: PAIN_FUNCTIONAL_ASSESSMENT: 0-10

## 2024-04-02 ASSESSMENT — PAIN SCALES - GENERAL: PAINLEVEL_OUTOF10: 3

## 2024-04-02 NOTE — H&P (VIEW-ONLY)
Subjective   Patient ID: Kellen Shook is a 58 y.o. female who presents for Back Pain.  Back Pain    Patient here today for follow-up to discuss her spine pain.  She did have Intracept procedure completed over 2 months ago and continues to have no pain in her low back.  The biggest issues are her neck and her thoracic spine.  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  At her last office visit we did discuss medial branch blocks and she would like to move forward with these to see if she is a candidate for ablation.  She has severe pain in the center of her back with range of motion and use of her arms at the T7 level.  She reports no numbness, tingling or radiation around to her chest.  She also has neck pain with holding positions and looking down all day long and with range of motion and it will crack and feel very deep and achy.  She reports no radiation into the upper extremities.  She reports no numbness, tingling, weakness in the upper extremities at this time.    She also would like to discuss her Celebrex.  She is taking Celebrex 200 mg once a day and has found it to be very effective she has had some minor GI side effects and wonders if she can take it twice a day reduce the dose and take it twice a day.  She would like to know what her options are.  She is happy with her overall pain treatment.    Review of Systems   Constitutional: Negative.    HENT: Negative.     Eyes: Negative.    Respiratory: Negative.     Cardiovascular: Negative.    Gastrointestinal: Negative.    Endocrine: Negative.    Genitourinary: Negative.    Musculoskeletal:  Positive for myalgias, neck pain and neck stiffness.   Skin: Negative.    Allergic/Immunologic: Negative.    Neurological: Negative.    Hematological: Negative.    Psychiatric/Behavioral: Negative.         Objective   Physical Exam  Nursing note reviewed.   Constitutional:       Appearance: Normal  appearance.   Neurological:      Mental Status: She is alert.         Assessment/Plan   Problem List Items Addressed This Visit    None  Visit Diagnoses         Codes    Cervical spondylosis without myelopathy    -  Primary M47.812    Relevant Medications    celecoxib (CeleBREX) 100 mg capsule    Vertebrogenic low back pain     M54.51    Other chronic pain     G89.29    Thoracic arthritis     M47.814             I nice discussion with the patient today our plan will be as follows.    Radiology:   C-Spine Xray 2/22  RESULT: Alignment is within normal limits. No prevertebral soft  tissue swelling is noted. No loss vertebral body height is noted.  There is disc space narrowing endplate osteophyte formation most  pronounced involving the C5-6 level. Left neural foramina appear  unremarkable. Evaluation of right neural foramina is limited due to  positioning.      IMPRESSION:  Cervical spondylosis    Patient had previous thoracic x-rays completed which did show spondylosis and osteophyte formation at the T6-7 T7-8 levels.    Physically: I reiterated to the patient that she needs to take breaks throughout the day to do cervical range of motion and thoracic range of motion exercises.    Psychologically: No issues at this time.    Medication: I will move the patient to Celebrex 100 mg twice a day to see if it decreases some of the GI side effects and gives her of the option to take it twice a day.    Duration: Greater than 1 year.    Intervention:  Patient to have T6/7 and T7/8 MBB under fluoroscopic guidance and local anesthetic.  If the patient does achieve greater than 80% relief we will repeat this procedure again and then move towards radiofrequency ablation in the future.  Risks, benefit, and alternatives of the procedure were discussed with the patient.  Oswestry score has been compelted and recorded.      Matty Perez MD 04/02/24 8:25 AM

## 2024-04-02 NOTE — PROGRESS NOTES
Subjective   Patient ID: Kellen Shook is a 58 y.o. female who presents for Back Pain.  Back Pain    Patient here today for follow-up to discuss her spine pain.  She did have Intracept procedure completed over 2 months ago and continues to have no pain in her low back.  The biggest issues are her neck and her thoracic spine.  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  At her last office visit we did discuss medial branch blocks and she would like to move forward with these to see if she is a candidate for ablation.  She has severe pain in the center of her back with range of motion and use of her arms at the T7 level.  She reports no numbness, tingling or radiation around to her chest.  She also has neck pain with holding positions and looking down all day long and with range of motion and it will crack and feel very deep and achy.  She reports no radiation into the upper extremities.  She reports no numbness, tingling, weakness in the upper extremities at this time.    She also would like to discuss her Celebrex.  She is taking Celebrex 200 mg once a day and has found it to be very effective she has had some minor GI side effects and wonders if she can take it twice a day reduce the dose and take it twice a day.  She would like to know what her options are.  She is happy with her overall pain treatment.    Review of Systems   Constitutional: Negative.    HENT: Negative.     Eyes: Negative.    Respiratory: Negative.     Cardiovascular: Negative.    Gastrointestinal: Negative.    Endocrine: Negative.    Genitourinary: Negative.    Musculoskeletal:  Positive for myalgias, neck pain and neck stiffness.   Skin: Negative.    Allergic/Immunologic: Negative.    Neurological: Negative.    Hematological: Negative.    Psychiatric/Behavioral: Negative.         Objective   Physical Exam  Nursing note reviewed.   Constitutional:       Appearance: Normal  appearance.   Neurological:      Mental Status: She is alert.         Assessment/Plan   Problem List Items Addressed This Visit    None  Visit Diagnoses         Codes    Cervical spondylosis without myelopathy    -  Primary M47.812    Relevant Medications    celecoxib (CeleBREX) 100 mg capsule    Vertebrogenic low back pain     M54.51    Other chronic pain     G89.29    Thoracic arthritis     M47.814             I nice discussion with the patient today our plan will be as follows.    Radiology:   C-Spine Xray 2/22  RESULT: Alignment is within normal limits. No prevertebral soft  tissue swelling is noted. No loss vertebral body height is noted.  There is disc space narrowing endplate osteophyte formation most  pronounced involving the C5-6 level. Left neural foramina appear  unremarkable. Evaluation of right neural foramina is limited due to  positioning.      IMPRESSION:  Cervical spondylosis    Patient had previous thoracic x-rays completed which did show spondylosis and osteophyte formation at the T6-7 T7-8 levels.    Physically: I reiterated to the patient that she needs to take breaks throughout the day to do cervical range of motion and thoracic range of motion exercises.    Psychologically: No issues at this time.    Medication: I will move the patient to Celebrex 100 mg twice a day to see if it decreases some of the GI side effects and gives her of the option to take it twice a day.    Duration: Greater than 1 year.    Intervention:  Patient to have T6/7 and T7/8 MBB under fluoroscopic guidance and local anesthetic.  If the patient does achieve greater than 80% relief we will repeat this procedure again and then move towards radiofrequency ablation in the future.  Risks, benefit, and alternatives of the procedure were discussed with the patient.  Oswestry score has been compelted and recorded.      Matty Perez MD 04/02/24 8:25 AM

## 2024-04-02 NOTE — PROGRESS NOTES
Discharge Summary    Name: Kellen Shook  MRN: [id number]  : 1965  Date: 24    Discharge Summary: PT    Discharge Information: Date of discharge 23    Therapy Summary: Slowly progressing towards goals with good HEP compliance    Discharge Status: Patient had some min back pain yesterday - between her shoulder blades. Pain level 2/10 at the time of appointment.  - Per note on 23 in AE     Rehab Discharge Reason: Failed to schedule and/or keep follow-up appointment(s)

## 2024-04-11 ENCOUNTER — LAB (OUTPATIENT)
Dept: LAB | Facility: LAB | Age: 59
End: 2024-04-11
Payer: COMMERCIAL

## 2024-04-11 LAB — COTININE UR QL SCN: NEGATIVE

## 2024-04-26 ENCOUNTER — HOSPITAL ENCOUNTER (OUTPATIENT)
Dept: GASTROENTEROLOGY | Facility: HOSPITAL | Age: 59
Discharge: HOME | End: 2024-04-26
Payer: COMMERCIAL

## 2024-04-26 ENCOUNTER — HOSPITAL ENCOUNTER (OUTPATIENT)
Dept: RADIOLOGY | Facility: HOSPITAL | Age: 59
Discharge: HOME | End: 2024-04-26
Payer: COMMERCIAL

## 2024-04-26 VITALS
HEART RATE: 59 BPM | WEIGHT: 129 LBS | OXYGEN SATURATION: 99 % | SYSTOLIC BLOOD PRESSURE: 123 MMHG | BODY MASS INDEX: 22.02 KG/M2 | TEMPERATURE: 97 F | DIASTOLIC BLOOD PRESSURE: 80 MMHG | HEIGHT: 64 IN | RESPIRATION RATE: 17 BRPM

## 2024-04-26 DIAGNOSIS — M47.814 THORACIC ARTHRITIS: ICD-10-CM

## 2024-04-26 PROCEDURE — 64490 INJ PARAVERT F JNT C/T 1 LEV: CPT | Mod: 50 | Performed by: ANESTHESIOLOGY

## 2024-04-26 PROCEDURE — 2500000005 HC RX 250 GENERAL PHARMACY W/O HCPCS: Performed by: ANESTHESIOLOGY

## 2024-04-26 PROCEDURE — 64490 INJ PARAVERT F JNT C/T 1 LEV: CPT | Performed by: ANESTHESIOLOGY

## 2024-04-26 PROCEDURE — 64491 INJ PARAVERT F JNT C/T 2 LEV: CPT | Performed by: ANESTHESIOLOGY

## 2024-04-26 RX ORDER — BUPIVACAINE HYDROCHLORIDE 5 MG/ML
INJECTION, SOLUTION PERINEURAL AS NEEDED
Status: COMPLETED | OUTPATIENT
Start: 2024-04-26 | End: 2024-04-26

## 2024-04-26 RX ORDER — LIDOCAINE HYDROCHLORIDE 20 MG/ML
INJECTION, SOLUTION INFILTRATION; PERINEURAL AS NEEDED
Status: COMPLETED | OUTPATIENT
Start: 2024-04-26 | End: 2024-04-26

## 2024-04-26 RX ORDER — ESTRADIOL 10 UG/1
10 INSERT VAGINAL DAILY
COMMUNITY

## 2024-04-26 RX ADMIN — LIDOCAINE HYDROCHLORIDE 10 ML: 20 INJECTION, SOLUTION INFILTRATION; PERINEURAL at 14:09

## 2024-04-26 RX ADMIN — BUPIVACAINE HYDROCHLORIDE 2 ML: 5 INJECTION, SOLUTION PERINEURAL at 14:09

## 2024-04-26 ASSESSMENT — PAIN - FUNCTIONAL ASSESSMENT
PAIN_FUNCTIONAL_ASSESSMENT: 0-10
PAIN_FUNCTIONAL_ASSESSMENT: 0-10

## 2024-04-26 ASSESSMENT — PAIN SCALES - GENERAL
PAINLEVEL_OUTOF10: 4
PAINLEVEL_OUTOF10: 0 - NO PAIN
PAINLEVEL_OUTOF10: 4

## 2024-04-26 ASSESSMENT — PAIN DESCRIPTION - DESCRIPTORS: DESCRIPTORS: DULL;DISCOMFORT;ACHING

## 2024-04-26 NOTE — DISCHARGE INSTRUCTIONS
DISCHARGE INSTRUCTIONS FOR INJECTIONS     You underwent thoracic medial branch block today    Aftermost injections, it is recommended that you relax and limit your activity for the remainder of the day unless you have been told otherwise by your pain physician.  You should not drive a car, operate machinery, or make important legal decisions unless otherwise directed by your pain physician.  You may resume your normal activity, including exercise, tomorrow.      Keep a written pain diary of how much pain relief you experienced following the injection procedure and the length of time of pain relief you experienced pain relief. Following diagnostic injections like medial branch nerve blocks, sacroiliac joint blocks, stellate ganglion injections and other blocks, it is very important you record the specific amount of pain relief you experienced immediately after the injectionand how long it lasted. Your doctor will ask you for this information at your follow up visit.     For all injections, please keep the injection site dry and inspect the site for a couple of days. You may remove the Band-Aid the day of the injection at any time.     Some discomfort, bruising or slight swelling may occur at the injection site. This is not abnormal if it occurs.  If needed you may:    -Take over the counter medication such as Tylenol or Motrin.   -Apply an ice pack for 30 minutes, 2 to 3 times a day for the first 24 hours.     You may shower today; no soaking baths, hot tubs, whirlpools or swimming pools for two days.      If you are given steroids in your injection, it may take 3-5 days for the steroid medication to take effect. You may notice a worsening of your symptoms for 1-2 days after the injection. This is not abnormal.  You may use acetaminophen, ibuprofen, or prescription medication that your doctor may have prescribed for you if you need to do so.     A few common side effects of steroids include facial flushing, sweating,  restlessness, irritability,difficulty sleeping, increase in blood sugar, and increased blood pressure. If you have diabetes, please monitor your blood sugar at least once a day for at least 5 days. If you have poorly controlled high blood pressure, monitoryour blood pressure for at least 2 days and contact your primary care physician if these numbers are unusually high for you.      If you take aspirin or non-steroidal anti-inflammatory drugs (examples are Motrin, Advil, ibuprofen, Naprosyn, Voltaren, Relafen, etc.) you may restart these this evening, but stop taking it 3 days before your next appointment, unless instructed otherwiseby your physician.      You do not need to discontinue non-aspirin-containing pain medications prior to an injection (examples: Celebrex, tramadol, hydrocodone and acetaminophen).      If you take a blood thinning medication (Coumadin, Lovenox, Fragmin,Ticlid, Plavix, Pradaxa, etc.), please discuss this with your primary care physician/cardiologist and your pain physician. These medications MUST be discontinued before you can have an injection safely, without the risk of uncontrolled bleeding. If these medications are not discontinued for an appropriate period of time, you will not be able to receivean injection.      If you are taking Coumadin, please have your INR checked the morning of your procedure and bringthe result to your appointment unless otherwise instructed. If your INR is over 1.2, your injection may need to be rescheduled to avoid uncontrolled bleeding from the needle placement.     Call [facility] at [phone] between 8am-4pm Monday - Friday if you are experiencing the following:    If you received an epidural or spinal injection:    -Headache that doesnot go away with medicine, is worse when sitting or standing up, and is greatly relieved upon lying down.   -Severe pain worse than or different than your baseline pain.   -Chills or fever (101º F or  greater).   -Drainage or signs of infection at the injection site     Go directly to the Emergency Department if you are experiencing the following and received an epidural or spinal injection:   -Abrupt weakness or progressive weakness in your legs that starts after you leave the clinic.   -Abrupt severe or worsening numbness in your legs.   -Inability to urinate after the injection or loss of bowel or bladder control without the urge to defecate or urinate.     If you have a clinical question that cannot wait until your next appointment, please call [phone] between 8am-4pm Monday - Friday or send a [employer] message. We do our best to return all non-emergency messages within 24 hours, Monday - Friday. A nurse or physician will return your message.      If you need to cancel an appointment, please call the scheduling staff at [phone] during normal business hours or leave a message at least 24 hours in advance.     If you are going to be sedated for your next procedure, you MUST have responsible adult who can legally drive accompany you home. You cannot eat or drink for eight hours prior to the planned procedure if you are going to receive sedation. You may take your non-blood thinning medications with a small sip of water.

## 2024-04-26 NOTE — OP NOTE
Preprocedure diagnosis: Thoracic spondylosis  Postprocedure diagnosis thoracic spondylosis    Procedure performed: Bilateral T6-7 and T7-8 medial branch block under fluoroscopic guidance    Physician: Matty Perez MD    Anesthesia: Local    Complications: none    Blood loss:  none    Clinical note: This is a very pleasant 58-year-old female chronic thoracic pain who suffers with thoracic spondylosis here meeting all medical criteria for above-mentioned procedure.    Procedure:  The patient was identified in the preoperative area.  The procedure was discussed in detail including its risks, benefits, and alternatives.  Signed consent was obtained and the patient agreed to proceed.     The patient was brought to the [facility] procedure room and placed in the prone position onto the fluoroscopy table. The thoracic area was prepped and draped in the usual sterile fashion using Chloroprep and a fenestrated drape. The fluoroscope was brought into the field and a PA image was obtained to identify the T6, T7, T8 vertebral bodies. The skin overlying the proposed needle entry site at the level of T6/7 and T7/8 on the left and right were marked. No skin infiltration was performed. The skin and subcutaneous tissues overlying the proposed needle entry sites were anesthetized with 10 ml of 2% lidocaine.  A 22 spinal needle was placed through the skin and advanced coaxially towards the base of the transverse process where it joins the superior articular process until the needle tip touched bone. Correct final needle position was confirmed with AP fluoroscopic imaging. 2 ml of 0.5% bupivacaine  was injected in divided doses following negative aspiration for heme, CSF, or air targeting the medial branch nerves at the T6-7 and T7-8 levels.  After completion of the injection, the needles were removed. The injection sites were cleaned and dried. The patient was transferred to the recovery area and discharged in stable  condition per protocol.    The procedure was completed without complications and was tolerated well. The patient was monitored after the procedure. The patient (or responsible party) was given post-procedure and discharge instructions to follow at home. The patient was discharged in stable condition. A follow up appointment was made.

## 2024-05-06 DIAGNOSIS — M47.812 CERVICAL SPONDYLOSIS WITHOUT MYELOPATHY: ICD-10-CM

## 2024-05-06 RX ORDER — CELECOXIB 100 MG/1
100 CAPSULE ORAL 2 TIMES DAILY
Qty: 60 CAPSULE | Refills: 0 | Status: SHIPPED | OUTPATIENT
Start: 2024-05-06 | End: 2024-05-08 | Stop reason: SDUPTHER

## 2024-05-08 ENCOUNTER — OFFICE VISIT (OUTPATIENT)
Dept: PAIN MEDICINE | Facility: CLINIC | Age: 59
End: 2024-05-08
Payer: COMMERCIAL

## 2024-05-08 VITALS
OXYGEN SATURATION: 98 % | SYSTOLIC BLOOD PRESSURE: 126 MMHG | BODY MASS INDEX: 22.02 KG/M2 | DIASTOLIC BLOOD PRESSURE: 70 MMHG | WEIGHT: 129 LBS | HEART RATE: 68 BPM | HEIGHT: 64 IN

## 2024-05-08 DIAGNOSIS — M47.812 CERVICAL SPONDYLOSIS WITHOUT MYELOPATHY: ICD-10-CM

## 2024-05-08 PROCEDURE — 3074F SYST BP LT 130 MM HG: CPT | Performed by: NURSE PRACTITIONER

## 2024-05-08 PROCEDURE — 3078F DIAST BP <80 MM HG: CPT | Performed by: NURSE PRACTITIONER

## 2024-05-08 PROCEDURE — 99214 OFFICE O/P EST MOD 30 MIN: CPT | Performed by: NURSE PRACTITIONER

## 2024-05-08 PROCEDURE — 1036F TOBACCO NON-USER: CPT | Performed by: NURSE PRACTITIONER

## 2024-05-08 RX ORDER — CELECOXIB 200 MG/1
200 CAPSULE ORAL 2 TIMES DAILY
Qty: 60 CAPSULE | Refills: 2 | Status: SHIPPED | OUTPATIENT
Start: 2024-05-08 | End: 2024-06-07

## 2024-05-08 ASSESSMENT — PAIN DESCRIPTION - DESCRIPTORS: DESCRIPTORS: ACHING

## 2024-05-08 ASSESSMENT — PAIN - FUNCTIONAL ASSESSMENT: PAIN_FUNCTIONAL_ASSESSMENT: 0-10

## 2024-05-08 ASSESSMENT — PAIN SCALES - GENERAL
PAINLEVEL: 3
PAINLEVEL_OUTOF10: 3

## 2024-05-08 NOTE — PROGRESS NOTES
Subjective   Patient ID: Kellen Shook is a 58 y.o. female who presents for No chief complaint on file..    HPI 58-year-old female with past medical history significant for PARISH, HTN, BPPV, chronic hip pain, fibromyalgia, GERD, HLD, lumbar discogenic pain syndrome, CHAY on CPAP, osteoarthritis of multiple joints, Parkinson's disease, RLS, spondylosis of the lumbosacral region without myelopathy, thoracic radiculopathy, degenerative disc disease of the lumbar spine and spondylosis of the thoracic spine with radiculopathy.  She presents for a follow-up to her bilateral T6-7 and T7-8 medial branch block under fluoroscopic guidance with Dr. Matty Perez on 4/26/2024. She reports 100% decrease in pain for 3 hours post-procedure.  She is eager to proceed with her second diagnostic medial branch block in anticipation of RFA.    Review of Systems Unless noted in the HPI all other systems have been reviewed and are negative for complaint.    Objective   Physical Exam  General- No acute distress, well appearing and well nourished.   Eyes Conjunctiva and lids: No erythema, swelling or discharge  Neck - Supple, no cervical lymphadenopathy.   Pulmonary - Respiratory effort: Normal respiration.   Cardiovascular - Normal rate and rhythm..poc  Examination of extremities for edema and/or varicosities: No peripheral edema  Abdomen: Soft, Non-tender, non-distended, no abdominal masses.   Musculoskeletal - Decreased ROM to the cervical and lumbar spines. Pain with movement. TTP to both neck and low back.   Skin - Skin and subcutaneous tissue: Normal without rashes or lesions.  Neurologic - Antalgic gait   Psychiatric - Orientation to person, place, and time: Normal. Mood and affect: Normal.    Assessment/Plan   Problem List Items Addressed This Visit    None  Visit Diagnoses       Cervical spondylosis without myelopathy        Relevant Medications    celecoxib (CeleBREX) 200 mg capsule          TREATMENT PLAN:  I had a nice discussion  with the patient today and our plan will be as follows:  Radiology: No new imaging to review at this time.   Physically: Encouraged patient to continue with increased physical activity as able.   Psychologically: No acute psychological needs at this time.    Medication: Nothing at this time.  Duration: Nothing at this time.  Intervention: Patient is s/p bilateral T6-7 and T7-8 medial branch block under fluoroscopic guidance with Dr. Matty Perez on 4/26/2024. She reports 100% decrease in pain for 3 hours post-procedure.  She is eager to proceed with her second diagnostic medial branch block in anticipation of RFA.

## 2024-05-22 ENCOUNTER — ANCILLARY PROCEDURE (OUTPATIENT)
Dept: RADIOLOGY | Facility: EXTERNAL LOCATION | Age: 59
End: 2024-05-22
Payer: COMMERCIAL

## 2024-05-22 DIAGNOSIS — M47.814 SPONDYLOSIS WITHOUT MYELOPATHY OR RADICULOPATHY, THORACIC REGION: ICD-10-CM

## 2024-05-22 PROCEDURE — 64490 INJ PARAVERT F JNT C/T 1 LEV: CPT | Performed by: ANESTHESIOLOGY

## 2024-05-22 PROCEDURE — 64491 INJ PARAVERT F JNT C/T 2 LEV: CPT | Performed by: ANESTHESIOLOGY

## 2024-05-22 NOTE — PROGRESS NOTES
Preprocedure diagnosis: Thoracic spondylosis  Postprocedure diagnosis thoracic spondylosis    Procedure performed: Bilateral T6-7 and T7-8 medial branch block under fluoroscopic guidance    Physician: Matty Perez MD    Anesthesia: Local    Complications: none    Blood loss:  none    Clinical note: This is a very pleasant 58-year-old female who suffers with chronic thoracic pain here meeting all medical criteria for above-mentioned procedure.    Procedure:  The patient was identified in the preoperative area.  The procedure was discussed in detail including its risks, benefits, and alternatives.  Signed consent was obtained and the patient agreed to proceed.     The patient was brought to the [facility] procedure room and placed in the prone position onto the fluoroscopy table. The thoracic area was prepped and draped in the usual sterile fashion using Chloroprep and a fenestrated drape. The fluoroscope was brought into the field and a PA image was obtained to identify the T6, T7, T8 vertebral bodies. The skin overlying the proposed needle entry site at the level of T6/7 and T7/8 on the left and right were marked. No skin infiltration was performed. The skin and subcutaneous tissues overlying the proposed needle entry sites were anesthetized with 10 ml of 2% lidocaine.  A 22 spinal needle was placed through the skin and advanced coaxially towards the base of the transverse process where it joins the superior articular process until the needle tip touched bone. Correct final needle position was confirmed with AP fluoroscopic imaging. 2 ml of 0.5% bupivacaine  was injected in divided doses following negative aspiration for heme, CSF, or air. After completion of the injection, the needles were removed. The injection sites were cleaned and dried. The patient was transferred to the recovery area and discharged in stable condition per protocol.    The procedure was completed without complications and was  tolerated well. The patient was monitored after the procedure. The patient (or responsible party) was given post-procedure and discharge instructions to follow at home. The patient was discharged in stable condition. A follow up appointment was made.

## 2024-05-23 ENCOUNTER — OFFICE VISIT (OUTPATIENT)
Dept: OBSTETRICS AND GYNECOLOGY | Facility: CLINIC | Age: 59
End: 2024-05-23
Payer: COMMERCIAL

## 2024-05-23 VITALS
WEIGHT: 133 LBS | BODY MASS INDEX: 22.71 KG/M2 | DIASTOLIC BLOOD PRESSURE: 72 MMHG | SYSTOLIC BLOOD PRESSURE: 124 MMHG | HEIGHT: 64 IN

## 2024-05-23 DIAGNOSIS — N94.10 DYSPAREUNIA IN FEMALE: ICD-10-CM

## 2024-05-23 DIAGNOSIS — N89.8 VAGINAL PRURITUS: ICD-10-CM

## 2024-05-23 DIAGNOSIS — N95.2 POSTMENOPAUSAL ATROPHIC VAGINITIS: ICD-10-CM

## 2024-05-23 DIAGNOSIS — Z01.411 ENCOUNTER FOR GYNECOLOGICAL EXAMINATION (GENERAL) (ROUTINE) WITH ABNORMAL FINDINGS: Primary | ICD-10-CM

## 2024-05-23 PROCEDURE — 99212 OFFICE O/P EST SF 10 MIN: CPT | Performed by: MIDWIFE

## 2024-05-23 PROCEDURE — 1036F TOBACCO NON-USER: CPT | Performed by: MIDWIFE

## 2024-05-23 PROCEDURE — 99396 PREV VISIT EST AGE 40-64: CPT | Performed by: MIDWIFE

## 2024-05-23 PROCEDURE — 3074F SYST BP LT 130 MM HG: CPT | Performed by: MIDWIFE

## 2024-05-23 PROCEDURE — 3078F DIAST BP <80 MM HG: CPT | Performed by: MIDWIFE

## 2024-05-23 RX ORDER — ESTRADIOL 10 UG/1
10 INSERT VAGINAL 2 TIMES WEEKLY
Qty: 18 TABLET | Refills: 3 | Status: SHIPPED | OUTPATIENT
Start: 2024-05-23 | End: 2025-05-23

## 2024-05-23 RX ORDER — TRIAMCINOLONE ACETONIDE 0.25 MG/G
CREAM TOPICAL
Qty: 15 G | Refills: 3 | Status: SHIPPED | OUTPATIENT
Start: 2024-05-23

## 2024-05-23 NOTE — PROGRESS NOTES
Subjective   Patient ID: Kellen Shook is a 58 y.o. female  who presents for AE and to get Rx for YuvaFem that has been helping her with vaginal dryness and dyspareunia d/t atrophy and Rx for Kenalog cream that she uses as needed for vulvar itch.  No chief complaint on file..  HPI  PMHx: last pap  NIL Neg; mammogram  Neg; h/o uterine fibroids; BTL  SocHx:  42 yrs SA  ROS: no pelvic pain, no urinary c/o, NAD  Review of Systems    Objective   Physical Exam  Vitals and nursing note reviewed.   Constitutional:       Appearance: Normal appearance.   HENT:      Nose: Nose normal.   Eyes:      Pupils: Pupils are equal, round, and reactive to light.   Neck:      Thyroid: No thyroid mass.   Cardiovascular:      Rate and Rhythm: Normal rate and regular rhythm.   Pulmonary:      Effort: Pulmonary effort is normal.      Breath sounds: Normal breath sounds.   Chest:      Chest wall: No mass.   Breasts:     Right: Normal.      Left: Normal.   Abdominal:      Palpations: Abdomen is soft. There is no mass.      Tenderness: There is no abdominal tenderness.   Genitourinary:     General: Normal vulva.      Labia:         Right: No rash, tenderness or lesion.         Left: No rash, tenderness or lesion.       Vagina: Normal.      Cervix: Normal.      Uterus: Normal.       Adnexa: Right adnexa normal and left adnexa normal.      Comments: Vulvovaginal atrophic changes  Musculoskeletal:         General: Normal range of motion.   Lymphadenopathy:      Cervical: No cervical adenopathy.      Lower Body: No right inguinal adenopathy. No left inguinal adenopathy.   Skin:     General: Skin is warm and dry.   Neurological:      Mental Status: She is alert and oriented to person, place, and time.      Motor: Motor function is intact.      Gait: Gait is intact.   Psychiatric:         Mood and Affect: Mood normal.         Assessment/Plan   Diagnoses and all orders for this visit:  Encounter for gynecological examination  (general) (routine) with abnormal findings  Postmenopausal atrophic vaginitis  -     estradiol (Vagifem) 10 mcg tablet vaginal tablet; Insert 1 tablet (10 mcg) into the vagina 2 times a week. Insert 1 tablet into vagina at bedtime for 2 weeks, then at bedtime twice a week.  Vaginal pruritus  -     triamcinolone (Kenalog) 0.025 % cream; Apply small amount of cream to affected area as needed    Reassurance given  Vulvar skin care discussed  RTO AE/PRN       AL Ojeda, ND 05/23/24 10:50 AM

## 2024-06-03 DIAGNOSIS — N32.81 OVERACTIVE BLADDER: ICD-10-CM

## 2024-06-03 RX ORDER — OXYBUTYNIN CHLORIDE 10 MG/1
10 TABLET, EXTENDED RELEASE ORAL DAILY
Qty: 90 TABLET | Refills: 1 | Status: SHIPPED | OUTPATIENT
Start: 2024-06-03

## 2024-06-05 ENCOUNTER — TELEMEDICINE (OUTPATIENT)
Dept: PAIN MEDICINE | Facility: CLINIC | Age: 59
End: 2024-06-05
Payer: COMMERCIAL

## 2024-06-05 ENCOUNTER — APPOINTMENT (OUTPATIENT)
Dept: PAIN MEDICINE | Facility: CLINIC | Age: 59
End: 2024-06-05
Payer: COMMERCIAL

## 2024-06-05 VITALS — HEIGHT: 64 IN | BODY MASS INDEX: 22.71 KG/M2 | WEIGHT: 133 LBS

## 2024-06-05 DIAGNOSIS — M47.814 SPONDYLOSIS WITHOUT MYELOPATHY OR RADICULOPATHY, THORACIC REGION: Primary | ICD-10-CM

## 2024-06-05 PROCEDURE — 99213 OFFICE O/P EST LOW 20 MIN: CPT | Performed by: NURSE PRACTITIONER

## 2024-06-05 PROCEDURE — 1036F TOBACCO NON-USER: CPT | Performed by: NURSE PRACTITIONER

## 2024-06-05 RX ORDER — LIDOCAINE 50 MG/G
1 PATCH TOPICAL DAILY
COMMUNITY

## 2024-06-05 ASSESSMENT — PAIN SCALES - GENERAL
PAINLEVEL: 3
PAINLEVEL_OUTOF10: 3

## 2024-06-05 ASSESSMENT — PAIN DESCRIPTION - DESCRIPTORS: DESCRIPTORS: ACHING;DULL;THROBBING

## 2024-06-05 ASSESSMENT — PAIN - FUNCTIONAL ASSESSMENT: PAIN_FUNCTIONAL_ASSESSMENT: 0-10

## 2024-06-05 NOTE — PROGRESS NOTES
Subjective   Patient ID: Kellen Shook is a 58 y.o. female who presents for Follow-up (Post TMBB. Lot of relief 100% in the beginning).    Virtual or Telephone Consent    An interactive audio and video telecommunication system which permits real time communications between the patient (at the originating site) and provider (at the distant site) was utilized to provide this telehealth service.     Verbal consent was requested and obtained from Kellen Shook on this date, 06/05/24 for a telehealth visit.     HPI 58-year-old female with past medical history significant for PARISH, HTN, BPPV, chronic hip pain, fibromyalgia, GERD, HLD, lumbar discogenic pain syndrome, CHAY on CPAP, osteoarthritis of multiple joints, Parkinson's disease, RLS, spondylosis of the lumbosacral region without myelopathy, thoracic radiculopathy, degenerative disc disease of the lumbar spine and spondylosis of the thoracic spine with radiculopathy.      She presents for a follow-up to her second bilateral T6-7 and T7-8 medial branch block under fluoroscopic guidance with Dr. Matty Perez on 5/22/2024. She reports 100% decrease in pain for 2-3 hours post-procedure.  She is eager to proceed with radiofrequency ablation.      Review of Systems Unless noted in the HPI all other systems have been reviewed and are negative for complaint.    Objective   Physical Exam Telehealth Visit  General- No acute distress, well appearing and well nourished.   Eyes Conjunctiva and lids: No erythema, swelling or discharge  Pulmonary - Respiratory effort: Normal respiration.   Neurologic - Coordination: Normal. A&Ox3.  Psychiatric - Orientation to person, place, and time: Normal. Mood and affect: Normal.    Assessment/Plan   Problem List Items Addressed This Visit    None  Visit Diagnoses       Spondylosis without myelopathy or radiculopathy, thoracic region    -  Primary          TREATMENT PLAN:  I had a nice discussion with the patient today and our plan will be as  follows:  Radiology: No new imaging to review at this time.   Physically: Encouraged patient to continue with increased physical activity as able.   Psychologically: No acute psychological needs at this time.    Medication: Nothing at this time.  Duration: Nothing at this time.  Intervention: Patient is s/p bilateral T6-7 and T7-8 medial branch block under fluoroscopic guidance with Dr. Matty Perez on 5/22/2024. She reports 100% decrease in pain for 2-3 hours post-procedure.  She is eager to proceed radiofrequency ablation with Dr. Perez.

## 2024-06-07 ENCOUNTER — TELEPHONE (OUTPATIENT)
Dept: PAIN MEDICINE | Facility: CLINIC | Age: 59
End: 2024-06-07
Payer: COMMERCIAL

## 2024-06-12 ENCOUNTER — APPOINTMENT (OUTPATIENT)
Dept: PAIN MEDICINE | Facility: CLINIC | Age: 59
End: 2024-06-12
Payer: COMMERCIAL

## 2024-07-12 ENCOUNTER — HOSPITAL ENCOUNTER (OUTPATIENT)
Dept: GASTROENTEROLOGY | Facility: HOSPITAL | Age: 59
Discharge: HOME | End: 2024-07-12
Payer: COMMERCIAL

## 2024-07-12 VITALS
HEIGHT: 64 IN | SYSTOLIC BLOOD PRESSURE: 114 MMHG | TEMPERATURE: 96.8 F | HEART RATE: 70 BPM | DIASTOLIC BLOOD PRESSURE: 70 MMHG | RESPIRATION RATE: 17 BRPM | OXYGEN SATURATION: 99 % | WEIGHT: 130 LBS | BODY MASS INDEX: 22.2 KG/M2

## 2024-07-12 DIAGNOSIS — M47.814 SPONDYLOSIS WITHOUT MYELOPATHY OR RADICULOPATHY, THORACIC REGION: ICD-10-CM

## 2024-07-12 DIAGNOSIS — M47.814 THORACIC SPONDYLOSIS WITHOUT MYELOPATHY: ICD-10-CM

## 2024-07-12 PROCEDURE — 2500000004 HC RX 250 GENERAL PHARMACY W/ HCPCS (ALT 636 FOR OP/ED): Performed by: ANESTHESIOLOGY

## 2024-07-12 PROCEDURE — 2500000005 HC RX 250 GENERAL PHARMACY W/O HCPCS: Performed by: ANESTHESIOLOGY

## 2024-07-12 RX ORDER — BUPIVACAINE HYDROCHLORIDE 5 MG/ML
INJECTION, SOLUTION PERINEURAL AS NEEDED
Status: COMPLETED | OUTPATIENT
Start: 2024-07-12 | End: 2024-07-12

## 2024-07-12 RX ORDER — LIDOCAINE HYDROCHLORIDE 20 MG/ML
INJECTION, SOLUTION EPIDURAL; INFILTRATION; INTRACAUDAL; PERINEURAL AS NEEDED
Status: COMPLETED | OUTPATIENT
Start: 2024-07-12 | End: 2024-07-12

## 2024-07-12 RX ORDER — TRIAMCINOLONE ACETONIDE 40 MG/ML
INJECTION, SUSPENSION INTRA-ARTICULAR; INTRAMUSCULAR AS NEEDED
Status: COMPLETED | OUTPATIENT
Start: 2024-07-12 | End: 2024-07-12

## 2024-07-12 ASSESSMENT — ENCOUNTER SYMPTOMS
ABDOMINAL PAIN: 0
MYALGIAS: 1
NAUSEA: 0
ABDOMINAL DISTENTION: 0
COUGH: 0
CONFUSION: 0
LIGHT-HEADEDNESS: 0
TROUBLE SWALLOWING: 0
UNEXPECTED WEIGHT CHANGE: 0
SPEECH DIFFICULTY: 0
WHEEZING: 0
DIZZINESS: 0
COLOR CHANGE: 0
JOINT SWELLING: 0
DIARRHEA: 0
HEADACHES: 0
BACK PAIN: 1
FEVER: 0
CHILLS: 0
SHORTNESS OF BREATH: 0
ARTHRALGIAS: 1
DIFFICULTY URINATING: 0
VOMITING: 0
CONSTIPATION: 0
SLEEP DISTURBANCE: 0

## 2024-07-12 ASSESSMENT — PAIN DESCRIPTION - DESCRIPTORS
DESCRIPTORS: DULL;ACHING
DESCRIPTORS: ACHING

## 2024-07-12 ASSESSMENT — PAIN - FUNCTIONAL ASSESSMENT
PAIN_FUNCTIONAL_ASSESSMENT: 0-10
PAIN_FUNCTIONAL_ASSESSMENT: 0-10

## 2024-07-12 ASSESSMENT — PAIN SCALES - GENERAL
PAINLEVEL_OUTOF10: 3
PAINLEVEL_OUTOF10: 1

## 2024-07-12 NOTE — DISCHARGE INSTRUCTIONS
DISCHARGE INSTRUCTIONS FOR INJECTIONS     You underwent thoracic radiofrequency ablation today    Aftermost injections, it is recommended that you relax and limit your activity for the remainder of the day unless you have been told otherwise by your pain physician.  You should not drive a car, operate machinery, or make important legal decisions unless otherwise directed by your pain physician.  You may resume your normal activity, including exercise, tomorrow.      Keep a written pain diary of how much pain relief you experienced following the injection procedure and the length of time of pain relief you experienced pain relief. Following diagnostic injections like medial branch nerve blocks, sacroiliac joint blocks, stellate ganglion injections and other blocks, it is very important you record the specific amount of pain relief you experienced immediately after the injectionand how long it lasted. Your doctor will ask you for this information at your follow up visit.     For all injections, please keep the injection site dry and inspect the site for a couple of days. You may remove the Band-Aid the day of the injection at any time.     Some discomfort, bruising or slight swelling may occur at the injection site. This is not abnormal if it occurs.  If needed you may:    -Take over the counter medication such as Tylenol or Motrin.   -Apply an ice pack for 30 minutes, 2 to 3 times a day for the first 24 hours.     You may shower today; no soaking baths, hot tubs, whirlpools or swimming pools for two days.      If you are given steroids in your injection, it may take 3-5 days for the steroid medication to take effect. You may notice a worsening of your symptoms for 1-2 days after the injection. This is not abnormal.  You may use acetaminophen, ibuprofen, or prescription medication that your doctor may have prescribed for you if you need to do so.     A few common side effects of steroids include facial flushing,  sweating, restlessness, irritability,difficulty sleeping, increase in blood sugar, and increased blood pressure. If you have diabetes, please monitor your blood sugar at least once a day for at least 5 days. If you have poorly controlled high blood pressure, monitoryour blood pressure for at least 2 days and contact your primary care physician if these numbers are unusually high for you.      If you take aspirin or non-steroidal anti-inflammatory drugs (examples are Motrin, Advil, ibuprofen, Naprosyn, Voltaren, Relafen, etc.) you may restart these this evening, but stop taking it 3 days before your next appointment, unless instructed otherwiseby your physician.      You do not need to discontinue non-aspirin-containing pain medications prior to an injection (examples: Celebrex, tramadol, hydrocodone and acetaminophen).      If you take a blood thinning medication (Coumadin, Lovenox, Fragmin,Ticlid, Plavix, Pradaxa, etc.), please discuss this with your primary care physician/cardiologist and your pain physician. These medications MUST be discontinued before you can have an injection safely, without the risk of uncontrolled bleeding. If these medications are not discontinued for an appropriate period of time, you will not be able to receivean injection.      If you are taking Coumadin, please have your INR checked the morning of your procedure and bringthe result to your appointment unless otherwise instructed. If your INR is over 1.2, your injection may need to be rescheduled to avoid uncontrolled bleeding from the needle placement.     Call [facility] at [phone] between 8am-4pm Monday - Friday if you are experiencing the following:    If you received an epidural or spinal injection:    -Headache that doesnot go away with medicine, is worse when sitting or standing up, and is greatly relieved upon lying down.   -Severe pain worse than or different than your baseline pain.   -Chills or fever  (101º F or greater).   -Drainage or signs of infection at the injection site     Go directly to the Emergency Department if you are experiencing the following and received an epidural or spinal injection:   -Abrupt weakness or progressive weakness in your legs that starts after you leave the clinic.   -Abrupt severe or worsening numbness in your legs.   -Inability to urinate after the injection or loss of bowel or bladder control without the urge to defecate or urinate.     If you have a clinical question that cannot wait until your next appointment, please call [phone] between 8am-4pm Monday - Friday or send a [employer] message. We do our best to return all non-emergency messages within 24 hours, Monday - Friday. A nurse or physician will return your message.      If you need to cancel an appointment, please call the scheduling staff at [phone] during normal business hours or leave a message at least 24 hours in advance.     If you are going to be sedated for your next procedure, you MUST have responsible adult who can legally drive accompany you home. You cannot eat or drink for eight hours prior to the planned procedure if you are going to receive sedation. You may take your non-blood thinning medications with a small sip of water.

## 2024-07-12 NOTE — H&P
History Of Present Illness  Kellen Shook is a 58 y.o. female presenting with chronic thoracic pain here today for ablation..     Past Medical History  Past Medical History:   Diagnosis Date    Acute cystitis with hematuria 01/02/2020    Acute cystitis with hematuria    Allergic rhinitis     Anesthesia of skin 10/28/2019    Numbness of fingers    Arthritis     Body mass index (BMI) 31.0-31.9, adult 01/12/2021    BMI 31.0-31.9,adult    Body mass index (BMI) 33.0-33.9, adult 02/24/2021    BMI 33.0-33.9,adult    Body mass index (BMI) 35.0-35.9, adult 03/16/2019    BMI 35.0-35.9,adult    Body mass index (BMI) 35.0-35.9, adult 08/24/2021    BMI 35.0-35.9,adult    Body mass index (BMI) 36.0-36.9, adult 02/24/2022    BMI 36.0-36.9,adult    Carpal tunnel syndrome, right upper limb 05/21/2020    Carpal tunnel syndrome of right wrist    Chronic pain disorder     Arthritis    Cognitive disorder 2020    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    CPAP (continuous positive airway pressure) dependence     Cutaneous abscess of face 12/26/2014    Facial abscess    Diverticulosis 2018    Seen with first colonoscopy    Dizziness 2023    Occasionally    Encounter for immunization 06/15/2020    Influenza vaccine administered    Encounter for screening for malignant neoplasm of cervix 07/21/2020    Encounter for screening for malignant neoplasm of cervix    Encounter for screening for malignant neoplasm of colon 06/22/2019    Colon cancer screening    Encounter for screening for malignant neoplasm of rectum 06/22/2019    Screening for rectal cancer    Fibroid     Fibromyalgia, primary     H/O chronic eczema     History of decompression of median nerve 02/21/2024    Hyperlipidemia     Hypertension     Joint pain     Hip and back    Localized swelling, mass and lump, right upper limb 05/21/2020    Mass of finger of right hand    Low back pain, unspecified 11/18/2019    Acute low back pain    Lumbar disc disease     Degenerative  disc  disease    Medial epicondylitis, unspecified elbow 10/20/2020    Epicondylitis elbow, medial    Neuromuscular disorder (Multi)     Parkinsons    Obesity     Osteoarthritis     Other abnormal and inconclusive findings on diagnostic imaging of breast 02/18/2016    Abnormal mammogram of left breast    Other abnormal findings in urine     Leukocytes in urine    Other acute sinusitis 03/16/2019    Other acute sinusitis, recurrence not specified    Other amnesia 12/16/2020    Memory disorder    Other conditions influencing health status 04/18/2013    Nephrolithiasis    Other conditions influencing health status 01/28/2016    History of dyspareunia    Other microscopic hematuria 03/17/2018    Hematuria, microscopic    Other specified disorders of breast 02/24/2021    Breast asymmetry    Other specified postprocedural states 02/27/2020    History of carpal tunnel surgery    Pain in unspecified elbow 10/20/2020    Elbow pain    Parkinson disease (Multi)     Parkinson disease (Multi)     Peripheral neuropathy     Personal history of diseases of the skin and subcutaneous tissue 11/15/2017    History of dermatitis    Personal history of other diseases of the female genital tract 06/22/2019    History of postmenopausal bleeding    Personal history of other diseases of the musculoskeletal system and connective tissue 09/11/2019    History of spinal stenosis    Personal history of other diseases of the musculoskeletal system and connective tissue 01/27/2016    History of joint pain    Personal history of other diseases of the nervous system and sense organs 11/20/2020    History of Parkinson's disease    Personal history of other endocrine, nutritional and metabolic disease 11/20/2020    History of familial combined hyperlipidemia    Personal history of other specified conditions 08/23/2021    History of breast lump    Personal history of other specified conditions 01/02/2020    History of urinary frequency    Personal history of  other specified conditions 09/14/2020    History of chest pain    Polyp of colon     Hyperplastic colon polyp    Restless leg     Rosacea     Skin disorder     Eczema    Sleep apnea, obstructive     Snoring     Trigger finger, right ring finger 05/21/2020    Trigger ring finger of right hand    Trochanteric bursitis, left hip 04/25/2016    Trochanteric bursitis of left hip    Vertigo 2023    Happens occasionally       Surgical History  Past Surgical History:   Procedure Laterality Date    CARPAL TUNNEL RELEASE  02/17/2020    COLONOSCOPY  03/17/2018    Complete Colonoscopy    EPIDURAL BLOCK INJECTION      EXPLORATORY LAPAROTOMY  11/1/2002    For adhedsions from  tubal ligation    OTHER SURGICAL HISTORY  07/18/2013    Lysis Of Peritoneal Adhesions Laparoscopic    OTHER SURGICAL HISTORY  07/21/2020    Endoscopy    TRIGGER FINGER RELEASE  2/17/2020    TUBAL LIGATION  03/01/2021    Tubal Ligation        Social History  She reports that she has never smoked. She has never used smokeless tobacco. She reports current alcohol use. She reports that she does not use drugs.    Family History  Family History   Problem Relation Name Age of Onset    Diabetes type II Mother Angelika Ragland     Hypertension Mother Angelika Ragland     Cancer Mother Angelika Ragland     Hearing loss Mother Angelika Ragland     Hyperlipidemia Mother Angelika Ragland     Heart attack Mother Angelika Ragland     Arthritis Mother Angelika Ragland     Alcohol abuse Father Marty Ragland     Cancer Father Marty Ragland     Arthritis Sister Marietta Ilana     Hypertension Sister Madisyn Bob     No Known Problems Sister      Hypertension Brother Elia Obie     Heart attack Brother Elia Ragland     No Known Problems Brother      No Known Problems Brother      No Known Problems Brother      No Known Problems Son      No Known Problems Son      No Known Problems Son      Breast cancer Cousin  65        Allergies  Statins-hmg-coa reductase inhibitors and Amoxicillin    Review of Systems    Constitutional:  Negative for chills, fever and unexpected weight change.   HENT:  Negative for congestion and trouble swallowing.    Respiratory:  Negative for cough, shortness of breath and wheezing.    Cardiovascular:  Negative for chest pain.   Gastrointestinal:  Negative for abdominal distention, abdominal pain, constipation, diarrhea, nausea and vomiting.   Genitourinary:  Negative for difficulty urinating.   Musculoskeletal:  Positive for arthralgias, back pain and myalgias. Negative for joint swelling.   Skin:  Negative for color change.   Neurological:  Negative for dizziness, speech difficulty, light-headedness and headaches.   Psychiatric/Behavioral:  Negative for confusion and sleep disturbance.         Physical Exam  Constitutional:       General: She is awake.      Appearance: Normal appearance.   HENT:      Head: Normocephalic and atraumatic.      Nose: Nose normal.      Mouth/Throat:      Mouth: Mucous membranes are moist.   Eyes:      Pupils: Pupils are equal, round, and reactive to light.   Neck:      Thyroid: No thyroid mass.      Trachea: Phonation normal.   Cardiovascular:      Rate and Rhythm: Normal rate and regular rhythm.      Heart sounds: Normal heart sounds. No murmur heard.     No gallop.   Pulmonary:      Effort: Pulmonary effort is normal. No respiratory distress.      Breath sounds: Normal air entry. No decreased breath sounds, wheezing, rhonchi or rales.   Abdominal:      General: Bowel sounds are normal. There is no distension.      Palpations: Abdomen is soft.      Tenderness: There is no abdominal tenderness.   Musculoskeletal:      Cervical back: Neck supple.      Thoracic back: Tenderness present. Decreased range of motion.      Right lower leg: No edema.      Left lower leg: No edema.   Skin:     General: Skin is warm.      Capillary Refill: Capillary refill takes less than 2 seconds.   Neurological:      General: No focal deficit present.      Mental Status: She is alert and  "oriented to person, place, and time. Mental status is at baseline.      Cranial Nerves: Cranial nerves 2-12 are intact.      Motor: Motor function is intact.   Psychiatric:         Attention and Perception: Attention and perception normal.         Mood and Affect: Mood normal.         Speech: Speech normal.         Behavior: Behavior normal.          Last Recorded Vitals  Blood pressure 118/70, pulse 67, temperature 36 °C (96.8 °F), temperature source Temporal, resp. rate 16, height 1.626 m (5' 4\"), weight 59 kg (130 lb), SpO2 99%.    Relevant Results             Assessment/Plan   Active Problems:  There are no active Hospital Problems.      Patient here today for T6-7 and T7-8 thoracic radiofrequency ablation under fluoroscopic guidance  Risks, benefit, and alternatives of the procedure were discussed with the patient.  Oswestry score has been compelted and recorded.       I spent 5 minutes in the professional and overall care of this patient.      Matty Perez MD    "

## 2024-07-25 ENCOUNTER — APPOINTMENT (OUTPATIENT)
Dept: OPHTHALMOLOGY | Facility: CLINIC | Age: 59
End: 2024-07-25
Payer: COMMERCIAL

## 2024-07-25 DIAGNOSIS — H01.02A SQUAMOUS BLEPHARITIS OF UPPER AND LOWER EYELIDS OF BOTH EYES: ICD-10-CM

## 2024-07-25 DIAGNOSIS — H18.523 CORNEAL EPITHELIAL BASEMENT MEMBRANE DYSTROPHY OF BOTH EYES: ICD-10-CM

## 2024-07-25 DIAGNOSIS — H01.02B SQUAMOUS BLEPHARITIS OF UPPER AND LOWER EYELIDS OF BOTH EYES: ICD-10-CM

## 2024-07-25 DIAGNOSIS — H02.889 MEIBOMIAN GLAND DYSFUNCTION: ICD-10-CM

## 2024-07-25 DIAGNOSIS — H00.025 HORDEOLUM INTERNUM OF LEFT LOWER EYELID: Primary | ICD-10-CM

## 2024-07-25 DIAGNOSIS — H10.13 ALLERGIC CONJUNCTIVITIS OF BOTH EYES: ICD-10-CM

## 2024-07-25 PROCEDURE — 92002 INTRM OPH EXAM NEW PATIENT: CPT | Performed by: STUDENT IN AN ORGANIZED HEALTH CARE EDUCATION/TRAINING PROGRAM

## 2024-07-25 RX ORDER — NEOMYCIN SULFATE, POLYMYXIN B SULFATE, AND DEXAMETHASONE 3.5; 10000; 1 MG/G; [USP'U]/G; MG/G
OINTMENT OPHTHALMIC
Qty: 3.5 G | Refills: 1 | Status: SHIPPED | OUTPATIENT
Start: 2024-07-25

## 2024-07-25 ASSESSMENT — REFRACTION_WEARINGRX
OS_ADD: +2.50
OS_SPHERE: +2.25
OD_AXIS: 163
OD_ADD: +2.50
OS_CYLINDER: +1.50
OD_CYLINDER: +1.50
OS_AXIS: 160
OD_SPHERE: -0.50

## 2024-07-25 ASSESSMENT — CONF VISUAL FIELD
METHOD: COUNTING FINGERS
OS_INFERIOR_NASAL_RESTRICTION: 0
OS_INFERIOR_TEMPORAL_RESTRICTION: 0
OD_INFERIOR_TEMPORAL_RESTRICTION: 0
OD_SUPERIOR_NASAL_RESTRICTION: 0
OD_NORMAL: 1
OS_NORMAL: 1
OS_SUPERIOR_NASAL_RESTRICTION: 0
OS_SUPERIOR_TEMPORAL_RESTRICTION: 0
OD_SUPERIOR_TEMPORAL_RESTRICTION: 0
OD_INFERIOR_NASAL_RESTRICTION: 0

## 2024-07-25 ASSESSMENT — VISUAL ACUITY
OS_CC: 20/25
CORRECTION_TYPE: GLASSES
OD_CC: 20/20
OS_CC+: -1
OD_CC+: -2
METHOD: SNELLEN - LINEAR

## 2024-07-25 ASSESSMENT — REFRACTION_MANIFEST
OD_CYLINDER: +1.00
OS_CYLINDER: +1.00
OS_ADD: +2.50
OS_AXIS: 160
OD_AXIS: 163
OS_SPHERE: +2.25
OD_SPHERE: PLANO
OD_ADD: +2.50

## 2024-07-25 ASSESSMENT — ENCOUNTER SYMPTOMS
NEUROLOGICAL NEGATIVE: 0
RESPIRATORY NEGATIVE: 0
ALLERGIC/IMMUNOLOGIC NEGATIVE: 0
CARDIOVASCULAR NEGATIVE: 0
PSYCHIATRIC NEGATIVE: 0
ENDOCRINE NEGATIVE: 0
GASTROINTESTINAL NEGATIVE: 0
CONSTITUTIONAL NEGATIVE: 0
HEMATOLOGIC/LYMPHATIC NEGATIVE: 0
MUSCULOSKELETAL NEGATIVE: 0
EYES NEGATIVE: 0

## 2024-07-25 ASSESSMENT — CUP TO DISC RATIO
OD_RATIO: .30
OS_RATIO: .30

## 2024-07-25 ASSESSMENT — TONOMETRY
IOP_METHOD: TONOPEN
OS_IOP_MMHG: 15
OD_IOP_MMHG: 15

## 2024-07-25 ASSESSMENT — EXTERNAL EXAM - RIGHT EYE: OD_EXAM: NORMAL

## 2024-07-25 ASSESSMENT — EXTERNAL EXAM - LEFT EYE: OS_EXAM: NORMAL

## 2024-07-25 NOTE — PROGRESS NOTES
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

## 2024-08-01 ENCOUNTER — OFFICE VISIT (OUTPATIENT)
Dept: PAIN MEDICINE | Facility: CLINIC | Age: 59
End: 2024-08-01
Payer: COMMERCIAL

## 2024-08-01 VITALS
OXYGEN SATURATION: 98 % | HEIGHT: 64 IN | SYSTOLIC BLOOD PRESSURE: 102 MMHG | DIASTOLIC BLOOD PRESSURE: 60 MMHG | HEART RATE: 69 BPM | WEIGHT: 130 LBS | BODY MASS INDEX: 22.2 KG/M2

## 2024-08-01 DIAGNOSIS — M47.817 SPONDYLOSIS WITHOUT MYELOPATHY OR RADICULOPATHY, LUMBOSACRAL REGION: Primary | ICD-10-CM

## 2024-08-01 PROCEDURE — 1036F TOBACCO NON-USER: CPT | Performed by: NURSE PRACTITIONER

## 2024-08-01 PROCEDURE — 99213 OFFICE O/P EST LOW 20 MIN: CPT | Performed by: NURSE PRACTITIONER

## 2024-08-01 PROCEDURE — 3074F SYST BP LT 130 MM HG: CPT | Performed by: NURSE PRACTITIONER

## 2024-08-01 PROCEDURE — 3008F BODY MASS INDEX DOCD: CPT | Performed by: NURSE PRACTITIONER

## 2024-08-01 PROCEDURE — 3078F DIAST BP <80 MM HG: CPT | Performed by: NURSE PRACTITIONER

## 2024-08-01 RX ORDER — KETOCONAZOLE 20 MG/ML
SHAMPOO, SUSPENSION TOPICAL 3 TIMES WEEKLY
COMMUNITY

## 2024-08-01 ASSESSMENT — PAIN SCALES - GENERAL: PAINLEVEL: 0-NO PAIN

## 2024-08-01 ASSESSMENT — PAIN - FUNCTIONAL ASSESSMENT
PAIN_FUNCTIONAL_ASSESSMENT: NO/DENIES PAIN
PAIN_FUNCTIONAL_ASSESSMENT: NO/DENIES PAIN

## 2024-08-01 NOTE — PROGRESS NOTES
Subjective   Patient ID: Kellen Shook is a 59 y.o. female who presents for Follow-up (Post RFA).    HPI  58-year-old female with past medical history significant for PARISH, HTN, BPPV, chronic hip pain, fibromyalgia, GERD, HLD, lumbar discogenic pain syndrome, CHAY on CPAP, osteoarthritis of multiple joints, Parkinson's disease, RLS, spondylosis of the lumbosacral region without myelopathy, thoracic radiculopathy, degenerative disc disease of the lumbar spine and spondylosis of the thoracic spine with radiculopathy.       She presents for a follow-up to her bilateral T6-7 and T7-8 RFA under fluoroscopic guidance with Dr. Matty Perez on 7/12/2024. She reports 100% decrease in pain post-procedure. She is very happy with these results.     Review of Systems    Objective   Physical Exam  General- No acute distress, well appearing and well nourished.   Eyes Conjunctiva and lids: No erythema, swelling or discharge  Neck - Supple, no cervical lymphadenopathy.   Pulmonary - Respiratory effort: Normal respiration.   Cardiovascular - Normal rate and rhythm.  Examination of extremities for edema and/or varicosities: No peripheral edema  Abdomen: Soft, Non-tender, non-distended, no abdominal masses.   Musculoskeletal - Improved ROM to the cervical and lumbar spines.  Skin - Skin and subcutaneous tissue: Normal without rashes or lesions.  Neurologic - Normal gait  Psychiatric - Orientation to person, place, and time: Normal. Mood and affect: Normal.    Assessment/Plan   Assessment & Plan  Spondylosis without myelopathy or radiculopathy, lumbosacral region         TREATMENT PLAN:  I had a nice discussion with the patient today and our plan will be as follows:  Radiology: No new imaging to review at this time.   Physically: Encouraged patient to continue with increased physical activity as able.   Psychologically: No acute psychological needs at this time.    Medication: Nothing at this time.   Duration: Chronic/ongoing.     Intervention: Patient is s/p bilateral T6-7 and T7-8 RFA under fluoroscopic guidance with Dr. Matty Perez on 7/12/2024. She reports 100% decrease in pain post-procedure. She is very happy with these results. She will follow-up with us on an as-needed basis.

## 2024-08-02 PROCEDURE — 88304 TISSUE EXAM BY PATHOLOGIST: CPT

## 2024-08-06 ENCOUNTER — LAB REQUISITION (OUTPATIENT)
Dept: DERMATOPATHOLOGY | Facility: CLINIC | Age: 59
End: 2024-08-06
Payer: COMMERCIAL

## 2024-08-06 DIAGNOSIS — R20.8 OTHER DISTURBANCES OF SKIN SENSATION: ICD-10-CM

## 2024-08-06 DIAGNOSIS — L53.8 OTHER SPECIFIED ERYTHEMATOUS CONDITIONS: ICD-10-CM

## 2024-08-06 DIAGNOSIS — L29.8 OTHER PRURITUS: ICD-10-CM

## 2024-08-06 DIAGNOSIS — D48.5 NEOPLASM OF UNCERTAIN BEHAVIOR OF SKIN: ICD-10-CM

## 2024-08-07 LAB
LABORATORY COMMENT REPORT: NORMAL
PATH REPORT.FINAL DX SPEC: NORMAL
PATH REPORT.GROSS SPEC: NORMAL
PATH REPORT.MICROSCOPIC SPEC OTHER STN: NORMAL
PATH REPORT.RELEVANT HX SPEC: NORMAL
PATH REPORT.TOTAL CANCER: NORMAL

## 2024-08-15 ENCOUNTER — OFFICE VISIT (OUTPATIENT)
Dept: ORTHOPEDIC SURGERY | Facility: CLINIC | Age: 59
End: 2024-08-15
Payer: COMMERCIAL

## 2024-08-15 DIAGNOSIS — M65.30 TRIGGER FINGER, ACQUIRED: Primary | ICD-10-CM

## 2024-08-15 PROCEDURE — 99214 OFFICE O/P EST MOD 30 MIN: CPT | Performed by: ORTHOPAEDIC SURGERY

## 2024-08-15 PROCEDURE — 1036F TOBACCO NON-USER: CPT | Performed by: ORTHOPAEDIC SURGERY

## 2024-08-15 PROCEDURE — 99204 OFFICE O/P NEW MOD 45 MIN: CPT | Performed by: ORTHOPAEDIC SURGERY

## 2024-08-15 RX ORDER — SODIUM CHLORIDE, SODIUM LACTATE, POTASSIUM CHLORIDE, CALCIUM CHLORIDE 600; 310; 30; 20 MG/100ML; MG/100ML; MG/100ML; MG/100ML
20 INJECTION, SOLUTION INTRAVENOUS CONTINUOUS
OUTPATIENT
Start: 2024-08-15

## 2024-08-15 ASSESSMENT — PATIENT HEALTH QUESTIONNAIRE - PHQ9
2. FEELING DOWN, DEPRESSED OR HOPELESS: NOT AT ALL
1. LITTLE INTEREST OR PLEASURE IN DOING THINGS: NOT AT ALL
SUM OF ALL RESPONSES TO PHQ9 QUESTIONS 1 AND 2: 0

## 2024-08-15 ASSESSMENT — PAIN SCALES - GENERAL: PAINLEVEL_OUTOF10: 2

## 2024-08-15 ASSESSMENT — PAIN - FUNCTIONAL ASSESSMENT: PAIN_FUNCTIONAL_ASSESSMENT: 0-10

## 2024-08-15 ASSESSMENT — ENCOUNTER SYMPTOMS
DEPRESSION: 0
OCCASIONAL FEELINGS OF UNSTEADINESS: 0
LOSS OF SENSATION IN FEET: 0

## 2024-08-16 NOTE — PROGRESS NOTES
History of Present Illness:  Chief Complaint   Patient presents with    Right Hand - Trigger Finger       Patient presents today for evaluation of right long finger pain. Endorses catching of the digit with flexion. Denies any traumatic event or injury. The patient notes that it is worse with periods of disuse and in the morning.  Pain is somewhat better with rest.  The patient endorses sharp focal pain when it catches.  Otherwise doing well following previous right open carpal tunnel release and concurrent right ring finger trigger release in 2020.  The right middle finger has been bothering her for about 1 and half years.    Past Medical History:   Diagnosis Date    Acute cystitis with hematuria 01/02/2020    Acute cystitis with hematuria    Allergic rhinitis     Anesthesia of skin 10/28/2019    Numbness of fingers    Arthritis     Body mass index (BMI) 31.0-31.9, adult 01/12/2021    BMI 31.0-31.9,adult    Body mass index (BMI) 33.0-33.9, adult 02/24/2021    BMI 33.0-33.9,adult    Body mass index (BMI) 35.0-35.9, adult 03/16/2019    BMI 35.0-35.9,adult    Body mass index (BMI) 35.0-35.9, adult 08/24/2021    BMI 35.0-35.9,adult    Body mass index (BMI) 36.0-36.9, adult 02/24/2022    BMI 36.0-36.9,adult    Carpal tunnel syndrome, right upper limb 05/21/2020    Carpal tunnel syndrome of right wrist    Chronic pain disorder     Arthritis    Cognitive disorder 2020    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    CPAP (continuous positive airway pressure) dependence     Cutaneous abscess of face 12/26/2014    Facial abscess    Diverticulosis 2018    Seen with first colonoscopy    Dizziness 2023    Occasionally    Encounter for immunization 06/15/2020    Influenza vaccine administered    Encounter for screening for malignant neoplasm of cervix 07/21/2020    Encounter for screening for malignant neoplasm of cervix    Encounter for screening for malignant neoplasm of colon 06/22/2019    Colon cancer screening    Encounter  for screening for malignant neoplasm of rectum 06/22/2019    Screening for rectal cancer    Fibroid     Fibromyalgia, primary     H/O chronic eczema     History of decompression of median nerve 02/21/2024    Hyperlipidemia     Hypertension     Joint pain     Hip and back    Localized swelling, mass and lump, right upper limb 05/21/2020    Mass of finger of right hand    Low back pain, unspecified 11/18/2019    Acute low back pain    Lumbar disc disease     Degenerative  disc disease    Medial epicondylitis, unspecified elbow 10/20/2020    Epicondylitis elbow, medial    Neuromuscular disorder (Multi)     Parkinsons    Obesity     Osteoarthritis     Other abnormal and inconclusive findings on diagnostic imaging of breast 02/18/2016    Abnormal mammogram of left breast    Other abnormal findings in urine     Leukocytes in urine    Other acute sinusitis 03/16/2019    Other acute sinusitis, recurrence not specified    Other amnesia 12/16/2020    Memory disorder    Other conditions influencing health status 04/18/2013    Nephrolithiasis    Other conditions influencing health status 01/28/2016    History of dyspareunia    Other microscopic hematuria 03/17/2018    Hematuria, microscopic    Other specified disorders of breast 02/24/2021    Breast asymmetry    Other specified postprocedural states 02/27/2020    History of carpal tunnel surgery    Pain in unspecified elbow 10/20/2020    Elbow pain    Parkinson disease (Multi)     Parkinson disease (Multi)     Peripheral neuropathy     Personal history of diseases of the skin and subcutaneous tissue 11/15/2017    History of dermatitis    Personal history of other diseases of the female genital tract 06/22/2019    History of postmenopausal bleeding    Personal history of other diseases of the musculoskeletal system and connective tissue 09/11/2019    History of spinal stenosis    Personal history of other diseases of the musculoskeletal system and connective tissue 01/27/2016     History of joint pain    Personal history of other diseases of the nervous system and sense organs 11/20/2020    History of Parkinson's disease    Personal history of other endocrine, nutritional and metabolic disease 11/20/2020    History of familial combined hyperlipidemia    Personal history of other specified conditions 08/23/2021    History of breast lump    Personal history of other specified conditions 01/02/2020    History of urinary frequency    Personal history of other specified conditions 09/14/2020    History of chest pain    Polyp of colon     Hyperplastic colon polyp    Restless leg     Rosacea     Skin disorder     Eczema    Sleep apnea, obstructive     Snoring     Trigger finger, right ring finger 05/21/2020    Trigger ring finger of right hand    Trochanteric bursitis, left hip 04/25/2016    Trochanteric bursitis of left hip    Vertigo 2023    Happens occasionally       Medication Documentation Review Audit       Reviewed by Hzael Monroy CMA (Medical Assistant) on 08/15/24 at 1014      Medication Order Taking? Sig Documenting Provider Last Dose Status   betamethasone valerate (Valisone) 0.1 % cream [id number] No Apply 1 Application topically 2 times a day as needed for rash (ECZEMA BOTH EARS). Historical Provider, MD Taking Active   carbidopa-levodopa (Sinemet)  mg tablet [id number] No Take 1 tablet by mouth 4 times a day. Take 1 tablet at approximately 9, 1, 5, 9. Do not take less than 4 hours apart. Unique Mcdonald PA-C Taking Active   estradiol (Vagifem) 10 mcg tablet vaginal tablet [id number] No Insert 1 tablet (10 mcg) into the vagina 2 times a week. Insert 1 tablet into vagina at bedtime for 2 weeks, then at bedtime twice a week. URMILA Ojeda-BOBM, ND Taking Active   estradiol (Yuvafem) 10 mcg tablet vaginal tablet [id number] No Insert 1 tablet (10 mcg) into the vagina once daily. Historical Provider, MD Taking Active   ezetimibe (Zetia) 10 mg tablet [id number] No Take 1 tablet (10  mg) by mouth once daily. MORNING Zafar Luke PA-C Taking Active   fexofenadine (Allegra) 180 mg tablet [id number] No Take 1 tablet (180 mg) by mouth once daily. Historical Provider, MD Taking Active   fluticasone (Flonase) 50 mcg/actuation nasal spray [id number] No Administer 1 spray into affected nostril(s) once daily. Historical Provider, MD Taking Active   gabapentin (Neurontin) 300 mg capsule [id number] No Take 2 capsules (600 mg) by mouth once daily at bedtime. Nathan Narayanan MD Taking Active   hydroCHLOROthiazide (HYDRODiuril) 25 mg tablet [id number] No TAKE 1 TABLET BY MOUTH ONCE DAILY Carlos Foster MD Taking Active   ketoconazole (NIZOral) 2 % shampoo [id number] No Apply topically 3 times a week. shampoo Historical Provider, MD Taking Active   lidocaine (Lidoderm) 5 % patch [id number] No Place 1 patch on the skin once daily. Remove & discard patch within 12 hours or as directed by MD. 4% patch Historical Provider, MD Taking Active   lisinopril 10 mg tablet [id number] No Take 1 tablet (10 mg) by mouth once daily. Zafar Luke PA-C Taking Active   metroNIDAZOLE (Metrolotion) 0.75 % lotion lotion [id number] No Apply 1 Application topically 2 times a day as needed (ROSECEA). Historical Provider, MD Taking Active   MULTIVITAMIN ORAL [id number] No Take 1 tablet by mouth once daily. Historical Provider, MD Taking Active   neomycin-bacitracnZn-polymyxnB (Neosporin Original) ointment [id number] No Apply 1 Application topically once daily. At  bed time Historical Provider, MD Taking Active   neomycin-polymyxin B-dexameth (Polydex) 3.5 mg/g-10,000 unit/g-0.1 % ointment ophthalmic ointment [id number] No Apply to both eyelids at bedtime Arjun Nair, OD Taking Active   oxybutynin XL (Ditropan-XL) 10 mg 24 hr tablet [id number] No TAKE 1 TABLET BY MOUTH EVERY DAY Suzanne Núñez PA-C Taking Active   triamcinolone (Kenalog) 0.025 % cream [id number] No Apply small amount of cream to affected area as needed Kenia Lynch,  APRN-CNM, ND Taking Active   triamcinolone (Kenalog) 0.5 % cream [id number] No 1 Application 2 times a day as needed for rash. Historical Provider, MD Taking Active                    Allergies   Allergen Reactions    Statins-Hmg-Coa Reductase Inhibitors Hives    Amoxicillin Rash and Unknown       Social History     Socioeconomic History    Marital status:      Spouse name: Not on file    Number of children: Not on file    Years of education: Not on file    Highest education level: Not on file   Occupational History    Not on file   Tobacco Use    Smoking status: Never    Smokeless tobacco: Never   Vaping Use    Vaping status: Never Used   Substance and Sexual Activity    Alcohol use: Yes     Comment: On occasion,  not weekly    Drug use: Never    Sexual activity: Yes     Partners: Male     Birth control/protection: Post-menopausal, Female Sterilization   Other Topics Concern    Not on file   Social History Narrative    Not on file     Social Determinants of Health     Financial Resource Strain: Not on file   Food Insecurity: Not on file   Transportation Needs: Not on file   Physical Activity: Not on file   Stress: Not on file   Social Connections: Not on file   Intimate Partner Violence: Not on file   Housing Stability: Not on file       Past Surgical History:   Procedure Laterality Date    CARPAL TUNNEL RELEASE  02/17/2020    COLONOSCOPY  03/17/2018    Complete Colonoscopy    EPIDURAL BLOCK INJECTION      EXPLORATORY LAPAROTOMY  11/1/2002    For adhedsions from  tubal ligation    OTHER SURGICAL HISTORY  07/18/2013    Lysis Of Peritoneal Adhesions Laparoscopic    OTHER SURGICAL HISTORY  07/21/2020    Endoscopy    TRIGGER FINGER RELEASE  2/17/2020    TUBAL LIGATION  03/01/2021    Tubal Ligation          Review of Systems   GENERAL: Negative for malaise, significant weight loss, fever  MUSCULOSKELETAL: see HPI  NEURO:  Negative     Physical Examination  Constitutional: Appears well-developed and  well-nourished.  Head: Normocephalic and atraumatic.  Eyes: EOMI grossly  Cardiovascular: Intact distal pulses.   Respiratory: Effort normal. No respiratory distress.  Neurologic: Alert and oriented to person, place, and time.  Skin: Skin is warm and dry.  Hematologic / Lymphatic: No lymphedema, lymphangitis.  Psychiatric: normal mood and affect. Behavior is normal.   Musculoskeletal:  right hand  No obvious atrophy, no skin changes  Tenderness, palpable nodule along the long finger flexor tendon sheath  Palpable catching/crepitus with active flexion and extension   No subluxation of the extensor tendon appreciated over the MP/IP joints.  Sensation intact distally.  Capillary refill less than 2 seconds distally.     Assessment:  Patient with right long finger trigger finger     Plan:  Nature of the diagnosis was discussed with the patient.  We also discussed the risks and benefits of treatment options for trigger finger.  These include splinting, corticosteroid injections and, if conservative options fail, surgical release.  Longer standing trigger finger reduces likelihood of successful resolution with corticosteroid injection.    She would like to proceed with surgical intervention.    Risks and benefits of continued nonoperative versus operative treatment options were reviewed.  The surgical benefits and the potential complications were reviewed with the patient today, as well as the day surgical setting and the likely postoperative course.  Patient understands that the goal of surgery is relief of some symptoms.  Risks discussed included, but were not limited to, residual/recurrent/worsening symptoms, pain, infection, bleeding, stiffness, injury to nerve/blood vessels/tendons, wound healing issues and possible need for reoperation.  I answered the patient's questions and surgical consent reviewed and obtained.  The patient has elected to proceed with surgery and we will schedule it at the patient's  convenience.    The patient will followup with us in the operating room and then postoperatively.

## 2024-08-16 NOTE — H&P (VIEW-ONLY)
History of Present Illness:  Chief Complaint   Patient presents with    Right Hand - Trigger Finger       Patient presents today for evaluation of right long finger pain. Endorses catching of the digit with flexion. Denies any traumatic event or injury. The patient notes that it is worse with periods of disuse and in the morning.  Pain is somewhat better with rest.  The patient endorses sharp focal pain when it catches.  Otherwise doing well following previous right open carpal tunnel release and concurrent right ring finger trigger release in 2020.  The right middle finger has been bothering her for about 1 and half years.    Past Medical History:   Diagnosis Date    Acute cystitis with hematuria 01/02/2020    Acute cystitis with hematuria    Allergic rhinitis     Anesthesia of skin 10/28/2019    Numbness of fingers    Arthritis     Body mass index (BMI) 31.0-31.9, adult 01/12/2021    BMI 31.0-31.9,adult    Body mass index (BMI) 33.0-33.9, adult 02/24/2021    BMI 33.0-33.9,adult    Body mass index (BMI) 35.0-35.9, adult 03/16/2019    BMI 35.0-35.9,adult    Body mass index (BMI) 35.0-35.9, adult 08/24/2021    BMI 35.0-35.9,adult    Body mass index (BMI) 36.0-36.9, adult 02/24/2022    BMI 36.0-36.9,adult    Carpal tunnel syndrome, right upper limb 05/21/2020    Carpal tunnel syndrome of right wrist    Chronic pain disorder     Arthritis    Cognitive disorder 2020    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    CPAP (continuous positive airway pressure) dependence     Cutaneous abscess of face 12/26/2014    Facial abscess    Diverticulosis 2018    Seen with first colonoscopy    Dizziness 2023    Occasionally    Encounter for immunization 06/15/2020    Influenza vaccine administered    Encounter for screening for malignant neoplasm of cervix 07/21/2020    Encounter for screening for malignant neoplasm of cervix    Encounter for screening for malignant neoplasm of colon 06/22/2019    Colon cancer screening    Encounter  for screening for malignant neoplasm of rectum 06/22/2019    Screening for rectal cancer    Fibroid     Fibromyalgia, primary     H/O chronic eczema     History of decompression of median nerve 02/21/2024    Hyperlipidemia     Hypertension     Joint pain     Hip and back    Localized swelling, mass and lump, right upper limb 05/21/2020    Mass of finger of right hand    Low back pain, unspecified 11/18/2019    Acute low back pain    Lumbar disc disease     Degenerative  disc disease    Medial epicondylitis, unspecified elbow 10/20/2020    Epicondylitis elbow, medial    Neuromuscular disorder (Multi)     Parkinsons    Obesity     Osteoarthritis     Other abnormal and inconclusive findings on diagnostic imaging of breast 02/18/2016    Abnormal mammogram of left breast    Other abnormal findings in urine     Leukocytes in urine    Other acute sinusitis 03/16/2019    Other acute sinusitis, recurrence not specified    Other amnesia 12/16/2020    Memory disorder    Other conditions influencing health status 04/18/2013    Nephrolithiasis    Other conditions influencing health status 01/28/2016    History of dyspareunia    Other microscopic hematuria 03/17/2018    Hematuria, microscopic    Other specified disorders of breast 02/24/2021    Breast asymmetry    Other specified postprocedural states 02/27/2020    History of carpal tunnel surgery    Pain in unspecified elbow 10/20/2020    Elbow pain    Parkinson disease (Multi)     Parkinson disease (Multi)     Peripheral neuropathy     Personal history of diseases of the skin and subcutaneous tissue 11/15/2017    History of dermatitis    Personal history of other diseases of the female genital tract 06/22/2019    History of postmenopausal bleeding    Personal history of other diseases of the musculoskeletal system and connective tissue 09/11/2019    History of spinal stenosis    Personal history of other diseases of the musculoskeletal system and connective tissue 01/27/2016     History of joint pain    Personal history of other diseases of the nervous system and sense organs 11/20/2020    History of Parkinson's disease    Personal history of other endocrine, nutritional and metabolic disease 11/20/2020    History of familial combined hyperlipidemia    Personal history of other specified conditions 08/23/2021    History of breast lump    Personal history of other specified conditions 01/02/2020    History of urinary frequency    Personal history of other specified conditions 09/14/2020    History of chest pain    Polyp of colon     Hyperplastic colon polyp    Restless leg     Rosacea     Skin disorder     Eczema    Sleep apnea, obstructive     Snoring     Trigger finger, right ring finger 05/21/2020    Trigger ring finger of right hand    Trochanteric bursitis, left hip 04/25/2016    Trochanteric bursitis of left hip    Vertigo 2023    Happens occasionally       Medication Documentation Review Audit       Reviewed by Hazel Monroy CMA (Medical Assistant) on 08/15/24 at 1014      Medication Order Taking? Sig Documenting Provider Last Dose Status   betamethasone valerate (Valisone) 0.1 % cream [id number] No Apply 1 Application topically 2 times a day as needed for rash (ECZEMA BOTH EARS). Historical Provider, MD Taking Active   carbidopa-levodopa (Sinemet)  mg tablet [id number] No Take 1 tablet by mouth 4 times a day. Take 1 tablet at approximately 9, 1, 5, 9. Do not take less than 4 hours apart. Unique Mcdonald PA-C Taking Active   estradiol (Vagifem) 10 mcg tablet vaginal tablet [id number] No Insert 1 tablet (10 mcg) into the vagina 2 times a week. Insert 1 tablet into vagina at bedtime for 2 weeks, then at bedtime twice a week. URMILA Ojeda-BOBM, ND Taking Active   estradiol (Yuvafem) 10 mcg tablet vaginal tablet [id number] No Insert 1 tablet (10 mcg) into the vagina once daily. Historical Provider, MD Taking Active   ezetimibe (Zetia) 10 mg tablet [id number] No Take 1 tablet (10  mg) by mouth once daily. MORNING Zafar Luke PA-C Taking Active   fexofenadine (Allegra) 180 mg tablet [id number] No Take 1 tablet (180 mg) by mouth once daily. Historical Provider, MD Taking Active   fluticasone (Flonase) 50 mcg/actuation nasal spray [id number] No Administer 1 spray into affected nostril(s) once daily. Historical Provider, MD Taking Active   gabapentin (Neurontin) 300 mg capsule [id number] No Take 2 capsules (600 mg) by mouth once daily at bedtime. Nathan Narayanan MD Taking Active   hydroCHLOROthiazide (HYDRODiuril) 25 mg tablet [id number] No TAKE 1 TABLET BY MOUTH ONCE DAILY Carlos Foster MD Taking Active   ketoconazole (NIZOral) 2 % shampoo [id number] No Apply topically 3 times a week. shampoo Historical Provider, MD Taking Active   lidocaine (Lidoderm) 5 % patch [id number] No Place 1 patch on the skin once daily. Remove & discard patch within 12 hours or as directed by MD. 4% patch Historical Provider, MD Taking Active   lisinopril 10 mg tablet [id number] No Take 1 tablet (10 mg) by mouth once daily. Zafar Luke PA-C Taking Active   metroNIDAZOLE (Metrolotion) 0.75 % lotion lotion [id number] No Apply 1 Application topically 2 times a day as needed (ROSECEA). Historical Provider, MD Taking Active   MULTIVITAMIN ORAL [id number] No Take 1 tablet by mouth once daily. Historical Provider, MD Taking Active   neomycin-bacitracnZn-polymyxnB (Neosporin Original) ointment [id number] No Apply 1 Application topically once daily. At  bed time Historical Provider, MD Taking Active   neomycin-polymyxin B-dexameth (Polydex) 3.5 mg/g-10,000 unit/g-0.1 % ointment ophthalmic ointment [id number] No Apply to both eyelids at bedtime Arjun Nair, OD Taking Active   oxybutynin XL (Ditropan-XL) 10 mg 24 hr tablet [id number] No TAKE 1 TABLET BY MOUTH EVERY DAY Suzanne Núñez PA-C Taking Active   triamcinolone (Kenalog) 0.025 % cream [id number] No Apply small amount of cream to affected area as needed Kenia Lynch,  APRN-CNM, ND Taking Active   triamcinolone (Kenalog) 0.5 % cream [id number] No 1 Application 2 times a day as needed for rash. Historical Provider, MD Taking Active                    Allergies   Allergen Reactions    Statins-Hmg-Coa Reductase Inhibitors Hives    Amoxicillin Rash and Unknown       Social History     Socioeconomic History    Marital status:      Spouse name: Not on file    Number of children: Not on file    Years of education: Not on file    Highest education level: Not on file   Occupational History    Not on file   Tobacco Use    Smoking status: Never    Smokeless tobacco: Never   Vaping Use    Vaping status: Never Used   Substance and Sexual Activity    Alcohol use: Yes     Comment: On occasion,  not weekly    Drug use: Never    Sexual activity: Yes     Partners: Male     Birth control/protection: Post-menopausal, Female Sterilization   Other Topics Concern    Not on file   Social History Narrative    Not on file     Social Determinants of Health     Financial Resource Strain: Not on file   Food Insecurity: Not on file   Transportation Needs: Not on file   Physical Activity: Not on file   Stress: Not on file   Social Connections: Not on file   Intimate Partner Violence: Not on file   Housing Stability: Not on file       Past Surgical History:   Procedure Laterality Date    CARPAL TUNNEL RELEASE  02/17/2020    COLONOSCOPY  03/17/2018    Complete Colonoscopy    EPIDURAL BLOCK INJECTION      EXPLORATORY LAPAROTOMY  11/1/2002    For adhedsions from  tubal ligation    OTHER SURGICAL HISTORY  07/18/2013    Lysis Of Peritoneal Adhesions Laparoscopic    OTHER SURGICAL HISTORY  07/21/2020    Endoscopy    TRIGGER FINGER RELEASE  2/17/2020    TUBAL LIGATION  03/01/2021    Tubal Ligation          Review of Systems   GENERAL: Negative for malaise, significant weight loss, fever  MUSCULOSKELETAL: see HPI  NEURO:  Negative     Physical Examination  Constitutional: Appears well-developed and  well-nourished.  Head: Normocephalic and atraumatic.  Eyes: EOMI grossly  Cardiovascular: Intact distal pulses.   Respiratory: Effort normal. No respiratory distress.  Neurologic: Alert and oriented to person, place, and time.  Skin: Skin is warm and dry.  Hematologic / Lymphatic: No lymphedema, lymphangitis.  Psychiatric: normal mood and affect. Behavior is normal.   Musculoskeletal:  right hand  No obvious atrophy, no skin changes  Tenderness, palpable nodule along the long finger flexor tendon sheath  Palpable catching/crepitus with active flexion and extension   No subluxation of the extensor tendon appreciated over the MP/IP joints.  Sensation intact distally.  Capillary refill less than 2 seconds distally.     Assessment:  Patient with right long finger trigger finger     Plan:  Nature of the diagnosis was discussed with the patient.  We also discussed the risks and benefits of treatment options for trigger finger.  These include splinting, corticosteroid injections and, if conservative options fail, surgical release.  Longer standing trigger finger reduces likelihood of successful resolution with corticosteroid injection.    She would like to proceed with surgical intervention.    Risks and benefits of continued nonoperative versus operative treatment options were reviewed.  The surgical benefits and the potential complications were reviewed with the patient today, as well as the day surgical setting and the likely postoperative course.  Patient understands that the goal of surgery is relief of some symptoms.  Risks discussed included, but were not limited to, residual/recurrent/worsening symptoms, pain, infection, bleeding, stiffness, injury to nerve/blood vessels/tendons, wound healing issues and possible need for reoperation.  I answered the patient's questions and surgical consent reviewed and obtained.  The patient has elected to proceed with surgery and we will schedule it at the patient's  convenience.    The patient will followup with us in the operating room and then postoperatively.

## 2024-08-30 DIAGNOSIS — I10 HYPERTENSION, UNSPECIFIED TYPE: ICD-10-CM

## 2024-08-30 RX ORDER — LISINOPRIL 10 MG/1
10 TABLET ORAL DAILY
Qty: 90 TABLET | Refills: 0 | Status: SHIPPED | OUTPATIENT
Start: 2024-08-30

## 2024-09-08 DIAGNOSIS — N95.2 POSTMENOPAUSAL ATROPHIC VAGINITIS: ICD-10-CM

## 2024-09-08 DIAGNOSIS — N94.10 DYSPAREUNIA IN FEMALE: ICD-10-CM

## 2024-09-09 RX ORDER — ESTRADIOL 10 UG/1
TABLET VAGINAL
Qty: 8 TABLET | Refills: 3 | Status: SHIPPED | OUTPATIENT
Start: 2024-09-09

## 2024-09-10 DIAGNOSIS — M47.24 SPONDYLOSIS OF THORACIC SPINE WITH RADICULOPATHY: ICD-10-CM

## 2024-09-10 DIAGNOSIS — M47.817 SPONDYLOSIS WITHOUT MYELOPATHY OR RADICULOPATHY, LUMBOSACRAL REGION: ICD-10-CM

## 2024-09-10 DIAGNOSIS — M54.14 THORACIC RADICULOPATHY: ICD-10-CM

## 2024-09-10 RX ORDER — CELECOXIB 200 MG/1
200 CAPSULE ORAL 2 TIMES DAILY
Qty: 60 CAPSULE | Refills: 3 | Status: SHIPPED | OUTPATIENT
Start: 2024-09-10

## 2024-09-13 ENCOUNTER — HOSPITAL ENCOUNTER (OUTPATIENT)
Facility: CLINIC | Age: 59
Setting detail: OUTPATIENT SURGERY
Discharge: HOME | End: 2024-09-13
Attending: ORTHOPAEDIC SURGERY | Admitting: ORTHOPAEDIC SURGERY
Payer: COMMERCIAL

## 2024-09-13 ENCOUNTER — PHARMACY VISIT (OUTPATIENT)
Dept: PHARMACY | Facility: CLINIC | Age: 59
End: 2024-09-13
Payer: COMMERCIAL

## 2024-09-13 VITALS
HEART RATE: 51 BPM | OXYGEN SATURATION: 96 % | RESPIRATION RATE: 20 BRPM | DIASTOLIC BLOOD PRESSURE: 66 MMHG | TEMPERATURE: 97.5 F | SYSTOLIC BLOOD PRESSURE: 140 MMHG

## 2024-09-13 DIAGNOSIS — M65.30 TRIGGER FINGER, ACQUIRED: Primary | ICD-10-CM

## 2024-09-13 PROCEDURE — 3600000003 HC OR TIME - INITIAL BASE CHARGE - PROCEDURE LEVEL THREE: Performed by: ORTHOPAEDIC SURGERY

## 2024-09-13 PROCEDURE — RXMED WILLOW AMBULATORY MEDICATION CHARGE

## 2024-09-13 PROCEDURE — 7100000009 HC PHASE TWO TIME - INITIAL BASE CHARGE: Performed by: ORTHOPAEDIC SURGERY

## 2024-09-13 PROCEDURE — 3600000008 HC OR TIME - EACH INCREMENTAL 1 MINUTE - PROCEDURE LEVEL THREE: Performed by: ORTHOPAEDIC SURGERY

## 2024-09-13 PROCEDURE — 2500000005 HC RX 250 GENERAL PHARMACY W/O HCPCS: Performed by: ORTHOPAEDIC SURGERY

## 2024-09-13 PROCEDURE — 26055 INCISE FINGER TENDON SHEATH: CPT | Performed by: ORTHOPAEDIC SURGERY

## 2024-09-13 PROCEDURE — 7100000010 HC PHASE TWO TIME - EACH INCREMENTAL 1 MINUTE: Performed by: ORTHOPAEDIC SURGERY

## 2024-09-13 PROCEDURE — 2500000001 HC RX 250 WO HCPCS SELF ADMINISTERED DRUGS (ALT 637 FOR MEDICARE OP): Performed by: ORTHOPAEDIC SURGERY

## 2024-09-13 RX ORDER — CHLORHEXIDINE GLUCONATE 40 MG/ML
SOLUTION TOPICAL AS NEEDED
Status: DISCONTINUED | OUTPATIENT
Start: 2024-09-13 | End: 2024-09-13 | Stop reason: HOSPADM

## 2024-09-13 RX ORDER — SODIUM CHLORIDE, SODIUM LACTATE, POTASSIUM CHLORIDE, CALCIUM CHLORIDE 600; 310; 30; 20 MG/100ML; MG/100ML; MG/100ML; MG/100ML
20 INJECTION, SOLUTION INTRAVENOUS CONTINUOUS
Status: DISCONTINUED | OUTPATIENT
Start: 2024-09-13 | End: 2024-09-13 | Stop reason: HOSPADM

## 2024-09-13 RX ORDER — LIDOCAINE HYDROCHLORIDE AND EPINEPHRINE 10; 10 MG/ML; UG/ML
INJECTION, SOLUTION INFILTRATION; PERINEURAL AS NEEDED
Status: DISCONTINUED | OUTPATIENT
Start: 2024-09-13 | End: 2024-09-13 | Stop reason: HOSPADM

## 2024-09-13 RX ORDER — SODIUM CHLORIDE 0.9 G/100ML
IRRIGANT IRRIGATION AS NEEDED
Status: DISCONTINUED | OUTPATIENT
Start: 2024-09-13 | End: 2024-09-13 | Stop reason: HOSPADM

## 2024-09-13 RX ORDER — HYDROCODONE BITARTRATE AND ACETAMINOPHEN 5; 325 MG/1; MG/1
1 TABLET ORAL EVERY 6 HOURS PRN
Qty: 6 TABLET | Refills: 0 | Status: SHIPPED | OUTPATIENT
Start: 2024-09-13 | End: 2024-09-15

## 2024-09-13 RX ORDER — BUPIVACAINE HCL/EPINEPHRINE 0.25-.0005
VIAL (ML) INJECTION AS NEEDED
Status: DISCONTINUED | OUTPATIENT
Start: 2024-09-13 | End: 2024-09-13 | Stop reason: HOSPADM

## 2024-09-13 ASSESSMENT — COLUMBIA-SUICIDE SEVERITY RATING SCALE - C-SSRS
6. HAVE YOU EVER DONE ANYTHING, STARTED TO DO ANYTHING, OR PREPARED TO DO ANYTHING TO END YOUR LIFE?: NO
1. IN THE PAST MONTH, HAVE YOU WISHED YOU WERE DEAD OR WISHED YOU COULD GO TO SLEEP AND NOT WAKE UP?: NO
2. HAVE YOU ACTUALLY HAD ANY THOUGHTS OF KILLING YOURSELF?: NO
2. HAVE YOU ACTUALLY HAD ANY THOUGHTS OF KILLING YOURSELF?: NO
6. HAVE YOU EVER DONE ANYTHING, STARTED TO DO ANYTHING, OR PREPARED TO DO ANYTHING TO END YOUR LIFE?: NO
1. IN THE PAST MONTH, HAVE YOU WISHED YOU WERE DEAD OR WISHED YOU COULD GO TO SLEEP AND NOT WAKE UP?: NO

## 2024-09-13 ASSESSMENT — PAIN - FUNCTIONAL ASSESSMENT
PAIN_FUNCTIONAL_ASSESSMENT: 0-10
PAIN_FUNCTIONAL_ASSESSMENT: 0-10

## 2024-09-13 ASSESSMENT — PAIN SCALES - GENERAL
PAINLEVEL_OUTOF10: 0 - NO PAIN
PAINLEVEL_OUTOF10: 0 - NO PAIN

## 2024-09-13 NOTE — OP NOTE
Pre-Operative Diagnosis: Right middle finger trigger  Post-Operative Diagnosis: same  Procedure: Right middle finger trigger release  Assistant: None  Anesthesia: Local   Complications: none  Estimated blood loss: minimal  Specimen: none  Findings: See below  Disposition: Good/PACU      Indications: Patient with symptomatic right middle finger trigger finger. We discussed risks and benefits of continued nonoperative versus operative treatment options and after thoroughly reviewing patient wished to proceed with operative intervention, as indicated. Expected operative and postoperative courses reviewed and all questions addressed.    Operative course: The patient was greeted in the preoperative holding area and the operative site was marked with indelible marker.  After confirming patient identifiers and surgical site a 5 mL mixture of lidocaine and Marcaine with epinephrine was infiltrated about the planned incision site using sterile technique.  Patient was brought back to the operating room suite and the right upper extremity was prepped and draped in standard sterile fashion and timeout procedure was performed as per standard protocol.  A transverse incision was made directly overlying the level of the right middle finger A1 pulley. Gentle spreading was carried down to the level of the A1 pulley.  Neurovascular structures were protected with gentle blunt retraction and A1 pulley was sharply released in a proximal to distal direction. Complete release distally was confirmed visually. Gentle spreading proximally also confirmed complete release. Patient was then able to demonstrate full composite flexion and extension with resolution of locking/catching.  PIP joint was also able to fully extend.  Wound was irrigated and reapproximated with 4-0 Monocryl suture in a buried interrupted fashion followed by Dermabond on the skin.  Dry sterile dressings were applied and patient was taken to the recovery room for further  care.    Patient will follow-up in 2 weeks for wound check.

## 2024-09-13 NOTE — DISCHARGE INSTRUCTIONS
Dr. Lockett Post-Operative Instructions  Hand/Wrist/Elbow    Activity:  Rest on the day of surgery.  Gradually resume your regular diet.    Anesthesia:  Numbing medication may last for 8-24 hours.    Post-Operative Medications:  You may resume your regular medications (including blood thinners).  You have been given a prescription for pain medication as needed.  You may also take over-the-counter anti-inflammatories and/or Tylenol for pain relief.  If you are taking the prescribed pain medication you must limit additional Tylenol (acetaminophen) intake to avoid overdose.    Dressings:  Keep your dressings clean and dry.  You may cover with a plastic bag or cast cover and seal bag to your skin above the bandage for showering.  If your dressing becomes wet or significantly bloodstained call the office at [phone].  Okay to remove outer dressings after 2-3 days and shower/wash hands.  Do not soak wound (I.e. no swimming pool, hot tub or dishes).    Post-Operative Care:  Keep the surgical site elevated above the level of your heart to limit swelling.  If your fingers are not included in the dressing you are encouraged to move your fingers regularly (full fist and full extension).  Regularly ice the surgical site.  You may also apply ice pack above the level of the dressing and this will cool the blood as it travels towards the surgical site.    Call Surgeon/Office at any time for:           Office Number: [phone]  Excessive bleeding  Loss of feeling (The local numbing medicine from surgery typically lasts 8-12 hours.  Nerve blocks can last 18-36 hours.)  Tight dressing: Make sure that you are elevating the operative site appropriately.  If no relief then call the office.                                                                                                        Circulation issues:  If fingers change to white or blue  Concerns/Problems with your surgery

## 2024-09-24 ENCOUNTER — APPOINTMENT (OUTPATIENT)
Dept: NEUROLOGY | Facility: CLINIC | Age: 59
End: 2024-09-24
Payer: COMMERCIAL

## 2024-09-25 ENCOUNTER — APPOINTMENT (OUTPATIENT)
Dept: NEUROLOGY | Facility: CLINIC | Age: 59
End: 2024-09-25
Payer: COMMERCIAL

## 2024-09-25 VITALS
HEART RATE: 66 BPM | RESPIRATION RATE: 18 BRPM | DIASTOLIC BLOOD PRESSURE: 82 MMHG | WEIGHT: 138 LBS | SYSTOLIC BLOOD PRESSURE: 146 MMHG | BODY MASS INDEX: 23.69 KG/M2

## 2024-09-25 DIAGNOSIS — G20.A1 PARKINSON DISEASE, SYMPTOMATIC (MULTI): Primary | ICD-10-CM

## 2024-09-25 PROCEDURE — 99205 OFFICE O/P NEW HI 60 MIN: CPT | Performed by: STUDENT IN AN ORGANIZED HEALTH CARE EDUCATION/TRAINING PROGRAM

## 2024-09-25 PROCEDURE — 3079F DIAST BP 80-89 MM HG: CPT | Performed by: STUDENT IN AN ORGANIZED HEALTH CARE EDUCATION/TRAINING PROGRAM

## 2024-09-25 PROCEDURE — 1036F TOBACCO NON-USER: CPT | Performed by: STUDENT IN AN ORGANIZED HEALTH CARE EDUCATION/TRAINING PROGRAM

## 2024-09-25 PROCEDURE — 3077F SYST BP >= 140 MM HG: CPT | Performed by: STUDENT IN AN ORGANIZED HEALTH CARE EDUCATION/TRAINING PROGRAM

## 2024-09-25 RX ORDER — CARBIDOPA AND LEVODOPA 25; 100 MG/1; MG/1
1.5 TABLET ORAL 4 TIMES DAILY
Qty: 540 TABLET | Refills: 3 | Status: SHIPPED | OUTPATIENT
Start: 2024-09-25 | End: 2025-09-25

## 2024-09-25 ASSESSMENT — UNIFIED PARKINSONS DISEASE RATING SCALE (UPDRS)
FINGER_TAPPING_LEFT: 3
SPEECH: 1
POSTURAL_TREMOR_LEFTHAND: 0
POSTURE: 0
FACIAL_EXPRESSION: 0
CONSTANCY_TREMOR_ATREST: 0
RIGIDITY_LLE: 0
POSTURAL_TREMOR_RIGHTHAND: 0
DYSKINESIAS_PRESENT: NO
MINUTES_SINCE_LEVODOPA: 60
PRONATION_SUPINATION_LEFT: 2
AMPLITUDE_RUE: 0
KINETIC_TREMOR_RIGHTHAND: 0
RIGIDITY_RLE: 0
CHAIR_RISING_SCALE: 0
CLINICAL_STATE: ON
RIGIDITY_LUE: 0
AMPLITUDE_RLE: 0
LEG_AGILITY_RIGHT: 0
LEG_AGILITY_LEFT: 2
RIGIDITY_RUE: 0
AMPLITUDE_LIP_JAW: 0
RIGIDITY_NECK: 1
SPONTANEITY_OF_MOVEMENT: 2
PARKINSONS_MEDS: YES
POSTURAL_STABILITY: 0
AMPLITUDE_LLE: 0
TOETAPPING_LEFT: 2
LEVODOPA: YES
AMPLITUDE_LUE: 0
TOETAPPING_RIGHT: 0
GAIT: 1
KINETIC_TREMOR_LEFTHAND: 0
TOTAL_SCORE: 20
FINGER_TAPPING_RIGHT: 2
PRONATION_SUPINATION_RIGHT: 2
HANDMOVEMENTS_RIGHT: 1
FREEZING_GAIT: 0

## 2024-09-25 ASSESSMENT — ENCOUNTER SYMPTOMS
OCCASIONAL FEELINGS OF UNSTEADINESS: 0
DEPRESSION: 0
LOSS OF SENSATION IN FEET: 0

## 2024-09-25 ASSESSMENT — VISUAL ACUITY: VA_NORMAL: 1

## 2024-09-25 NOTE — PATIENT INSTRUCTIONS
Pleasure meeting you today. As discussed we would like to increase the dose of the Sinemet so you have better control.  - Increase sinemet to 1.5 tablet 4 times a day.  - Come back to see us in 6 months but do not hesitate to call with any question or concern.

## 2024-09-25 NOTE — PROGRESS NOTES
Subjective     Kellen Shook is a right handed  59 y.o. year old female who presents with No chief complaint on file..   Visit type: new patient visit     Patient hast medical history of allergic rhinitis, anxiety, HTN, BPPV, chronic hip and back pain, fibromyalgia, GERD, HLD, CHAY on CPAP, PD, RLS. 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 Also had brain fog and difficulty planning tasks at work. Symptoms were similar to her sister's who has a diagnosis of PD (reports sister is in the later stages of PD). Maternal uncle has PD as well.    Wake hours are 8 am to 2 am. Currently taking CL 1 tab 9 am , 1 pm, 5 pm and 9 pm. Feels she has good control on this regimen. No SE , no dyskinesias. Feels med wearing off about 15-30 min before next dose.    Review of Motor symptoms:  Tremor:  Location- R hand                 Rest/postural/action- Mainly action  Stiffness/rigidity: Yes  Slowness:  Yes  Trouble walking:  Tripping on her feet, shuffling gait  Freezing of gait:  No  Balance problems: Prior to starting CL yes, not anymore  Falls:  1 fall in February when she had COVID (had a fever and passed out)  Changes in speech:  No  Swallowing difficulties:  No  Abnormal postures:  No  Handwriting: Micrographia  Activities of daily living (buttoning clothes, bathing, cutting food, etc):  Independent    Review of Non-Motor symptoms:  Cognition:  Memory- Some word finding difficulty                        Hallucinations-  No                          Mood:         Depression-  No                       Anxiety: No                       Obsessive/compulsive behaviors- No  Sleep disturbances including REM behavior disorder:Sleep is 90% good, uses CPAP  Sensory changes (ie, smell or taste):  No  Cramps/pain:  Yes  Gastrointestinal complaints/Constipation:  GI has slowed but not classic constipation   Urinary  retention or frequency:  Was having urinary urgency better with oxybutynin  Positional lightheadedness and/or syncope:  Yes  Excessive saliva/drooling:  Only while sleeping  Fatigue:  Yes    Lives with  who recently got diagnosed with colon cancer, works as , drinks occasionally, does not smoke, no recreational drug. Does not exercise because she spends the entire day walking and running at work. Helped her loose 75 lb.    Patient Active Problem List   Diagnosis    Abnormal fasting glucose    Abnormal screening mammogram    Adenomatous colon polyp    Allergic rhinitis    Anxiety disorder    Benign essential hypertension    BPV (benign positional vertigo), unspecified laterality    Chronic hip pain    D-dimer, elevated    Fibromyalgia    GERD without esophagitis    Hot flash, menopausal    Hyperlipidemia    Iron metabolism disorder    Left breast mass    Lichen sclerosus et atrophicus of the vulva    Lumbar discogenic pain syndrome    Cognitive impairment    Memory impairment    CHAY on CPAP    Osteoarthritis of multiple joints    Parkinson disease, symptomatic (Multi)    Plantar fascial fibromatosis    Postmenopausal atrophic vaginitis    Psoriasiform dermatitis    RLS (restless legs syndrome)    Rosacea    Trigger finger    Vaginal dryness, menopausal    Spondylosis without myelopathy or radiculopathy, lumbosacral region    Thoracic radiculopathy    Spondylosis of thoracic spine with radiculopathy    DDD (degenerative disc disease), lumbar    Disc displacement, lumbar    Breast asymmetry    History of decompression of median nerve    Hyperplastic polyp of large intestine    Impaired fasting glucose    Low back pain, unspecified    Overactive bladder    Nasal congestion    Cervical spondylosis without myelopathy    Hordeolum internum of left lower eyelid    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    Squamous blepharitis of upper and lower eyelids of both eyes    Meibomian  gland dysfunction    Allergic conjunctivitis of both eyes    Trigger finger, acquired      Past Medical History:   Diagnosis Date    Acute cystitis with hematuria 01/02/2020    Acute cystitis with hematuria    Allergic rhinitis     Anesthesia of skin 10/28/2019    Numbness of fingers    Arthritis     Body mass index (BMI) 31.0-31.9, adult 01/12/2021    BMI 31.0-31.9,adult    Body mass index (BMI) 33.0-33.9, adult 02/24/2021    BMI 33.0-33.9,adult    Body mass index (BMI) 35.0-35.9, adult 03/16/2019    BMI 35.0-35.9,adult    Body mass index (BMI) 35.0-35.9, adult 08/24/2021    BMI 35.0-35.9,adult    Body mass index (BMI) 36.0-36.9, adult 02/24/2022    BMI 36.0-36.9,adult    Carpal tunnel syndrome, right upper limb 05/21/2020    Carpal tunnel syndrome of right wrist    Chronic pain disorder     Arthritis    Cognitive disorder 2020    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    CPAP (continuous positive airway pressure) dependence     Cutaneous abscess of face 12/26/2014    Facial abscess    Diverticulosis 2018    Seen with first colonoscopy    Dizziness 2023    Occasionally    Encounter for immunization 06/15/2020    Influenza vaccine administered    Encounter for screening for malignant neoplasm of cervix 07/21/2020    Encounter for screening for malignant neoplasm of cervix    Encounter for screening for malignant neoplasm of colon 06/22/2019    Colon cancer screening    Encounter for screening for malignant neoplasm of rectum 06/22/2019    Screening for rectal cancer    Fibroid     Fibromyalgia, primary     H/O chronic eczema     History of decompression of median nerve 02/21/2024    Hyperlipidemia     Hypertension     Joint pain     Hip and back    Localized swelling, mass and lump, right upper limb 05/21/2020    Mass of finger of right hand    Low back pain, unspecified 11/18/2019    Acute low back pain    Lumbar disc disease     Degenerative  disc disease    Medial epicondylitis, unspecified elbow 10/20/2020     Epicondylitis elbow, medial    Neuromuscular disorder (Multi)     Parkinsons    Obesity     Osteoarthritis     Other abnormal and inconclusive findings on diagnostic imaging of breast 02/18/2016    Abnormal mammogram of left breast    Other abnormal findings in urine     Leukocytes in urine    Other acute sinusitis 03/16/2019    Other acute sinusitis, recurrence not specified    Other amnesia 12/16/2020    Memory disorder    Other conditions influencing health status 04/18/2013    Nephrolithiasis    Other conditions influencing health status 01/28/2016    History of dyspareunia    Other microscopic hematuria 03/17/2018    Hematuria, microscopic    Other specified disorders of breast 02/24/2021    Breast asymmetry    Other specified postprocedural states 02/27/2020    History of carpal tunnel surgery    Pain in unspecified elbow 10/20/2020    Elbow pain    Parkinson disease (Multi)     Parkinson disease (Multi)     Peripheral neuropathy     Personal history of diseases of the skin and subcutaneous tissue 11/15/2017    History of dermatitis    Personal history of other diseases of the female genital tract 06/22/2019    History of postmenopausal bleeding    Personal history of other diseases of the musculoskeletal system and connective tissue 09/11/2019    History of spinal stenosis    Personal history of other diseases of the musculoskeletal system and connective tissue 01/27/2016    History of joint pain    Personal history of other diseases of the nervous system and sense organs 11/20/2020    History of Parkinson's disease    Personal history of other endocrine, nutritional and metabolic disease 11/20/2020    History of familial combined hyperlipidemia    Personal history of other specified conditions 08/23/2021    History of breast lump    Personal history of other specified conditions 01/02/2020    History of urinary frequency    Personal history of other specified conditions 09/14/2020    History of chest pain     Polyp of colon     Hyperplastic colon polyp    Restless leg     Rosacea     Skin disorder     Eczema    Sleep apnea, obstructive     Snoring     Trigger finger, right ring finger 05/21/2020    Trigger ring finger of right hand    Trochanteric bursitis, left hip 04/25/2016    Trochanteric bursitis of left hip    Vertigo 2023    Happens occasionally      Past Surgical History:   Procedure Laterality Date    CARPAL TUNNEL RELEASE  02/17/2020    COLONOSCOPY  03/17/2018    Complete Colonoscopy    EPIDURAL BLOCK INJECTION      EXPLORATORY LAPAROTOMY  11/1/2002    For adhedsions from  tubal ligation    OTHER SURGICAL HISTORY  07/18/2013    Lysis Of Peritoneal Adhesions Laparoscopic    OTHER SURGICAL HISTORY  07/21/2020    Endoscopy    TRIGGER FINGER RELEASE  2/17/2020    TUBAL LIGATION  03/01/2021    Tubal Ligation      Social History     Socioeconomic History    Marital status:      Spouse name: Not on file    Number of children: Not on file    Years of education: Not on file    Highest education level: Not on file   Occupational History    Not on file   Tobacco Use    Smoking status: Never    Smokeless tobacco: Never   Vaping Use    Vaping status: Never Used   Substance and Sexual Activity    Alcohol use: Yes     Comment: On occasion,  not weekly    Drug use: Never    Sexual activity: Yes     Partners: Male     Birth control/protection: Post-menopausal, Female Sterilization   Other Topics Concern    Not on file   Social History Narrative    Not on file     Social Determinants of Health     Financial Resource Strain: Not on file   Food Insecurity: Not on file   Transportation Needs: Not on file   Physical Activity: Not on file   Stress: Not on file   Social Connections: Not on file   Intimate Partner Violence: Not on file   Housing Stability: Not on file      Family History   Problem Relation Name Age of Onset    Diabetes type II Mother Angelika Ragland     Hypertension Mother Angelika Ragland     Cancer Mother Angelika Ragland      Hearing loss Mother Angelika Ragland     Hyperlipidemia Mother Angelika Ragland     Heart attack Mother Angelika Ragland     Arthritis Mother Angelika Ragland     Alcohol abuse Father Marty Ragland     Cancer Father Marty Ragland     Arthritis Sister Marietta Preciado     Hypertension Sister Madisyn Rojas     No Known Problems Sister      Hypertension Brother Elia Ragland     Heart attack Brother Elia Ragland     No Known Problems Brother      No Known Problems Brother      No Known Problems Brother      No Known Problems Son      No Known Problems Son      No Known Problems Son      Breast cancer Cousin  65                 Review of Systems  All other system have been reviewed and are negative for complaint.    There were no vitals filed for this visit.  Objective   Neurological Exam  Mental Status  Awake and alert. Oriented only to person, place, time and situation. Speech is normal. Language is fluent with no aphasia. Attention and concentration are normal.    Cranial Nerves  CN II: Visual acuity is normal. Visual fields full to confrontation.  CN III, IV, VI: Extraocular movements intact bilaterally. Normal lids and orbits bilaterally.  CN V: Facial sensation is normal.  CN VII: Full and symmetric facial movement.  CN VIII: Hearing is normal.  CN IX, X: Palate elevates symmetrically  CN XI: Shoulder shrug strength is normal.  CN XII: Tongue midline without atrophy or fasciculations.    Motor   Strength is 5/5 throughout all four extremities.    Sensory  Light touch is normal in upper and lower extremities. Pinprick is normal in upper and lower extremities. Mild decreased temp sensation in b/l LE compared to UE. Vibration is normal in upper and lower extremities.     Reflexes                                            Right                      Left  Brachioradialis                    3+                         3+  Biceps                                 3+                         3+  Triceps                                3+                          3+  Patellar                                3+                         3+  Achilles                                3+                         3+  Right Plantar: equivocal  Left Plantar: equivocal    Right pathological reflexes: Suprapatellar present. Ankle clonus absent.  Left pathological reflexes: Suprapatellar present. Ankle clonus absent.    Coordination  Right: Finger-to-nose normal.Left: Finger-to-nose normal.    Gait  Casual gait is normal including stance, stride, and arm swing. Romberg is absent. Normal pull test. Able to rise from chair without using arms.  Mild reduced arm swing on right.        MDS UPDRS 1st Score: Motor Examination  Is the patient on medication for treating the symptoms of Parkinson's Disease?: Yes  Patients receiving medication for treating the symptoms of Parkinson's Disease, christopher the patient's clinical state.: On  Is the patient on Levodopa?: Yes  Minutes since last Levodopa dose: 60  Speech: 1  Facial Expression: 0  Rigidty Neck: 1  Rigidty RUE: 0  Rigidity - LUE: 0  Rigidity RLE: 0  Rigidity LLE: 0  Finger Tapping Right Hand: 2  Finger Tapping Left Hand: 3  Hand Movements- Right Hand: 1  Hand Movements- Left Hand: 1  Pronatiaon-Supination Movments - Right Hand: 2  Pronatiaon-Supination Movments Left Hand: 2  Toe Tapping Right Foot: 0  Toe Tapping - Left Foot: 2  Leg Agility - Right Le  Leg Agility - Left le  Arising from Chair: 0  Gait: 1  Freezing of Gait: 0  Postural Stability: 0  Posture: 0  Global Spontanteity of Movment ( Body Bradykinesia): 2  Postural Tremor - Right Hand: 0  Postural Tremor - Left hand: 0  Kinetic Tremor - Right hand: 0  Kinetic Tremor - Left hand: 0  Rest Tremor Amplitude - RUE: 0  Rest Tremor Amplitude - LUE: 0  Rest Tremor Amplitude - RLE: 0  Rest Tremor Amplitude - LLE: 0  Rest Tremor Amplitude - Lip/Jaw: 0  Constancy of Rest Tremor: 0  MDS UPDRS Total Score: 20  Were dyskinesias (chorea or dystonia) present during examination?:  No          Hemoglobin A1C   Date Value Ref Range Status   02/24/2023 5.1 % Final     Comment:          Diagnosis of Diabetes-Adults   Non-Diabetic: < or = 5.6%   Increased risk for developing diabetes: 5.7-6.4%   Diagnostic of diabetes: > or = 6.5%  .       Monitoring of Diabetes                Age (y)     Therapeutic Goal (%)   Adults:          >18           <7.0   Pediatrics:    13-18           <7.5                   7-12           <8.0                   0- 6            7.5-8.5   American Diabetes Association. Diabetes Care 33(S1), Jan 2010.       Estimated Average Glucose   Date Value Ref Range Status   02/24/2023 100 MG/DL Final     Thyroid Stimulating Hormone   Date Value Ref Range Status   04/26/2023 2.16 0.27 - 4.20 MIU/L Final     Comment:     Performed at [address] [id number]     MRI brain 11/09/2020    IMPRESSION:  Minimal nonspecific periventricular and subcortical T2 FLAIR  hyperintensities could reflect chronic small vessel ischemic changes  given the age of the patien    Assessment/Plan     Kellen Shook is a 59 y.o. RH female with Hx of PD with onset of Sx and Dx in 2020 with bradykinesia, shuffling gait and R hand tremors. Sister and maternal uncle also have pd. Has some mild fluctuation on current dose of CL 1 tab QID.    Plan  - Increase dose of Sinemet  mg to 1.5 tab QID  - FU in 6 months

## 2024-09-26 ENCOUNTER — APPOINTMENT (OUTPATIENT)
Dept: ORTHOPEDIC SURGERY | Facility: CLINIC | Age: 59
End: 2024-09-26
Payer: COMMERCIAL

## 2024-09-27 ENCOUNTER — OFFICE VISIT (OUTPATIENT)
Dept: ORTHOPEDIC SURGERY | Facility: CLINIC | Age: 59
End: 2024-09-27
Payer: COMMERCIAL

## 2024-09-27 VITALS — HEIGHT: 64 IN | BODY MASS INDEX: 23.05 KG/M2 | WEIGHT: 135 LBS

## 2024-09-27 DIAGNOSIS — Z98.890 STATUS POST TRIGGER FINGER RELEASE: ICD-10-CM

## 2024-09-27 DIAGNOSIS — M65.30 TRIGGER FINGER, ACQUIRED: Primary | ICD-10-CM

## 2024-09-27 PROCEDURE — 1036F TOBACCO NON-USER: CPT

## 2024-09-27 PROCEDURE — 99211 OFF/OP EST MAY X REQ PHY/QHP: CPT

## 2024-09-27 PROCEDURE — 3008F BODY MASS INDEX DOCD: CPT

## 2024-09-27 ASSESSMENT — PAIN SCALES - GENERAL: PAINLEVEL_OUTOF10: 0 - NO PAIN

## 2024-09-27 ASSESSMENT — PAIN - FUNCTIONAL ASSESSMENT: PAIN_FUNCTIONAL_ASSESSMENT: 0-10

## 2024-09-27 ASSESSMENT — PAIN DESCRIPTION - DESCRIPTORS: DESCRIPTORS: DISCOMFORT

## 2024-09-27 NOTE — PROGRESS NOTES
History of Present Illness  Chief Complaint   Patient presents with    Right Hand - Post-op     Right hand middle trigger finger release   having some discomfort cont to ice the area      Patient returns today denying any significant pain, did have increased pain a few days ago when she returned to work.  Endorses good relief of pre-op symptoms, no locking or catching of the finger. Denies any new neuro deficits. No fever or drainage.     Review of Systems   GENERAL: Negative for malaise, significant weight loss, fever  MUSCULOSKELETAL: see HPI  NEURO:  Negative     Examination  side: right hand  Healthy incision without erythema, warmth, or drainage   Sensation intact median, ulnar and radial nerve distribution   No palpable locking or catching  Good cap refill     Assessment:  Patient status post side: right middle finger trigger release     Plan  Patient to continue to use right hand to tolerance.  Weight bearing as tolerated.  Wound is healing nicely. Discussed incision care and scar massage.  Encouraged ROM of digits, formal OT if any difficulties.  Follow-up: moises Bower PA-C  09/27/24

## 2024-10-14 ENCOUNTER — TELEPHONE (OUTPATIENT)
Dept: ORTHOPEDIC SURGERY | Facility: CLINIC | Age: 59
End: 2024-10-14
Payer: COMMERCIAL

## 2024-10-14 NOTE — TELEPHONE ENCOUNTER
Spoke with patient per jem whitman PA-C that this is normal to have swelling and pain after having a release, she stated that she is working it on her own and she will call if she need an order for OT

## 2024-10-23 NOTE — PROGRESS NOTES
"Subjective   Patient ID: Kellen Shook is a 58 y.o. female who presents for a sleep evaluation with concerns of Sleep Apnea, Pap Adherence Followup, and Needs Pap Supplies/new Pap Machine (Needs head gear resized ).  HPI     Prior sleep study results:   2/17/2021: Diagnostic polysomnography: AHI 37.6  3/22/2021: CPAP titration study: AutoPap 6 to 12 cm H2O with EPR of 2 and Respironics Nuance Pro small nasal pillows mask.  Patient also noted to have sleep-related hypoventilation with a peak TC CO2 of 49.1 mmHg during the study    Prior sleep history:  12/4/2023: Office visit: Dr. Nathan Narayanan: \" # CHAY/hypoventilation  - severe CHAY with AHI ~ 37, and hypoventilation with Peak EtCO2 of 47 mmHg. F/u Pft was normal, further testing not warranted at this time.   - Bicarb around 30 for a while now, unlikely to be secondary to obesity as her BMI is 33.  - continue CPAP 6-12 with EPR of 2   - renewed supply order sent to [facility]  RLS  - increase to 600 mg nightly     # HTN  - /87 today  - no headache, blurry vision, chest pain, palpitation, dizziness, syncopal episodes   - continue to f/u with PCP      she has been working on weight loss, now down 30#, and BMI is 29 from 36 previously! Great job  She wants to get her own PAP machine now that she has her own health insurance. She has been using her diseased father-in-law's machine for the past 2 years. She reports that gabapentin 600 mg at bedtime has been good for her RLS \"    Current sleep history:  She presents for initial adherence with her new CPAP device.  She recently obtained her own CPAP device.  She previously had a borrowed device.     A downloaded compliance report was reviewed and was interpreted by myself as follows:  > 4 hour compliance was 90%, with an average use of 6 hours and 36 minutes, with a residual AHI 1.4 on AutoPap 6 to 12 cm H2O    Kellen Shook reports good benefit from her device.   She lost some weight and feels like the headgear is not fitting " Diagnosis: Chronic sinusitis   Procedure:  Septoplasty, submucous resection of inferior turbinates, endoscopic sinus surgery maxillary / ethmoid / sphenoid / frontal sinuses  Patient's preferred date:  To be determined  Duration of Procedure: 2.5 hours  Hospital: [facility]  Anesthesia: General  Length of Stay: Day Surgery  Surgical Assist: None  Consent:  To Be Done Morning of Surgery     Please coordinate:   - First postop visit 1 week after surgery   - Preop appt with PCP:  Yes  - Preop H&P/Testing to be done by: PCP     Antibiotics: None  Needs telemetry monitoring post op: No  Needs SCDs: Yes      quite properly and the mask is giving her horacio leak   Sleep schedule  on weekdays / work days:  Usual Bedtime 1 AM  Falls asleep around 2 AM  Wake time 9 AM  Total sleep time average is 7 hours/day  Naps no       Sleep schedule  on weekends/non work days : Same    Preferred sleeping position: Prone    Review of Systems    snoring and waking up sweaty/night sweats    SCREENING FOR SLEEP DISORDERS:    MOVEMENT DISORDER SYMPTOMS:    - Sensations: Patient does not have unusual sensations in their extremities that cause an urge to move them     DENIES  dreams upon falling asleep  hypnagogic/hypnopompic hallucinations  sleep paralysis  symptoms of cataplexy  sleep walking  kicking, punching, or acting out dreams    ESS 0   SHANELLE 3  FOSQ 40      Past Medical History:   Diagnosis Date    Acute cystitis with hematuria 01/02/2020    Acute cystitis with hematuria    Allergic rhinitis     Anesthesia of skin 10/28/2019    Numbness of fingers    Arthritis     Body mass index (BMI) 31.0-31.9, adult 01/12/2021    BMI 31.0-31.9,adult    Body mass index (BMI) 33.0-33.9, adult 02/24/2021    BMI 33.0-33.9,adult    Body mass index (BMI) 35.0-35.9, adult 03/16/2019    BMI 35.0-35.9,adult    Body mass index (BMI) 35.0-35.9, adult 08/24/2021    BMI 35.0-35.9,adult    Body mass index (BMI) 36.0-36.9, adult 02/24/2022    BMI 36.0-36.9,adult    Carpal tunnel syndrome, right upper limb 05/21/2020    Carpal tunnel syndrome of right wrist    Chronic pain disorder     Arthritis    Cognitive disorder 2020    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    CPAP (continuous positive airway pressure) dependence     Cutaneous abscess of face 12/26/2014    Facial abscess    Diverticulosis 2018    Seen with first colonoscopy    Dizziness 2023    Occasionally    Encounter for immunization 06/15/2020    Influenza vaccine administered    Encounter for screening for malignant neoplasm of cervix 07/21/2020    Encounter for screening for malignant neoplasm of cervix     Encounter for screening for malignant neoplasm of colon 06/22/2019    Colon cancer screening    Encounter for screening for malignant neoplasm of rectum 06/22/2019    Screening for rectal cancer    Fibroid     Fibromyalgia, primary     H/O chronic eczema     History of decompression of median nerve 2/21/2024    Hyperlipidemia     Hypertension     Joint pain     Hip and back    Localized swelling, mass and lump, right upper limb 05/21/2020    Mass of finger of right hand    Low back pain, unspecified 11/18/2019    Acute low back pain    Lumbar disc disease     Degenerative  disc disease    Medial epicondylitis, unspecified elbow 10/20/2020    Epicondylitis elbow, medial    Neuromuscular disorder (CMS/HCC)     Parkinsons    Obesity     Osteoarthritis     Other abnormal and inconclusive findings on diagnostic imaging of breast 02/18/2016    Abnormal mammogram of left breast    Other abnormal findings in urine     Leukocytes in urine    Other acute sinusitis 03/16/2019    Other acute sinusitis, recurrence not specified    Other amnesia 12/16/2020    Memory disorder    Other conditions influencing health status 04/18/2013    Nephrolithiasis    Other conditions influencing health status 01/28/2016    History of dyspareunia    Other microscopic hematuria 03/17/2018    Hematuria, microscopic    Other specified disorders of breast 02/24/2021    Breast asymmetry    Other specified postprocedural states 02/27/2020    History of carpal tunnel surgery    Pain in unspecified elbow 10/20/2020    Elbow pain    Parkinson disease     Personal history of diseases of the skin and subcutaneous tissue 11/15/2017    History of dermatitis    Personal history of other diseases of the female genital tract 06/22/2019    History of postmenopausal bleeding    Personal history of other diseases of the musculoskeletal system and connective tissue 09/11/2019    History of spinal stenosis    Personal history of other diseases of the  musculoskeletal system and connective tissue 01/27/2016    History of joint pain    Personal history of other diseases of the nervous system and sense organs 11/20/2020    History of Parkinson's disease    Personal history of other endocrine, nutritional and metabolic disease 11/20/2020    History of familial combined hyperlipidemia    Personal history of other specified conditions 08/23/2021    History of breast lump    Personal history of other specified conditions 01/02/2020    History of urinary frequency    Personal history of other specified conditions 09/14/2020    History of chest pain    Polyp of colon     Hyperplastic colon polyp    Restless leg     Rosacea     Skin disorder     Eczema    Sleep apnea, obstructive     Snoring     Trigger finger, right ring finger 05/21/2020    Trigger ring finger of right hand    Trochanteric bursitis, left hip 04/25/2016    Trochanteric bursitis of left hip    Vertigo 2023    Happens occasionally      Patient Active Problem List   Diagnosis    Abnormal fasting glucose    Abnormal screening mammogram    Adenomatous colon polyp    Allergic rhinitis    Anxiety disorder    Benign essential hypertension    BPV (benign positional vertigo), unspecified laterality    Chronic hip pain    D-dimer, elevated    Fibromyalgia    GERD without esophagitis    Hot flash, menopausal    Hyperlipidemia    Iron metabolism disorder    Left breast mass    Lichen sclerosus et atrophicus of the vulva    Lumbar discogenic pain syndrome    Cognitive impairment    Memory impairment    CHAY on CPAP    Osteoarthritis of multiple joints    Parkinson disease, symptomatic    Plantar fascial fibromatosis    Postmenopausal atrophic vaginitis    Psoriasiform dermatitis    RLS (restless legs syndrome)    Rosacea    Trigger finger    Vaginal dryness, menopausal    Spondylosis without myelopathy or radiculopathy, lumbosacral region    Thoracic radiculopathy    Spondylosis of thoracic spine with radiculopathy     DDD (degenerative disc disease), lumbar    Disc displacement, lumbar    Breast asymmetry    History of decompression of median nerve    Hyperplastic polyp of large intestine    Impaired fasting glucose    Low back pain, unspecified    Overactive bladder      Past Surgical History:   Procedure Laterality Date    CARPAL TUNNEL RELEASE  02/17/2020    COLONOSCOPY  03/17/2018    Complete Colonoscopy    EPIDURAL BLOCK INJECTION      EXPLORATORY LAPAROTOMY  11/1/2002    For adhedsions from  tubal ligation    OTHER SURGICAL HISTORY  07/18/2013    Lysis Of Peritoneal Adhesions Laparoscopic    OTHER SURGICAL HISTORY  07/21/2020    Endoscopy    TRIGGER FINGER RELEASE  2/17/2020    TUBAL LIGATION  03/01/2021    Tubal Ligation        Current Outpatient Medications:     betamethasone dipropionate 0.05 % cream, Apply 1 Application topically once daily as needed for rash., Disp: , Rfl:     betamethasone valerate (Valisone) 0.1 % cream, Apply 1 Application topically 2 times a day as needed for rash (ECZEMA BOTH EARS)., Disp: , Rfl:     carbidopa-levodopa (Sinemet)  mg tablet, Take 1 tablet by mouth 3 times a day. 7AM, 1PM, 6PM, Disp: , Rfl:     celecoxib (CeleBREX) 200 mg capsule, TAKE 1 CAPSULE (200 MG) BY MOUTH ONCE DAILY, Disp: 30 capsule, Rfl: 1    estradiol (Estrace) 0.01 % (0.1 mg/gram) vaginal cream, Insert 1 Applicatorful into the vagina once daily., Disp: , Rfl:     ezetimibe (Zetia) 10 mg tablet, Take 1 tablet (10 mg) by mouth once daily. MORNING, Disp: 90 tablet, Rfl: 1    fexofenadine (Allegra) 180 mg tablet, Take 1 tablet (180 mg) by mouth once daily., Disp: , Rfl:     fluticasone (Flonase) 50 mcg/actuation nasal spray, Administer 1 spray into affected nostril(s) once daily., Disp: , Rfl:     gabapentin (Neurontin) 300 mg capsule, Take 2 capsules (600 mg) by mouth once daily at bedtime., Disp: 180 capsule, Rfl: 3    hydroCHLOROthiazide (HYDRODiuril) 25 mg tablet, TAKE 1 TABLET BY MOUTH ONCE DAILY, Disp: 90 tablet,  Rfl: 3    lisinopril 10 mg tablet, Take 1 tablet (10 mg) by mouth once daily., Disp: 90 tablet, Rfl: 1    metroNIDAZOLE (Metrolotion) 0.75 % lotion lotion, Apply 1 Application topically 2 times a day as needed (ROSECEA)., Disp: , Rfl:     MULTIVITAMIN ORAL, Take 1 tablet by mouth once daily., Disp: , Rfl:     oxybutynin XL (Ditropan-XL) 10 mg 24 hr tablet, Take 1 tablet (10 mg) by mouth once daily., Disp: 90 tablet, Rfl: 1    triamcinolone (Kenalog) 0.5 % cream, 1 Application 2 times a day as needed for rash., Disp: , Rfl:    Allergies   Allergen Reactions    Statins-Hmg-Coa Reductase Inhibitors Hives    Amoxicillin Rash and Unknown      Social History     Socioeconomic History    Marital status:      Spouse name: Not on file    Number of children: Not on file    Years of education: Not on file    Highest education level: Not on file   Occupational History    Not on file   Tobacco Use    Smoking status: Never    Smokeless tobacco: Never   Vaping Use    Vaping Use: Never used   Substance and Sexual Activity    Alcohol use: Yes     Comment: On occasion,  not weekly    Drug use: Never    Sexual activity: Yes     Partners: Male     Birth control/protection: Post-menopausal, Female Sterilization   Other Topics Concern    Not on file   Social History Narrative    Not on file     Social Determinants of Health     Financial Resource Strain: Not on file   Food Insecurity: Not on file   Transportation Needs: Not on file   Physical Activity: Not on file   Stress: Not on file   Social Connections: Not on file   Intimate Partner Violence: Not on file   Housing Stability: Not on file        Family History   Problem Relation Name Age of Onset    Diabetes type II Mother Angelika Ragland     Hypertension Mother Angeliak Ragland     Cancer Mother Angelika Ragland     Hearing loss Mother Angelika Ragland     Hyperlipidemia Mother Angelika Ragland     Heart attack Mother Angelika Ragland     Arthritis Mother Angelika Ragland     Alcohol abuse Father Marty Ragland   "   Cancer Father Marty Ragland     Arthritis Sister Marietta Preciado     Hypertension Sister Madisyn Rojas     No Known Problems Sister      Hypertension Brother Elia Ragland     Heart attack Brother Elia Ragland     No Known Problems Brother      No Known Problems Brother      No Known Problems Brother      No Known Problems Son      No Known Problems Son      No Known Problems Son           Lab Results   Component Value Date    FERRITIN 107 03/02/2021        Objective   /62   Pulse 66   Ht 1.626 m (5' 4\")   Wt 60.8 kg (134 lb)   SpO2 99%   BMI 23.00 kg/m²    PREVIOUS WEIGHTS:  Wt Readings from Last 3 Encounters:   03/04/24 60.8 kg (134 lb)   02/22/24 60.8 kg (134 lb)   02/21/24 60.9 kg (134 lb 3.2 oz)     Physical Exam  PHYSICAL EXAM: GENERAL: alert pleasant and cooperative no acute distress  nasal mucosa , normal, and pink  MODIFIED DA SILVA SCORE: I  MODIFIED MALLAMPATI SCORE: I (soft palate, uvula, fauces, and tonsillar pillars visible)  UVULA: midline  TONSILLAR SCORE: 1+  TONGUE SCALLOPING: normal midline without scalloping  PSYCH EXAM: alert,oriented, in NAD with a full range of affect, normal behavior and no psychotic features  Assessment/Plan   Problem List Items Addressed This Visit             ICD-10-CM    Allergic rhinitis J30.9     Well managed, continue flonase         CHAY on CPAP - Primary G47.33     - Kellen Shook  has sleep apnea and requires treatment.  - Kellen Shook demonstrates good compliance and benefit from PAP therapy  - Continue Auto PAP 6-12 cmH20 through [employer].  - Order for renewal of PAP supplies placed   - Fitted with a F&P Eliane XS FFM         RLS (restless legs syndrome) G25.81     Well managed on gabapentin, will continue this for management             "

## 2024-11-09 DIAGNOSIS — E78.5 HYPERLIPIDEMIA, UNSPECIFIED HYPERLIPIDEMIA TYPE: ICD-10-CM

## 2024-11-11 RX ORDER — EZETIMIBE 10 MG/1
10 TABLET ORAL DAILY
Qty: 90 TABLET | Refills: 1 | Status: SHIPPED | OUTPATIENT
Start: 2024-11-11

## 2024-11-21 ENCOUNTER — OFFICE VISIT (OUTPATIENT)
Dept: PAIN MEDICINE | Facility: CLINIC | Age: 59
End: 2024-11-21
Payer: COMMERCIAL

## 2024-11-21 VITALS
HEART RATE: 62 BPM | WEIGHT: 135 LBS | DIASTOLIC BLOOD PRESSURE: 70 MMHG | OXYGEN SATURATION: 97 % | RESPIRATION RATE: 16 BRPM | HEIGHT: 64 IN | BODY MASS INDEX: 23.05 KG/M2 | SYSTOLIC BLOOD PRESSURE: 114 MMHG

## 2024-11-21 DIAGNOSIS — M47.814 THORACIC SPONDYLOSIS WITHOUT MYELOPATHY: Primary | ICD-10-CM

## 2024-11-21 DIAGNOSIS — G89.29 OTHER CHRONIC PAIN: ICD-10-CM

## 2024-11-21 PROCEDURE — 1036F TOBACCO NON-USER: CPT | Performed by: ANESTHESIOLOGY

## 2024-11-21 PROCEDURE — 3078F DIAST BP <80 MM HG: CPT | Performed by: ANESTHESIOLOGY

## 2024-11-21 PROCEDURE — 3074F SYST BP LT 130 MM HG: CPT | Performed by: ANESTHESIOLOGY

## 2024-11-21 PROCEDURE — 99214 OFFICE O/P EST MOD 30 MIN: CPT | Performed by: ANESTHESIOLOGY

## 2024-11-21 PROCEDURE — 3008F BODY MASS INDEX DOCD: CPT | Performed by: ANESTHESIOLOGY

## 2024-11-21 RX ORDER — METHYLPREDNISOLONE 4 MG/1
TABLET ORAL
Qty: 21 TABLET | Refills: 0 | Status: SHIPPED | OUTPATIENT
Start: 2024-11-21 | End: 2024-11-28

## 2024-11-21 ASSESSMENT — ENCOUNTER SYMPTOMS
GASTROINTESTINAL NEGATIVE: 1
BACK PAIN: 1
HEMATOLOGIC/LYMPHATIC NEGATIVE: 1
PSYCHIATRIC NEGATIVE: 1
ALLERGIC/IMMUNOLOGIC NEGATIVE: 1
RESPIRATORY NEGATIVE: 1
EYES NEGATIVE: 1
CARDIOVASCULAR NEGATIVE: 1
NEUROLOGICAL NEGATIVE: 1
ENDOCRINE NEGATIVE: 1
CONSTITUTIONAL NEGATIVE: 1

## 2024-11-21 ASSESSMENT — PAIN SCALES - GENERAL
PAINLEVEL_OUTOF10: 3
PAINLEVEL_OUTOF10: 3

## 2024-11-21 ASSESSMENT — PAIN DESCRIPTION - DESCRIPTORS: DESCRIPTORS: ACHING

## 2024-11-21 ASSESSMENT — PAIN - FUNCTIONAL ASSESSMENT: PAIN_FUNCTIONAL_ASSESSMENT: 0-10

## 2024-11-21 NOTE — PROGRESS NOTES
Subjective   Patient ID: Kellen Shook is a 59 y.o. female who presents for Back Pain.  Back Pain  Patient today for follow-up on her thoracic pain.  She underwent bilateral T6-7 and T7-8 radiofrequency ablation approximately 5 months ago.  She had been doing well through the summer and through the fall and her pain scores would range between a 1 and a 2.  Now she is seeing that the ablation is starting to wear off.  She can get as high as a 4-5 now.  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  She still able to work full-time but she has noticed that this pain has started to elevate for her.  And she is becoming more comfortable with sitting.  She would like to get set up for a repeat of the procedure since she had near 90% relief.  She is reporting no radiation pain at this time.  She is reporting no numbness, tingling, weakness or bowel or bladder issues today.    Review of Systems   Constitutional: Negative.    HENT: Negative.     Eyes: Negative.    Respiratory: Negative.     Cardiovascular: Negative.    Gastrointestinal: Negative.    Endocrine: Negative.    Genitourinary: Negative.    Musculoskeletal:  Positive for back pain.   Skin: Negative.    Allergic/Immunologic: Negative.    Neurological: Negative.    Hematological: Negative.    Psychiatric/Behavioral: Negative.         Objective   Physical Exam  Vitals and nursing note reviewed.   Constitutional:       Appearance: Normal appearance.   HENT:      Head: Normocephalic and atraumatic.      Right Ear: Ear canal and external ear normal.      Left Ear: Ear canal and external ear normal.      Nose: Nose normal.      Mouth/Throat:      Mouth: Mucous membranes are moist.      Pharynx: Oropharynx is clear.   Eyes:      Conjunctiva/sclera: Conjunctivae normal.      Pupils: Pupils are equal, round, and reactive to light.   Cardiovascular:      Rate and Rhythm: Normal rate.   Pulmonary:      Effort: Pulmonary effort is normal. No respiratory  distress.   Musculoskeletal:      Cervical back: Normal range of motion and neck supple. No tenderness.      Thoracic back: Tenderness present. Decreased range of motion.        Back:    Skin:     General: Skin is warm and dry.   Neurological:      Mental Status: She is alert and oriented to person, place, and time.      Sensory: Sensation is intact.      Motor: Motor function is intact.      Coordination: Coordination is intact.      Gait: Gait is intact.      Deep Tendon Reflexes:      Reflex Scores:       Bicep reflexes are 2+ on the right side and 2+ on the left side.       Brachioradialis reflexes are 2+ on the right side and 2+ on the left side.     Comments: [place] -   Psychiatric:         Mood and Affect: Mood normal.         Thought Content: Thought content normal.       Assessment/Plan   Problem List Items Addressed This Visit    None  Visit Diagnoses         Codes    Thoracic spondylosis without myelopathy    -  Primary M47.814    Relevant Medications    methylPREDNISolone (Medrol Dospak) 4 mg tablets    Other Relevant Orders    Radiofrequency Ablation    FL pain management    Other chronic pain     G89.29          I nice discussion with the patient today our plan will be as follows.    Radiology: All imaging has been reviewed.  Patient with spondylosis at T6/7 and T7/8.      Physically:  Patient to keep exercise program at home.     Psychologically:  NO BH issues at this time.     Medication: Patient to take medrol dose germain to help calm down acute thoracic pain.     Duration:  > 1 year    Intervention: Patient did excellent with bilateral T6-7 and T7-8 medial branch nerve radiofrequency ablation approximately 5 and half months ago.  The patient has started to see her pain started to increase she was normally about a 2-3 she is now getting up as high as a 5.  She would like to get set up and approval for repeat of the procedure at her 6-month anniversary.        Please note that this report has been  produced using speech recognition software. It may contain errors related to grammar, punctuation or spelling. Electronically signed, but not reviewed.          Matty Perez MD 11/21/24 9:53 AM

## 2024-11-24 DIAGNOSIS — N32.81 OVERACTIVE BLADDER: ICD-10-CM

## 2024-11-25 RX ORDER — OXYBUTYNIN CHLORIDE 10 MG/1
10 TABLET, EXTENDED RELEASE ORAL DAILY
Qty: 90 TABLET | Refills: 1 | Status: SHIPPED | OUTPATIENT
Start: 2024-11-25

## 2024-12-01 DIAGNOSIS — I10 HYPERTENSION, UNSPECIFIED TYPE: ICD-10-CM

## 2024-12-02 RX ORDER — LISINOPRIL 10 MG/1
10 TABLET ORAL DAILY
Qty: 90 TABLET | Refills: 3 | Status: SHIPPED | OUTPATIENT
Start: 2024-12-02

## 2024-12-09 ENCOUNTER — APPOINTMENT (OUTPATIENT)
Dept: SLEEP MEDICINE | Facility: CLINIC | Age: 59
End: 2024-12-09
Payer: COMMERCIAL

## 2024-12-09 VITALS
SYSTOLIC BLOOD PRESSURE: 118 MMHG | HEART RATE: 66 BPM | BODY MASS INDEX: 22.81 KG/M2 | WEIGHT: 133.6 LBS | DIASTOLIC BLOOD PRESSURE: 66 MMHG | HEIGHT: 64 IN | OXYGEN SATURATION: 98 %

## 2024-12-09 DIAGNOSIS — G25.81 RLS (RESTLESS LEGS SYNDROME): ICD-10-CM

## 2024-12-09 DIAGNOSIS — G47.33 OSA ON CPAP: Primary | ICD-10-CM

## 2024-12-09 PROCEDURE — 1036F TOBACCO NON-USER: CPT | Performed by: INTERNAL MEDICINE

## 2024-12-09 PROCEDURE — 99214 OFFICE O/P EST MOD 30 MIN: CPT | Performed by: INTERNAL MEDICINE

## 2024-12-09 PROCEDURE — 3074F SYST BP LT 130 MM HG: CPT | Performed by: INTERNAL MEDICINE

## 2024-12-09 PROCEDURE — 3078F DIAST BP <80 MM HG: CPT | Performed by: INTERNAL MEDICINE

## 2024-12-09 PROCEDURE — 3008F BODY MASS INDEX DOCD: CPT | Performed by: INTERNAL MEDICINE

## 2024-12-09 RX ORDER — GABAPENTIN 300 MG/1
600 CAPSULE ORAL NIGHTLY
Qty: 180 CAPSULE | Refills: 3 | Status: SHIPPED | OUTPATIENT
Start: 2024-12-09 | End: 2025-12-09

## 2024-12-09 ASSESSMENT — SLEEP AND FATIGUE QUESTIONNAIRES
SITING INACTIVE IN A PUBLIC PLACE LIKE A CLASS ROOM OR A MOVIE THEATER: WOULD NEVER DOZE
HOW LIKELY ARE YOU TO NOD OFF OR FALL ASLEEP WHILE SITTING AND TALKING TO SOMEONE: WOULD NEVER DOZE
ESS-CHAD TOTAL SCORE: 6
HOW LIKELY ARE YOU TO NOD OFF OR FALL ASLEEP IN A CAR, WHILE STOPPED FOR A FEW MINUTES IN TRAFFIC: WOULD NEVER DOZE
HOW LIKELY ARE YOU TO NOD OFF OR FALL ASLEEP WHILE WATCHING TV: MODERATE CHANCE OF DOZING
HOW LIKELY ARE YOU TO NOD OFF OR FALL ASLEEP WHILE SITTING QUIETLY AFTER LUNCH WITHOUT ALCOHOL: WOULD NEVER DOZE
HOW LIKELY ARE YOU TO NOD OFF OR FALL ASLEEP WHEN YOU ARE A PASSENGER IN A CAR FOR AN HOUR WITHOUT A BREAK: WOULD NEVER DOZE
HOW LIKELY ARE YOU TO NOD OFF OR FALL ASLEEP WHILE SITTING AND READING: SLIGHT CHANCE OF DOZING
HOW LIKELY ARE YOU TO NOD OFF OR FALL ASLEEP WHILE LYING DOWN TO REST IN THE AFTERNOON WHEN CIRCUMSTANCES PERMIT: HIGH CHANCE OF DOZING

## 2024-12-09 ASSESSMENT — PAIN SCALES - GENERAL: PAINLEVEL_OUTOF10: 0-NO PAIN

## 2024-12-09 NOTE — PROGRESS NOTES
"     Patient: Kellen Shook    [id number]  : 1965 -- AGE 59 y.o.    Provider: Elia Gavin MD     Location [facility]   Service Date: 2024              [facility]  Followup Visit Note  Subjective   Patient ID: Kellen Shook is a 59 y.o. female who presents for Sleep Apnea and Pap Adherence Followup.  HPI    Prior Sleep History:  2021: Diagnostic polysomnography: AHI 37.6  3/22/2021: CPAP titration study: AutoPap 6 to 12 cm H2O with EPR of 2 and Respironics Nuance Pro small nasal pillows mask.  Patient also noted to have sleep-related hypoventilation with a peak TC CO2 of 49.1 mmHg during the study   2023: Office visit: Dr. Nathan Narayanan: \" # CHAY/hypoventilation  - severe CHAY with AHI ~ 37, and hypoventilation with Peak EtCO2 of 47 mmHg. F/u Pft was normal, further testing not warranted at this time.   - Bicarb around 30 for a while now, unlikely to be secondary to obesity as her BMI is 33.  - continue CPAP 6-12 with EPR of 2   - renewed supply order sent to [facility] [place]  RLS  - increase to 600 mg nightly     # HTN  - /87 today  - no headache, blurry vision, chest pain, palpitation, dizziness, syncopal episodes   - continue to f/u with PCP      she has been working on weight loss, now down 30#, and BMI is 29 from 36 previously! Great job  She wants to get her own PAP machine now that she has her own health insurance. She has been using her diseased father-in-law's machine for the past 2 years. She reports that gabapentin 600 mg at bedtime has been good for her RLS \"  3/4/2024: Office Visit: Dr. Elia Gavin: She is currently her own CPAP device.  She previously was borrowing 1 headgear not fitting quite properly due to weight loss.  Fit with an [employer] Eliane mask fullface mask extra small.  RLS well-managed on gabapentin with normal ferritin level    Current Sleep History:  Kellen presents for follow-up on the management of her sleep " "apnea which is currently being managed with positive airway pressure therapy.   Follow-up to monitor her response to mask change  A downloaded compliance report was reviewed and was interpreted by myself as follows:  > 4 hour compliance was 93 %, with an average use of 6 hours and 29 minutes, with a residual AHI 0.8 on AutoPAP 6-12 cmH2O .   She went to the Res Med F20 medium mask, instead of the nasal. She is not sure whether she needs a small.  She needs a refill of the gabapentin.     She reports some difficulty falling asleep with the gabapentin  She is having back issues that is affecting her sleep and planning on getting an ablation. She has bone spurs and is following with Dr. Perez. Needs ablation every 6 months. Back pain and her job is affecting her arthritis.  Gabapentin helps her get a good sleep    Kellen Shook reports  good benefit from her device.    RLS managed with gabapentin  ESS: 6     Review of Systems  Review of systems negative except as per HPI  Objective   /66   Pulse 66   Ht 1.626 m (5' 4\")   Wt 60.6 kg (133 lb 9.6 oz)   SpO2 98%   BMI 22.93 kg/m²    PREVIOUS WEIGHTS:  Wt Readings from Last 3 Encounters:   12/09/24 60.6 kg (133 lb 9.6 oz)   11/21/24 61.2 kg (135 lb)   09/27/24 61.2 kg (135 lb)       Physical Exam  PHYSICAL EXAM: GENERAL: alert pleasant and cooperative no acute distress  PSYCH EXAM: alert,oriented, in NAD with a full range of affect, normal behavior and no psychotic features    Lab Results   Component Value Date    FERRITIN 107 03/02/2021        Assessment/Plan   Problem List Items Addressed This Visit             ICD-10-CM    CHAY on CPAP - Primary G47.33     - Kellen Shook  has sleep apnea and requires treatment.  - Kellen Shook demonstrates good compliance and benefit from PAP therapy  - Continue Auto PAP 6-12 cmH20   - Order for renewal of PAP supplies placed through [employer] [facility]  She is using an F20 medium mask, uncomfortable since losing " weight.  Mask refitting performed today  - Follow-up in 12 months          RLS (restless legs syndrome) G25.81     Managed with gabapentin 600 mg. It is providing dual benefit for RLS and her lower back issues, follows with pain management and is getting ablation every 6 months or so         Relevant Medications    gabapentin (Neurontin) 300 mg capsule

## 2024-12-09 NOTE — ASSESSMENT & PLAN NOTE
Managed with gabapentin 600 mg. It is providing dual benefit for RLS and her lower back issues, follows with pain management and is getting ablation every 6 months or so

## 2024-12-09 NOTE — ASSESSMENT & PLAN NOTE
- Kellen Shook  has sleep apnea and requires treatment.  - Kellen Shook demonstrates good compliance and benefit from PAP therapy  - Continue Auto PAP 6-12 cmH20   - Order for renewal of PAP supplies placed through [facility]  She is using an F20 medium mask, uncomfortable since losing weight.  Mask refitting performed today  - Follow-up in 12 months

## 2024-12-27 DIAGNOSIS — N95.2 POSTMENOPAUSAL ATROPHIC VAGINITIS: ICD-10-CM

## 2024-12-27 DIAGNOSIS — N94.10 DYSPAREUNIA IN FEMALE: ICD-10-CM

## 2025-01-02 RX ORDER — ESTRADIOL 10 UG/1
10 INSERT VAGINAL 2 TIMES WEEKLY
Qty: 24 TABLET | Refills: 0 | Status: SHIPPED | OUTPATIENT
Start: 2025-01-02 | End: 2025-04-02

## 2025-01-24 ENCOUNTER — HOSPITAL ENCOUNTER (OUTPATIENT)
Dept: GASTROENTEROLOGY | Facility: HOSPITAL | Age: 60
Discharge: HOME | End: 2025-01-24
Payer: COMMERCIAL

## 2025-01-24 ENCOUNTER — ANESTHESIA EVENT (OUTPATIENT)
Dept: GASTROENTEROLOGY | Facility: HOSPITAL | Age: 60
End: 2025-01-24
Payer: COMMERCIAL

## 2025-01-24 ENCOUNTER — ANESTHESIA (OUTPATIENT)
Dept: GASTROENTEROLOGY | Facility: HOSPITAL | Age: 60
End: 2025-01-24
Payer: COMMERCIAL

## 2025-01-24 VITALS
DIASTOLIC BLOOD PRESSURE: 60 MMHG | HEART RATE: 73 BPM | RESPIRATION RATE: 15 BRPM | OXYGEN SATURATION: 96 % | SYSTOLIC BLOOD PRESSURE: 105 MMHG

## 2025-01-24 DIAGNOSIS — M47.814 THORACIC SPONDYLOSIS WITHOUT MYELOPATHY: ICD-10-CM

## 2025-01-24 PROCEDURE — 2500000004 HC RX 250 GENERAL PHARMACY W/ HCPCS (ALT 636 FOR OP/ED): Performed by: ANESTHESIOLOGY

## 2025-01-24 PROCEDURE — 64634 DESTROY C/TH FACET JNT ADDL: CPT | Mod: 50 | Performed by: ANESTHESIOLOGY

## 2025-01-24 PROCEDURE — 64633 DESTROY CERV/THOR FACET JNT: CPT | Performed by: ANESTHESIOLOGY

## 2025-01-24 RX ORDER — LIDOCAINE HYDROCHLORIDE 20 MG/ML
INJECTION, SOLUTION EPIDURAL; INFILTRATION; INTRACAUDAL; PERINEURAL AS NEEDED
Status: COMPLETED | OUTPATIENT
Start: 2025-01-24 | End: 2025-01-24

## 2025-01-24 RX ORDER — BUPIVACAINE HYDROCHLORIDE 5 MG/ML
INJECTION, SOLUTION PERINEURAL AS NEEDED
Status: COMPLETED | OUTPATIENT
Start: 2025-01-24 | End: 2025-01-24

## 2025-01-24 RX ORDER — TRIAMCINOLONE ACETONIDE 40 MG/ML
INJECTION, SUSPENSION INTRA-ARTICULAR; INTRAMUSCULAR AS NEEDED
Status: COMPLETED | OUTPATIENT
Start: 2025-01-24 | End: 2025-01-24

## 2025-01-24 RX ORDER — MIDAZOLAM HYDROCHLORIDE 1 MG/ML
INJECTION, SOLUTION INTRAMUSCULAR; INTRAVENOUS AS NEEDED
Status: COMPLETED | OUTPATIENT
Start: 2025-01-24 | End: 2025-01-24

## 2025-01-24 RX ORDER — FENTANYL CITRATE 50 UG/ML
INJECTION, SOLUTION INTRAMUSCULAR; INTRAVENOUS AS NEEDED
Status: COMPLETED | OUTPATIENT
Start: 2025-01-24 | End: 2025-01-24

## 2025-01-24 RX ADMIN — TRIAMCINOLONE ACETONIDE 40 MG: 40 INJECTION, SUSPENSION INTRA-ARTICULAR; INTRAMUSCULAR at 10:31

## 2025-01-24 RX ADMIN — FENTANYL CITRATE 100 MCG: 50 INJECTION, SOLUTION INTRAMUSCULAR; INTRAVENOUS at 10:27

## 2025-01-24 RX ADMIN — LIDOCAINE HYDROCHLORIDE 10 ML: 20 INJECTION, SOLUTION EPIDURAL; INFILTRATION; INTRACAUDAL; PERINEURAL at 10:30

## 2025-01-24 RX ADMIN — BUPIVACAINE HYDROCHLORIDE 4 ML: 5 INJECTION, SOLUTION PERINEURAL at 10:31

## 2025-01-24 RX ADMIN — MIDAZOLAM 4 MG: 1 INJECTION INTRAMUSCULAR; INTRAVENOUS at 10:28

## 2025-01-24 ASSESSMENT — ENCOUNTER SYMPTOMS
ARTHRALGIAS: 1
LOSS OF SENSATION IN FEET: 0
BACK PAIN: 1
OCCASIONAL FEELINGS OF UNSTEADINESS: 0
DEPRESSION: 0

## 2025-01-24 ASSESSMENT — PAIN SCALES - GENERAL: PAINLEVEL_OUTOF10: 4

## 2025-01-24 ASSESSMENT — PAIN - FUNCTIONAL ASSESSMENT: PAIN_FUNCTIONAL_ASSESSMENT: 0-10

## 2025-01-24 NOTE — H&P
History Of Present Illness  Kellen Shook is a 59 y.o. female presenting with chronic thoracic pain here today for radiofrequency ablation.     Past Medical History  Past Medical History:   Diagnosis Date    Acute cystitis with hematuria 01/02/2020    Acute cystitis with hematuria    Allergic rhinitis     Anesthesia of skin 10/28/2019    Numbness of fingers    Arthritis     Body mass index (BMI) 31.0-31.9, adult 01/12/2021    BMI 31.0-31.9,adult    Body mass index (BMI) 33.0-33.9, adult 02/24/2021    BMI 33.0-33.9,adult    Body mass index (BMI) 35.0-35.9, adult 03/16/2019    BMI 35.0-35.9,adult    Body mass index (BMI) 35.0-35.9, adult 08/24/2021    BMI 35.0-35.9,adult    Body mass index (BMI) 36.0-36.9, adult 02/24/2022    BMI 36.0-36.9,adult    Carpal tunnel syndrome, right upper limb 05/21/2020    Carpal tunnel syndrome of right wrist    Chronic pain disorder     Arthritis    Cognitive disorder 2020    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    CPAP (continuous positive airway pressure) dependence     Cutaneous abscess of face 12/26/2014    Facial abscess    Diverticulosis 2018    Seen with first colonoscopy    Dizziness 2023    Occasionally    Encounter for immunization 06/15/2020    Influenza vaccine administered    Encounter for screening for malignant neoplasm of cervix 07/21/2020    Encounter for screening for malignant neoplasm of cervix    Encounter for screening for malignant neoplasm of colon 06/22/2019    Colon cancer screening    Encounter for screening for malignant neoplasm of rectum 06/22/2019    Screening for rectal cancer    Fibroid     Fibromyalgia, primary     H/O chronic eczema     History of decompression of median nerve 02/21/2024    Hyperlipidemia     Hypertension     Joint pain     Hip and back    Localized swelling, mass and lump, right upper limb 05/21/2020    Mass of finger of right hand    Low back pain, unspecified 11/18/2019    Acute low back pain    Lumbar disc disease      Degenerative  disc disease    Medial epicondylitis, unspecified elbow 10/20/2020    Epicondylitis elbow, medial    Neuromuscular disorder (Multi)     Parkinsons    Obesity     Osteoarthritis     Other abnormal and inconclusive findings on diagnostic imaging of breast 02/18/2016    Abnormal mammogram of left breast    Other abnormal findings in urine     Leukocytes in urine    Other acute sinusitis 03/16/2019    Other acute sinusitis, recurrence not specified    Other amnesia 12/16/2020    Memory disorder    Other conditions influencing health status 04/18/2013    Nephrolithiasis    Other conditions influencing health status 01/28/2016    History of dyspareunia    Other microscopic hematuria 03/17/2018    Hematuria, microscopic    Other specified disorders of breast 02/24/2021    Breast asymmetry    Other specified postprocedural states 02/27/2020    History of carpal tunnel surgery    Pain in unspecified elbow 10/20/2020    Elbow pain    Parkinson disease (Multi)     Parkinson disease (Multi)     Peripheral neuropathy     Personal history of diseases of the skin and subcutaneous tissue 11/15/2017    History of dermatitis    Personal history of other diseases of the female genital tract 06/22/2019    History of postmenopausal bleeding    Personal history of other diseases of the musculoskeletal system and connective tissue 09/11/2019    History of spinal stenosis    Personal history of other diseases of the musculoskeletal system and connective tissue 01/27/2016    History of joint pain    Personal history of other diseases of the nervous system and sense organs 11/20/2020    History of Parkinson's disease    Personal history of other endocrine, nutritional and metabolic disease 11/20/2020    History of familial combined hyperlipidemia    Personal history of other specified conditions 08/23/2021    History of breast lump    Personal history of other specified conditions 01/02/2020    History of urinary frequency     Personal history of other specified conditions 09/14/2020    History of chest pain    Polyp of colon     Hyperplastic colon polyp    Restless leg     Rosacea     Skin disorder     Eczema    Sleep apnea, obstructive     Snoring     Trigger finger, right ring finger 05/21/2020    Trigger ring finger of right hand    Trochanteric bursitis, left hip 04/25/2016    Trochanteric bursitis of left hip    Vertigo 2023    Happens occasionally     Surgical History  Past Surgical History:   Procedure Laterality Date    CARPAL TUNNEL RELEASE  02/17/2020    COLONOSCOPY  03/17/2018    Complete Colonoscopy    EPIDURAL BLOCK INJECTION      EXPLORATORY LAPAROTOMY  11/1/2002    For adhedsions from  tubal ligation    OTHER SURGICAL HISTORY  07/18/2013    Lysis Of Peritoneal Adhesions Laparoscopic    OTHER SURGICAL HISTORY  07/21/2020    Endoscopy    TRIGGER FINGER RELEASE  2/17/2020    TUBAL LIGATION  03/01/2021    Tubal Ligation     Social History  She reports that she has never smoked. She has never used smokeless tobacco. She reports current alcohol use. She reports that she does not use drugs.    Family History  Family History   Problem Relation Name Age of Onset    Diabetes type II Mother Angelika Ragland     Hypertension Mother Angelika Ragland     Cancer Mother Angelika Ragland     Hearing loss Mother Angelika Ragland     Hyperlipidemia Mother Angelika Ragland     Heart attack Mother Angelika Ragland     Arthritis Mother Angelika Ragland     Alcohol abuse Father Marty Ragland     Cancer Father Marty Ragland     Arthritis Sister Marietta Preciado     Hypertension Sister Madisyn Bob     No Known Problems Sister      Hypertension Brother Elia Obie     Heart attack Brother Elia Ragland     No Known Problems Brother      No Known Problems Brother      No Known Problems Brother      No Known Problems Son      No Known Problems Son      No Known Problems Son      Breast cancer Cousin  65        Allergies  Allergies   Allergen Reactions    Statins-Hmg-Coa Reductase  Inhibitors Hives    Amoxicillin Rash and Unknown     Review of Systems   Endocrine: Positive for cold intolerance.   Musculoskeletal:  Positive for arthralgias and back pain.   All other systems reviewed and are negative.    Pre-sedation Evaluation:  ASA Classification - ASA 2 - Patient with mild systemic disease with no functional limitations  Mallampati Score - II (hard and soft palate, upper portion of tonsils and uvula visible)    Physical Exam  Vitals and nursing note reviewed.   Constitutional:       Appearance: Normal appearance.   HENT:      Head: Normocephalic and atraumatic.      Nose: Nose normal.   Cardiovascular:      Rate and Rhythm: Normal rate.      Pulses: Normal pulses.   Neurological:      Mental Status: She is alert.          Last Recorded Vitals  There were no vitals taken for this visit.     Assessment/Plan   Patient here today for bilateral T6/7 T7/8 radiofrequency ablation of medial branch nerves.  Risks, benefit, and alternatives of the procedure were discussed with the patient.  Oswestry score has been compelted and recorded.     PTA/Current Medications:  (Not in a hospital admission)    Current Outpatient Medications   Medication Sig Dispense Refill    betamethasone valerate (Valisone) 0.1 % cream Apply 1 Application topically 2 times a day as needed for rash (ECZEMA BOTH EARS).      carbidopa-levodopa (Sinemet)  mg tablet Take 1.5 tablets by mouth 4 times a day. Take 1.5 tablet at approximately 9, 1, 5, 9. Do not take less than 4 hours apart. 540 tablet 3    celecoxib (CeleBREX) 200 mg capsule TAKE 1 CAPSULE BY MOUTH TWICE A DAY 60 capsule 3    ezetimibe (Zetia) 10 mg tablet TAKE 1 TABLET (10 MG) BY MOUTH ONCE DAILY. MORNING 90 tablet 1    fexofenadine (Allegra) 180 mg tablet Take 1 tablet (180 mg) by mouth once daily.      fluticasone (Flonase) 50 mcg/actuation nasal spray Administer 1 spray into affected nostril(s) once daily.      gabapentin (Neurontin) 300 mg capsule Take 2  capsules (600 mg) by mouth once daily at bedtime. 180 capsule 3    hydroCHLOROthiazide (HYDRODiuril) 25 mg tablet TAKE 1 TABLET BY MOUTH ONCE DAILY 90 tablet 3    ketoconazole (NIZOral) 2 % shampoo Apply topically 3 times a week. shampoo      lidocaine (Lidoderm) 5 % patch Place 1 patch on the skin once daily. Remove & discard patch within 12 hours or as directed by MD. 4% patch      lisinopril 10 mg tablet TAKE 1 TABLET BY MOUTH EVERY DAY 90 tablet 3    MULTIVITAMIN ORAL Take 1 tablet by mouth once daily.      oxybutynin XL (Ditropan-XL) 10 mg 24 hr tablet TAKE 1 TABLET BY MOUTH EVERY DAY 90 tablet 1    estradiol (Yuvafem) 10 mcg tablet vaginal tablet Insert 1 tablet (10 mcg) into the vagina once daily.      estradiol (Yuvafem) 10 mcg tablet vaginal tablet Insert 1 tablet (10 mcg) into the vagina 2 times a week. Insert one tablet into vagina at bedtime twice a week. 24 tablet 0    metroNIDAZOLE (Metrolotion) 0.75 % lotion lotion Apply 1 Application topically 2 times a day as needed (ROSECEA).      neomycin-bacitracnZn-polymyxnB (Neosporin Original) ointment Apply 1 Application topically once daily. At  bed time      neomycin-polymyxin B-dexameth (Polydex) 3.5 mg/g-10,000 unit/g-0.1 % ointment ophthalmic ointment Apply to both eyelids at bedtime 3.5 g 1    triamcinolone (Kenalog) 0.025 % cream Apply small amount of cream to affected area as needed 15 g 3    triamcinolone (Kenalog) 0.5 % cream 1 Application 2 times a day as needed for rash.       No current facility-administered medications for this encounter.     Matty Perez MD

## 2025-01-24 NOTE — NURSING NOTE
Pt arrived in stable condition, denies any neuro deficits, bandaid dry and intact. Discharge instructions given.

## 2025-01-27 ENCOUNTER — APPOINTMENT (OUTPATIENT)
Dept: OPHTHALMOLOGY | Facility: CLINIC | Age: 60
End: 2025-01-27
Payer: COMMERCIAL

## 2025-01-27 DIAGNOSIS — H16.213 EXPOSURE KERATOCONJUNCTIVITIS OF BOTH EYES: ICD-10-CM

## 2025-01-27 DIAGNOSIS — H52.4 HYPEROPIA WITH PRESBYOPIA OF BOTH EYES: Primary | ICD-10-CM

## 2025-01-27 DIAGNOSIS — H02.889 MEIBOMIAN GLAND DYSFUNCTION: ICD-10-CM

## 2025-01-27 DIAGNOSIS — H04.123 DRY EYE SYNDROME OF LACRIMAL GLAND, BILATERAL: ICD-10-CM

## 2025-01-27 DIAGNOSIS — H18.523 CORNEAL EPITHELIAL BASEMENT MEMBRANE DYSTROPHY OF BOTH EYES: ICD-10-CM

## 2025-01-27 DIAGNOSIS — H52.03 HYPEROPIA WITH PRESBYOPIA OF BOTH EYES: Primary | ICD-10-CM

## 2025-01-27 PROBLEM — H00.025 HORDEOLUM INTERNUM OF LEFT LOWER EYELID: Status: RESOLVED | Noted: 2024-07-25 | Resolved: 2025-01-27

## 2025-01-27 PROBLEM — H10.13 ALLERGIC CONJUNCTIVITIS OF BOTH EYES: Status: RESOLVED | Noted: 2024-07-25 | Resolved: 2025-01-27

## 2025-01-27 PROCEDURE — 92014 COMPRE OPH EXAM EST PT 1/>: CPT | Performed by: STUDENT IN AN ORGANIZED HEALTH CARE EDUCATION/TRAINING PROGRAM

## 2025-01-27 PROCEDURE — 92015 DETERMINE REFRACTIVE STATE: CPT | Performed by: STUDENT IN AN ORGANIZED HEALTH CARE EDUCATION/TRAINING PROGRAM

## 2025-01-27 ASSESSMENT — REFRACTION_WEARINGRX
OD_CYLINDER: +1.50
OS_SPHERE: +2.50
OS_ADD: +2.50
OS_AXIS: 158
OD_AXIS: 160
OD_ADD: +2.50
OS_CYLINDER: +1.25
OD_SPHERE: -0.25

## 2025-01-27 ASSESSMENT — CONF VISUAL FIELD
METHOD: COUNTING FINGERS
OS_NORMAL: 1
OS_SUPERIOR_TEMPORAL_RESTRICTION: 0
OS_INFERIOR_NASAL_RESTRICTION: 0
OD_INFERIOR_TEMPORAL_RESTRICTION: 0
OS_INFERIOR_TEMPORAL_RESTRICTION: 0
OS_SUPERIOR_NASAL_RESTRICTION: 0
OD_INFERIOR_NASAL_RESTRICTION: 0
OD_SUPERIOR_NASAL_RESTRICTION: 0
OD_SUPERIOR_TEMPORAL_RESTRICTION: 0
OD_NORMAL: 1

## 2025-01-27 ASSESSMENT — ENCOUNTER SYMPTOMS
RESPIRATORY NEGATIVE: 0
GASTROINTESTINAL NEGATIVE: 0
CARDIOVASCULAR NEGATIVE: 0
ALLERGIC/IMMUNOLOGIC NEGATIVE: 0
PSYCHIATRIC NEGATIVE: 0
NEUROLOGICAL NEGATIVE: 0
ENDOCRINE NEGATIVE: 0
HEMATOLOGIC/LYMPHATIC NEGATIVE: 0
MUSCULOSKELETAL NEGATIVE: 0
EYES NEGATIVE: 1
CONSTITUTIONAL NEGATIVE: 0

## 2025-01-27 ASSESSMENT — REFRACTION_MANIFEST
OS_SPHERE: +2.75
OD_AXIS: 160
OS_CYLINDER: SPHERE
OS_ADD: +2.50
OD_CYLINDER: +1.25
OD_ADD: +2.50
OD_SPHERE: PLANO

## 2025-01-27 ASSESSMENT — VISUAL ACUITY
OS_CC+: +2
OS_CC: 20/30
OD_CC: 20/25
OD_CC+: -2
CORRECTION_TYPE: GLASSES
METHOD: SNELLEN - LINEAR

## 2025-01-27 ASSESSMENT — EXTERNAL EXAM - RIGHT EYE: OD_EXAM: NORMAL

## 2025-01-27 ASSESSMENT — TONOMETRY
IOP_METHOD: GOLDMANN APPLANATION
OS_IOP_MMHG: 10
OD_IOP_MMHG: 10

## 2025-01-27 ASSESSMENT — EXTERNAL EXAM - LEFT EYE: OS_EXAM: NORMAL

## 2025-01-27 ASSESSMENT — CUP TO DISC RATIO
OD_RATIO: .30
OS_RATIO: .30

## 2025-01-27 NOTE — PROGRESS NOTES
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 Monitor 1 year or sooner prn. Refraction billed today.    RTC 1 year for annual with ROYCE and ANJALI

## 2025-02-10 DIAGNOSIS — M47.817 SPONDYLOSIS WITHOUT MYELOPATHY OR RADICULOPATHY, LUMBOSACRAL REGION: ICD-10-CM

## 2025-02-10 DIAGNOSIS — M54.14 THORACIC RADICULOPATHY: ICD-10-CM

## 2025-02-10 DIAGNOSIS — M47.24 SPONDYLOSIS OF THORACIC SPINE WITH RADICULOPATHY: ICD-10-CM

## 2025-02-10 RX ORDER — CELECOXIB 200 MG/1
200 CAPSULE ORAL 2 TIMES DAILY
Qty: 60 CAPSULE | Refills: 3 | Status: SHIPPED | OUTPATIENT
Start: 2025-02-10

## 2025-02-11 ENCOUNTER — OFFICE VISIT (OUTPATIENT)
Dept: PAIN MEDICINE | Facility: CLINIC | Age: 60
End: 2025-02-11
Payer: COMMERCIAL

## 2025-02-11 VITALS
SYSTOLIC BLOOD PRESSURE: 108 MMHG | OXYGEN SATURATION: 97 % | BODY MASS INDEX: 22.71 KG/M2 | DIASTOLIC BLOOD PRESSURE: 68 MMHG | HEIGHT: 64 IN | WEIGHT: 133 LBS | HEART RATE: 67 BPM | RESPIRATION RATE: 16 BRPM

## 2025-02-11 DIAGNOSIS — M79.10 MUSCLE PAIN: ICD-10-CM

## 2025-02-11 DIAGNOSIS — M47.814 THORACIC SPONDYLOSIS WITHOUT MYELOPATHY: Primary | ICD-10-CM

## 2025-02-11 PROCEDURE — 3074F SYST BP LT 130 MM HG: CPT | Performed by: ANESTHESIOLOGY

## 2025-02-11 PROCEDURE — 99214 OFFICE O/P EST MOD 30 MIN: CPT | Performed by: ANESTHESIOLOGY

## 2025-02-11 PROCEDURE — 2500000004 HC RX 250 GENERAL PHARMACY W/ HCPCS (ALT 636 FOR OP/ED): Performed by: ANESTHESIOLOGY

## 2025-02-11 PROCEDURE — 99214 OFFICE O/P EST MOD 30 MIN: CPT | Mod: 25 | Performed by: ANESTHESIOLOGY

## 2025-02-11 PROCEDURE — 1036F TOBACCO NON-USER: CPT | Performed by: ANESTHESIOLOGY

## 2025-02-11 PROCEDURE — 96372 THER/PROPH/DIAG INJ SC/IM: CPT | Performed by: ANESTHESIOLOGY

## 2025-02-11 PROCEDURE — 3078F DIAST BP <80 MM HG: CPT | Performed by: ANESTHESIOLOGY

## 2025-02-11 PROCEDURE — 3008F BODY MASS INDEX DOCD: CPT | Performed by: ANESTHESIOLOGY

## 2025-02-11 RX ORDER — CYCLOBENZAPRINE HCL 5 MG
5 TABLET ORAL 2 TIMES DAILY PRN
Qty: 20 TABLET | Refills: 0 | Status: SHIPPED | OUTPATIENT
Start: 2025-02-11 | End: 2025-02-21

## 2025-02-11 RX ORDER — KETOROLAC TROMETHAMINE 30 MG/ML
30 INJECTION, SOLUTION INTRAMUSCULAR; INTRAVENOUS ONCE
Status: COMPLETED | OUTPATIENT
Start: 2025-02-11 | End: 2025-02-11

## 2025-02-11 RX ADMIN — KETOROLAC TROMETHAMINE 30 MG: 30 INJECTION, SOLUTION INTRAMUSCULAR; INTRAVENOUS at 14:19

## 2025-02-11 ASSESSMENT — ENCOUNTER SYMPTOMS
BACK PAIN: 1
ALLERGIC/IMMUNOLOGIC NEGATIVE: 1
NEUROLOGICAL NEGATIVE: 1
RESPIRATORY NEGATIVE: 1
GASTROINTESTINAL NEGATIVE: 1
HEMATOLOGIC/LYMPHATIC NEGATIVE: 1
CARDIOVASCULAR NEGATIVE: 1
EYES NEGATIVE: 1
PSYCHIATRIC NEGATIVE: 1
ENDOCRINE NEGATIVE: 1
CONSTITUTIONAL NEGATIVE: 1

## 2025-02-11 ASSESSMENT — PAIN DESCRIPTION - DESCRIPTORS: DESCRIPTORS: ACHING;PRESSURE

## 2025-02-11 ASSESSMENT — PAIN - FUNCTIONAL ASSESSMENT: PAIN_FUNCTIONAL_ASSESSMENT: 0-10

## 2025-02-11 ASSESSMENT — PAIN SCALES - GENERAL
PAINLEVEL_OUTOF10: 1

## 2025-02-11 NOTE — PROGRESS NOTES
Subjective   Patient ID: Kellen Shook is a 59 y.o. female who presents for Back Pain.  Back Pain    Patient here today for follow up today.  She underwent a T7 and T8 RFA which has been excellent for her.  She is reporting almost 100% relief in the mid thoracic area.    She is having a new acute pain in the right lower thoracic/lumbar area.  She has a tight spasm in this area.  She has require to lift heavy boxes and her work which has irritated her abck.  She reports that she has got a sharp pain on the right side of her back that is new.  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  She is reporting no radiation into her lower extremities.  She reports no numbness, tingling, weakness.  Is been taking over-the-counter medications to help.  She would like to know what options she has.    Review of Systems   Constitutional: Negative.    HENT: Negative.     Eyes: Negative.    Respiratory: Negative.     Cardiovascular: Negative.    Gastrointestinal: Negative.    Endocrine: Negative.    Genitourinary: Negative.    Musculoskeletal:  Positive for back pain.   Skin: Negative.    Allergic/Immunologic: Negative.    Neurological: Negative.    Hematological: Negative.    Psychiatric/Behavioral: Negative.         Objective   Physical Exam  Vitals and nursing note reviewed.   Constitutional:       Appearance: Normal appearance.   HENT:      Head: Normocephalic and atraumatic.      Right Ear: Ear canal and external ear normal.      Left Ear: Ear canal and external ear normal.      Nose: Nose normal.      Mouth/Throat:      Mouth: Mucous membranes are moist.      Pharynx: Oropharynx is clear.   Eyes:      Conjunctiva/sclera: Conjunctivae normal.      Pupils: Pupils are equal, round, and reactive to light.   Cardiovascular:      Rate and Rhythm: Normal rate.   Pulmonary:      Effort: Pulmonary effort is normal. No respiratory distress.   Musculoskeletal:       Cervical back: Normal range of motion and neck supple. No tenderness.      Thoracic back: Tenderness present. Decreased range of motion.        Back:    Skin:     General: Skin is warm and dry.   Neurological:      Mental Status: She is alert and oriented to person, place, and time.      Sensory: Sensation is intact.      Motor: Motor function is intact.      Coordination: Coordination is intact.      Gait: Gait is intact.      Deep Tendon Reflexes:      Reflex Scores:       Bicep reflexes are 2+ on the right side and 2+ on the left side.       Brachioradialis reflexes are 2+ on the right side and 2+ on the left side.     Comments: [place] -   Psychiatric:         Mood and Affect: Mood normal.         Thought Content: Thought content normal.         Assessment/Plan   Problem List Items Addressed This Visit    None  Visit Diagnoses         Codes    Thoracic spondylosis without myelopathy    -  Primary M47.814    Muscle pain     M79.10             I nice discussion with the patient today our plan will be as follows.    Radiology: All available imaging was reviewed.    Physically: Patient encouraged to use heat and stretching on her thoracic spasm.    Psychologically: No issues at this time.    Medication: Patient to receive Toradol 30 mg IM in the clinic for acute back spasm.  Cyclobenzaprine 5 mg 1 tablet twice daily for 10 days to be started to help with spasm.    Duration: Acute pain 1 week.  Chronic pain over 1 year.    Intervention: Patient did excellent with thoracic radiofrequency ablation targeting the T6-7 and T7-8 levels.  She is reporting 100% relief in that area.  Patient now with acute back pain we will treat conservatively castaneda show for trigger points in the future.        Please note that this report has been produced using speech recognition software. It may contain errors related to grammar, punctuation or spelling. Electronically signed, but not reviewed.       Matty Perez MD 02/11/25 2:07  PM

## 2025-02-22 ENCOUNTER — PATIENT MESSAGE (OUTPATIENT)
Dept: SLEEP MEDICINE | Facility: CLINIC | Age: 60
End: 2025-02-22
Payer: COMMERCIAL

## 2025-02-22 DIAGNOSIS — G25.81 RLS (RESTLESS LEGS SYNDROME): ICD-10-CM

## 2025-02-22 DIAGNOSIS — E78.5 HYPERLIPIDEMIA, UNSPECIFIED HYPERLIPIDEMIA TYPE: ICD-10-CM

## 2025-02-24 RX ORDER — EZETIMIBE 10 MG/1
10 TABLET ORAL DAILY
Qty: 90 TABLET | Refills: 3 | Status: SHIPPED | OUTPATIENT
Start: 2025-02-24

## 2025-02-24 RX ORDER — GABAPENTIN 300 MG/1
600 CAPSULE ORAL NIGHTLY
Qty: 180 CAPSULE | Refills: 3 | Status: SHIPPED | OUTPATIENT
Start: 2025-02-24 | End: 2026-02-24

## 2025-03-26 ENCOUNTER — APPOINTMENT (OUTPATIENT)
Dept: NEUROLOGY | Facility: CLINIC | Age: 60
End: 2025-03-26
Payer: COMMERCIAL

## 2025-05-17 DIAGNOSIS — N32.81 OVERACTIVE BLADDER: ICD-10-CM

## 2025-05-18 RX ORDER — OXYBUTYNIN CHLORIDE 10 MG/1
10 TABLET, EXTENDED RELEASE ORAL DAILY
Qty: 90 TABLET | Refills: 1 | Status: SHIPPED | OUTPATIENT
Start: 2025-05-18

## 2025-05-28 DIAGNOSIS — M54.14 THORACIC RADICULOPATHY: ICD-10-CM

## 2025-05-28 DIAGNOSIS — M47.817 SPONDYLOSIS WITHOUT MYELOPATHY OR RADICULOPATHY, LUMBOSACRAL REGION: ICD-10-CM

## 2025-05-28 DIAGNOSIS — M47.24 SPONDYLOSIS OF THORACIC SPINE WITH RADICULOPATHY: ICD-10-CM

## 2025-05-28 RX ORDER — CELECOXIB 200 MG/1
200 CAPSULE ORAL 2 TIMES DAILY
Qty: 60 CAPSULE | Refills: 3 | Status: SHIPPED | OUTPATIENT
Start: 2025-05-28

## 2025-06-26 ENCOUNTER — OFFICE VISIT (OUTPATIENT)
Dept: PRIMARY CARE | Facility: CLINIC | Age: 60
End: 2025-06-26
Payer: MEDICARE

## 2025-06-26 VITALS
SYSTOLIC BLOOD PRESSURE: 130 MMHG | WEIGHT: 146 LBS | OXYGEN SATURATION: 98 % | HEIGHT: 64 IN | HEART RATE: 69 BPM | DIASTOLIC BLOOD PRESSURE: 72 MMHG | BODY MASS INDEX: 24.92 KG/M2

## 2025-06-26 DIAGNOSIS — Z23 ENCOUNTER FOR IMMUNIZATION: ICD-10-CM

## 2025-06-26 DIAGNOSIS — N32.81 OVERACTIVE BLADDER: ICD-10-CM

## 2025-06-26 DIAGNOSIS — M54.14 THORACIC RADICULOPATHY: ICD-10-CM

## 2025-06-26 DIAGNOSIS — E78.5 HYPERLIPIDEMIA, UNSPECIFIED HYPERLIPIDEMIA TYPE: ICD-10-CM

## 2025-06-26 DIAGNOSIS — Z12.11 ENCOUNTER FOR SCREENING COLONOSCOPY: ICD-10-CM

## 2025-06-26 DIAGNOSIS — Z13.1 SCREENING FOR DIABETES MELLITUS: ICD-10-CM

## 2025-06-26 DIAGNOSIS — I10 HYPERTENSION, UNSPECIFIED TYPE: ICD-10-CM

## 2025-06-26 DIAGNOSIS — M47.24 SPONDYLOSIS OF THORACIC SPINE WITH RADICULOPATHY: ICD-10-CM

## 2025-06-26 DIAGNOSIS — M47.817 SPONDYLOSIS WITHOUT MYELOPATHY OR RADICULOPATHY, LUMBOSACRAL REGION: ICD-10-CM

## 2025-06-26 DIAGNOSIS — Z00.00 ANNUAL PHYSICAL EXAM: Primary | ICD-10-CM

## 2025-06-26 PROCEDURE — 90750 HZV VACC RECOMBINANT IM: CPT

## 2025-06-26 PROCEDURE — 90471 IMMUNIZATION ADMIN: CPT

## 2025-06-26 PROCEDURE — 3078F DIAST BP <80 MM HG: CPT

## 2025-06-26 PROCEDURE — 3008F BODY MASS INDEX DOCD: CPT

## 2025-06-26 PROCEDURE — 99396 PREV VISIT EST AGE 40-64: CPT

## 2025-06-26 PROCEDURE — 1036F TOBACCO NON-USER: CPT

## 2025-06-26 PROCEDURE — 3075F SYST BP GE 130 - 139MM HG: CPT

## 2025-06-26 RX ORDER — CELECOXIB 200 MG/1
200 CAPSULE ORAL 2 TIMES DAILY
Qty: 90 CAPSULE | Refills: 1 | Status: SHIPPED | OUTPATIENT
Start: 2025-06-26

## 2025-06-26 RX ORDER — OXYBUTYNIN CHLORIDE 10 MG/1
10 TABLET, EXTENDED RELEASE ORAL DAILY
Qty: 90 TABLET | Refills: 1 | Status: SHIPPED | OUTPATIENT
Start: 2025-06-26

## 2025-06-26 ASSESSMENT — ENCOUNTER SYMPTOMS
VOMITING: 0
WHEEZING: 0
PALPITATIONS: 0
DIARRHEA: 0
SORE THROAT: 0
BLOOD IN STOOL: 0
DIAPHORESIS: 0
COUGH: 0
ARTHRALGIAS: 1
DIZZINESS: 1
SHORTNESS OF BREATH: 0
FATIGUE: 0
NAUSEA: 0
ADENOPATHY: 0
FEVER: 0
LIGHT-HEADEDNESS: 1

## 2025-06-26 ASSESSMENT — PAIN SCALES - GENERAL: PAINLEVEL_OUTOF10: 0-NO PAIN

## 2025-06-26 NOTE — PROGRESS NOTES
"Subjective   Patient ID: Kellen Shook is a 59 y.o. female who presents for Annual Exam and Med Refill.    Patient presenting for CPE    Needs refills on Oxybutynin and Celebrex.     OA: worse in hands, takes celebrex daily needs refills. Use to Dr. Perez (PM).     Overactive bladder: controlled on oxybutynin. Needs refills.     HM: PAP UTD. Mammogram, due but has upcoming appt with ob/gyn. Colon screening, 2021, repeat 3 years, overdue, referral placed. Lung screening, never smoker. DEXA start 63. Vaccinations will get first shingles today.            Review of Systems   Constitutional:  Negative for diaphoresis, fatigue and fever.   HENT:  Negative for congestion, hearing loss and sore throat.    Respiratory:  Negative for cough, shortness of breath and wheezing.    Cardiovascular:  Negative for chest pain, palpitations and leg swelling.   Gastrointestinal:  Negative for blood in stool, diarrhea, nausea and vomiting.   Musculoskeletal:  Positive for arthralgias.   Skin:  Negative for rash.   Allergic/Immunologic: Negative for immunocompromised state.   Neurological:  Positive for dizziness and light-headedness. Negative for syncope.   Hematological:  Negative for adenopathy.   Psychiatric/Behavioral:  Negative for suicidal ideas.        Objective   /72   Pulse 69   Ht 1.626 m (5' 4\")   Wt 66.2 kg (146 lb)   SpO2 98%   BMI 25.06 kg/m²     Physical Exam  Constitutional:       General: She is not in acute distress.     Appearance: Normal appearance.   HENT:      Right Ear: Tympanic membrane and ear canal normal.      Left Ear: Tympanic membrane and ear canal normal.      Nose: Nose normal.      Mouth/Throat:      Mouth: Mucous membranes are moist.      Pharynx: Oropharynx is clear. No oropharyngeal exudate or posterior oropharyngeal erythema.   Eyes:      Extraocular Movements: Extraocular movements intact.      Conjunctiva/sclera: Conjunctivae normal.      Pupils: Pupils are equal, round, and reactive to " light.   Neck:      Thyroid: No thyroid mass or thyromegaly.   Cardiovascular:      Rate and Rhythm: Normal rate and regular rhythm.      Pulses: Normal pulses.      Heart sounds: No murmur heard.     No gallop.   Pulmonary:      Effort: Pulmonary effort is normal.      Breath sounds: No wheezing, rhonchi or rales.   Abdominal:      General: Bowel sounds are normal.      Palpations: Abdomen is soft. There is no hepatomegaly, splenomegaly or mass.      Tenderness: There is no abdominal tenderness. There is no guarding.   Musculoskeletal:         General: Normal range of motion.      Right lower leg: No edema.      Left lower leg: No edema.   Lymphadenopathy:      Cervical: No cervical adenopathy.   Skin:     General: Skin is warm.      Findings: No rash.   Neurological:      General: No focal deficit present.      Mental Status: She is alert.      Motor: Motor function is intact. No weakness.   Psychiatric:         Mood and Affect: Mood normal.         Thought Content: Thought content normal.         Assessment/Plan   Diagnoses and all orders for this visit:  Annual physical exam  Spondylosis without myelopathy or radiculopathy, lumbosacral region  -     celecoxib (CeleBREX) 200 mg capsule; Take 1 capsule (200 mg) by mouth 2 times a day.  Thoracic radiculopathy  -     celecoxib (CeleBREX) 200 mg capsule; Take 1 capsule (200 mg) by mouth 2 times a day.  Spondylosis of thoracic spine with radiculopathy  -     celecoxib (CeleBREX) 200 mg capsule; Take 1 capsule (200 mg) by mouth 2 times a day.  Hypertension, unspecified type  Overactive bladder  -     oxyBUTYnin XL (Ditropan-XL) 10 mg 24 hr tablet; Take 1 tablet (10 mg) by mouth once daily.  Encounter for immunization  Screening for diabetes mellitus  -     Comprehensive Metabolic Panel; Future  Hyperlipidemia, unspecified hyperlipidemia type  -     Lipid Panel; Future  Encounter for screening colonoscopy  -     Referral to Gastroenterology; Future  Other orders  -      Zoster vaccine, recombinant, adult (SHINGRIX)  Follow-up 6 months with PCP for med refills and second shingles.

## 2025-06-29 LAB
ALBUMIN SERPL-MCNC: 4.8 G/DL (ref 3.6–5.1)
ALP SERPL-CCNC: 67 U/L (ref 37–153)
ALT SERPL-CCNC: 5 U/L (ref 6–29)
ANION GAP SERPL CALCULATED.4IONS-SCNC: 7 MMOL/L (CALC) (ref 7–17)
AST SERPL-CCNC: 15 U/L (ref 10–35)
BILIRUB SERPL-MCNC: 0.8 MG/DL (ref 0.2–1.2)
BUN SERPL-MCNC: 18 MG/DL (ref 7–25)
CALCIUM SERPL-MCNC: 9.4 MG/DL (ref 8.6–10.4)
CHLORIDE SERPL-SCNC: 102 MMOL/L (ref 98–110)
CHOLEST SERPL-MCNC: 231 MG/DL
CHOLEST/HDLC SERPL: 3 (CALC)
CO2 SERPL-SCNC: 30 MMOL/L (ref 20–32)
CREAT SERPL-MCNC: 0.59 MG/DL (ref 0.5–1.05)
EGFRCR SERPLBLD CKD-EPI 2021: 102 ML/MIN/1.73M2
GLUCOSE SERPL-MCNC: 91 MG/DL (ref 65–99)
HDLC SERPL-MCNC: 77 MG/DL
LDLC SERPL CALC-MCNC: 137 MG/DL (CALC)
NONHDLC SERPL-MCNC: 154 MG/DL (CALC)
POTASSIUM SERPL-SCNC: 4.4 MMOL/L (ref 3.5–5.3)
PROT SERPL-MCNC: 7 G/DL (ref 6.1–8.1)
SODIUM SERPL-SCNC: 139 MMOL/L (ref 135–146)
TRIGL SERPL-MCNC: 75 MG/DL

## 2025-06-30 ENCOUNTER — TELEPHONE (OUTPATIENT)
Dept: PRIMARY CARE | Facility: CLINIC | Age: 60
End: 2025-06-30
Payer: MEDICARE

## 2025-06-30 NOTE — TELEPHONE ENCOUNTER
Cholesterol and triglycerides slightly elevated but overall ratio of good to bad cholesterol is good. No medication indicated. Rest of labs look good.

## 2025-07-14 ENCOUNTER — APPOINTMENT (OUTPATIENT)
Dept: OBSTETRICS AND GYNECOLOGY | Facility: CLINIC | Age: 60
End: 2025-07-14
Payer: MEDICARE

## 2025-07-14 VITALS — BODY MASS INDEX: 25.4 KG/M2 | WEIGHT: 148 LBS | DIASTOLIC BLOOD PRESSURE: 78 MMHG | SYSTOLIC BLOOD PRESSURE: 122 MMHG

## 2025-07-14 DIAGNOSIS — N90.4 LICHEN SCLEROSUS OF VULVA: ICD-10-CM

## 2025-07-14 DIAGNOSIS — N94.10 DYSPAREUNIA, FEMALE: ICD-10-CM

## 2025-07-14 DIAGNOSIS — Z01.419 WELL WOMAN EXAM: Primary | ICD-10-CM

## 2025-07-14 DIAGNOSIS — N95.2 POSTMENOPAUSAL ATROPHIC VAGINITIS: ICD-10-CM

## 2025-07-14 DIAGNOSIS — Z12.31 SCREENING MAMMOGRAM FOR BREAST CANCER: ICD-10-CM

## 2025-07-14 PROCEDURE — 3078F DIAST BP <80 MM HG: CPT | Performed by: MIDWIFE

## 2025-07-14 PROCEDURE — 3074F SYST BP LT 130 MM HG: CPT | Performed by: MIDWIFE

## 2025-07-14 PROCEDURE — 99396 PREV VISIT EST AGE 40-64: CPT | Performed by: MIDWIFE

## 2025-07-14 PROCEDURE — 1036F TOBACCO NON-USER: CPT | Performed by: MIDWIFE

## 2025-07-14 RX ORDER — ESTRADIOL 0.1 MG/G
2 CREAM VAGINAL 3 TIMES WEEKLY
Qty: 42.5 G | Refills: 3 | Status: SHIPPED | OUTPATIENT
Start: 2025-07-14 | End: 2026-07-14

## 2025-07-14 NOTE — PROGRESS NOTES
"Subjective   Patient ID: Kellen Shook is a 60 y.o. female who presents for Annual Exam.  Pt says she stopped estradiol and Kenalog as she and her  were not SA anymore d/t 's health.  Then in the past month they have become sexually active again.  Pt had very painful IC and noticed \"a sore\" on vulva\" after IC.  This has not improved with rest and restarting use of Kenalog.    HPI  PMHx: ; pap  NIL Neg; mammogram  Neg; Lichen SclerosusDx by Dr Carbajal -- managed with Kenalog cream  SocHx:  41 yrs, SA  ROS:  no pelvic pain, no urinary c/o, NAD    Review of Systems    Objective   Physical Exam  Vitals and nursing note reviewed.   Constitutional:       Appearance: Normal appearance.   HENT:      Nose: Nose normal.   Eyes:      Pupils: Pupils are equal, round, and reactive to light.   Neck:      Thyroid: No thyroid mass.   Cardiovascular:      Rate and Rhythm: Normal rate and regular rhythm.   Pulmonary:      Effort: Pulmonary effort is normal.      Breath sounds: Normal breath sounds.   Chest:      Chest wall: No mass.   Breasts:     Right: Normal.      Left: Normal.   Abdominal:      Palpations: Abdomen is soft. There is no mass.      Tenderness: There is no abdominal tenderness.   Genitourinary:     General: Normal vulva.      Labia:         Right: No rash, tenderness or lesion.         Left: No rash, tenderness or lesion.       Vagina: Normal.      Cervix: Normal.      Uterus: Normal.       Adnexa: Right adnexa normal and left adnexa normal.      Comments: Labial fusion at vulvar apex covering clitoris  Vulvar skin is erythematous with white plaques  Musculoskeletal:         General: Normal range of motion.   Lymphadenopathy:      Cervical: No cervical adenopathy.      Lower Body: No right inguinal adenopathy. No left inguinal adenopathy.   Skin:     General: Skin is warm and dry.   Neurological:      Mental Status: She is alert and oriented to person, place, and time.      Motor: " Motor function is intact.      Gait: Gait is intact.   Psychiatric:         Mood and Affect: Mood normal.         Assessment/Plan   Diagnoses and all orders for this visit:  Well woman exam  -     BI mammo bilateral screening tomosynthesis; Future  Screening mammogram for breast cancer  -     BI mammo bilateral screening tomosynthesis; Future  Exam findings and vulvar skin care discussed  Pt referred to vulvar skin specialist-- Luciano Gonzalez's office number given to pt  Pt to restart use of Kenalog and Estradiol cream  Pap next due 2028    RTO AE/PRN         URMILA Ojeda-VICKI, ND 07/14/25 2:03 PM

## 2025-07-21 ENCOUNTER — APPOINTMENT (OUTPATIENT)
Dept: RADIOLOGY | Facility: HOSPITAL | Age: 60
End: 2025-07-21
Payer: MEDICARE

## 2025-07-21 DIAGNOSIS — Z01.419 WELL WOMAN EXAM: ICD-10-CM

## 2025-07-21 DIAGNOSIS — Z12.31 SCREENING MAMMOGRAM FOR BREAST CANCER: ICD-10-CM

## 2025-07-21 PROCEDURE — 77067 SCR MAMMO BI INCL CAD: CPT | Performed by: RADIOLOGY

## 2025-07-21 PROCEDURE — 77063 BREAST TOMOSYNTHESIS BI: CPT | Performed by: RADIOLOGY

## 2025-07-21 PROCEDURE — 77063 BREAST TOMOSYNTHESIS BI: CPT

## 2025-08-29 ENCOUNTER — OFFICE VISIT (OUTPATIENT)
Dept: OBSTETRICS AND GYNECOLOGY | Facility: CLINIC | Age: 60
End: 2025-08-29
Payer: MEDICARE

## 2025-08-29 VITALS
BODY MASS INDEX: 26.98 KG/M2 | HEIGHT: 64 IN | SYSTOLIC BLOOD PRESSURE: 138 MMHG | DIASTOLIC BLOOD PRESSURE: 80 MMHG | WEIGHT: 158 LBS

## 2025-08-29 DIAGNOSIS — L90.0 LICHEN SCLEROSUS: Primary | ICD-10-CM

## 2025-08-29 PROCEDURE — 3075F SYST BP GE 130 - 139MM HG: CPT | Performed by: NURSE PRACTITIONER

## 2025-08-29 PROCEDURE — 3008F BODY MASS INDEX DOCD: CPT | Performed by: NURSE PRACTITIONER

## 2025-08-29 PROCEDURE — 1036F TOBACCO NON-USER: CPT | Performed by: NURSE PRACTITIONER

## 2025-08-29 PROCEDURE — 3079F DIAST BP 80-89 MM HG: CPT | Performed by: NURSE PRACTITIONER

## 2025-08-29 PROCEDURE — 99204 OFFICE O/P NEW MOD 45 MIN: CPT | Performed by: NURSE PRACTITIONER

## 2025-08-29 RX ORDER — CLOBETASOL PROPIONATE 0.5 MG/G
OINTMENT TOPICAL 2 TIMES DAILY
Qty: 45 G | Refills: 0 | Status: SHIPPED | OUTPATIENT
Start: 2025-08-29

## 2025-08-29 ASSESSMENT — ENCOUNTER SYMPTOMS
RESPIRATORY NEGATIVE: 0
CONSTITUTIONAL NEGATIVE: 0
PSYCHIATRIC NEGATIVE: 0
CARDIOVASCULAR NEGATIVE: 0
MUSCULOSKELETAL NEGATIVE: 0
GASTROINTESTINAL NEGATIVE: 0
NEUROLOGICAL NEGATIVE: 0
EYES NEGATIVE: 0
HEMATOLOGIC/LYMPHATIC NEGATIVE: 0
ENDOCRINE NEGATIVE: 0
ALLERGIC/IMMUNOLOGIC NEGATIVE: 0

## 2025-08-29 ASSESSMENT — PAIN SCALES - GENERAL: PAINLEVEL_OUTOF10: 0-NO PAIN

## 2025-09-15 ENCOUNTER — APPOINTMENT (OUTPATIENT)
Dept: OBSTETRICS AND GYNECOLOGY | Facility: CLINIC | Age: 60
End: 2025-09-15
Payer: MEDICARE

## 2025-11-19 ENCOUNTER — APPOINTMENT (OUTPATIENT)
Dept: NEUROLOGY | Facility: CLINIC | Age: 60
End: 2025-11-19
Payer: MEDICARE

## 2025-11-26 ENCOUNTER — APPOINTMENT (OUTPATIENT)
Dept: NEUROLOGY | Facility: CLINIC | Age: 60
End: 2025-11-26
Payer: MEDICARE

## 2025-12-08 ENCOUNTER — APPOINTMENT (OUTPATIENT)
Dept: SLEEP MEDICINE | Facility: CLINIC | Age: 60
End: 2025-12-08
Payer: COMMERCIAL

## (undated) DEVICE — TISSUE ADHESIVE, EXOFIN, 1ML

## (undated) DEVICE — STRIP, SKIN CLOSURE, STERI STRIP, REINFORCED, 0.5 X 4 IN

## (undated) DEVICE — SYRINGE, 10 CC, LUER LOCK

## (undated) DEVICE — SUTURE, VICRYL, RAPIDE, 4-0, 18 IN, P3, UNDYED

## (undated) DEVICE — SOLUTION, IRRIGATION, X RX SODIUM CHL 0.9%, 1000ML BTL

## (undated) DEVICE — DRAPE, SHEET, VI, W/BETADINE

## (undated) DEVICE — INTRACEPT, ACCESS INSTRUMENT

## (undated) DEVICE — SUTURE, CHROMIC, 4-0, 18 IN, PS2, BROWN

## (undated) DEVICE — GLOVE, SURGICAL, PROTEXIS PI BLUE W/NEUTHERA, 8.0, PF, LF

## (undated) DEVICE — PADDING, WEBRIL, UNDERCAST, STERILE, 3 IN

## (undated) DEVICE — COVERS, DISPOSABLE, SMALL, FOR LIGHT PAD

## (undated) DEVICE — SUTURE, ETHILON, 4-0, BLK, MONO, PS-2 18

## (undated) DEVICE — SPONGE, GAUZE, RADIOPAQUE, 12 PLY, 4 X 8 IN, STERILE, LF

## (undated) DEVICE — Device

## (undated) DEVICE — GLOVE, SURGICAL, PROTEXIS PI , 8.0, PF, LF

## (undated) DEVICE — INTRACEPT, RF PROBE

## (undated) DEVICE — DRAPE, SHEET, LAPAROTOMY, W/ISO-BAC, W/ARMBOARD COVERS, 98 X 122 IN, DISPOSABLE, LF, STERILE

## (undated) DEVICE — SOLUTION, IRRIGATION, SODIUM CHLORIDE 0.9%, 1000 ML, POUR BOTTLE

## (undated) DEVICE — STOCKINETTE, TUBE, BLN, ST, 1 PLY, 4 X 48 IN, LF

## (undated) DEVICE — DRAPE PACK, BASIC VI, AURORA, 50 X 90IN

## (undated) DEVICE — GOWN, SURGICAL, SIRUS, NON REINFORCED, LARGE

## (undated) DEVICE — NEEDLE, HYPODERMIC, MONOJECT, 22 G X 1.5 IN, BLUE, RIGID PACK

## (undated) DEVICE — DRESSING, NON-ADHERENT, TELFA, OUCHLESS, 3 X 8 IN, STERILE

## (undated) DEVICE — BANDAGE, ELASTIC, MATRIX, SELF-CLOSURE, 3 IN X 5 YD, LF

## (undated) DEVICE — DRAPE, SHEET, LARGE, 70 X 85IN, STERILE

## (undated) DEVICE — DRAPE, SHEET, U, W/ADHESIVE STRIP, IMPERVIOUS, 60 X 70 IN, DISPOSABLE, LF, STERILE

## (undated) DEVICE — HOLSTER, BOVIE, ES PENCIL, DISP

## (undated) DEVICE — SYRINGE, 20 CC, LUER LOCK

## (undated) DEVICE — DRESSING, GAUZE, FLUFF, 1 PLY, 36 X 36 IN

## (undated) DEVICE — DRAPE, SHEET, UTILITY, NON ABSORBENT, 18 X 26 IN, LF

## (undated) DEVICE — PREP TRAY, SKIN, DRY, W/GLOVES

## (undated) DEVICE — CUFF, TOURNIQUET, 18 X 4, SNGL PORT/SNGL BLADDER, DISP, LF

## (undated) DEVICE — APPLICATOR, CHLORAPREP, W/ORANGE TINT, 26ML

## (undated) DEVICE — DRAPE, SHEET, LAPAROTOMY

## (undated) DEVICE — NEEDLE, SPINAL, 22 G X 3.5 IN, BLACK HUB

## (undated) DEVICE — DRAPE, C-ARM IMAGE